# Patient Record
Sex: MALE | Race: WHITE | NOT HISPANIC OR LATINO | Employment: OTHER | URBAN - METROPOLITAN AREA
[De-identification: names, ages, dates, MRNs, and addresses within clinical notes are randomized per-mention and may not be internally consistent; named-entity substitution may affect disease eponyms.]

---

## 2018-08-06 ENCOUNTER — COMMUNICATION - HEALTHEAST (OUTPATIENT)
Dept: SCHEDULING | Facility: CLINIC | Age: 83
End: 2018-08-06

## 2018-08-14 ENCOUNTER — COMMUNICATION - HEALTHEAST (OUTPATIENT)
Dept: FAMILY MEDICINE | Facility: CLINIC | Age: 83
End: 2018-08-14

## 2018-08-17 ENCOUNTER — OFFICE VISIT - HEALTHEAST (OUTPATIENT)
Dept: FAMILY MEDICINE | Facility: CLINIC | Age: 83
End: 2018-08-17

## 2018-08-17 DIAGNOSIS — J18.9 PNEUMONIA OF LEFT LUNG DUE TO INFECTIOUS ORGANISM, UNSPECIFIED PART OF LUNG: ICD-10-CM

## 2018-08-17 ASSESSMENT — MIFFLIN-ST. JEOR: SCORE: 1424.69

## 2018-08-26 ENCOUNTER — COMMUNICATION - HEALTHEAST (OUTPATIENT)
Dept: RESPIRATORY THERAPY | Facility: HOSPITAL | Age: 83
End: 2018-08-26

## 2018-08-27 ENCOUNTER — COMMUNICATION - HEALTHEAST (OUTPATIENT)
Dept: CARE COORDINATION | Facility: CLINIC | Age: 83
End: 2018-08-27

## 2018-08-28 ENCOUNTER — COMMUNICATION - HEALTHEAST (OUTPATIENT)
Dept: RESPIRATORY THERAPY | Facility: HOSPITAL | Age: 83
End: 2018-08-28

## 2018-08-31 ENCOUNTER — OFFICE VISIT - HEALTHEAST (OUTPATIENT)
Dept: FAMILY MEDICINE | Facility: CLINIC | Age: 83
End: 2018-08-31

## 2018-08-31 ENCOUNTER — COMMUNICATION - HEALTHEAST (OUTPATIENT)
Dept: RESPIRATORY THERAPY | Facility: HOSPITAL | Age: 83
End: 2018-08-31

## 2018-08-31 ENCOUNTER — AMBULATORY - HEALTHEAST (OUTPATIENT)
Dept: PULMONOLOGY | Facility: OTHER | Age: 83
End: 2018-08-31

## 2018-08-31 DIAGNOSIS — R05.9 COUGH: ICD-10-CM

## 2018-08-31 ASSESSMENT — MIFFLIN-ST. JEOR: SCORE: 1419.02

## 2018-09-06 ENCOUNTER — COMMUNICATION - HEALTHEAST (OUTPATIENT)
Dept: RESPIRATORY THERAPY | Facility: CLINIC | Age: 83
End: 2018-09-06

## 2018-09-24 ENCOUNTER — COMMUNICATION - HEALTHEAST (OUTPATIENT)
Dept: SCHEDULING | Facility: CLINIC | Age: 83
End: 2018-09-24

## 2018-09-24 ENCOUNTER — COMMUNICATION - HEALTHEAST (OUTPATIENT)
Dept: FAMILY MEDICINE | Facility: CLINIC | Age: 83
End: 2018-09-24

## 2018-09-25 ENCOUNTER — COMMUNICATION - HEALTHEAST (OUTPATIENT)
Dept: SCHEDULING | Facility: CLINIC | Age: 83
End: 2018-09-25

## 2018-09-25 ENCOUNTER — COMMUNICATION - HEALTHEAST (OUTPATIENT)
Dept: FAMILY MEDICINE | Facility: CLINIC | Age: 83
End: 2018-09-25

## 2018-09-25 ENCOUNTER — COMMUNICATION - HEALTHEAST (OUTPATIENT)
Dept: RESPIRATORY THERAPY | Facility: HOSPITAL | Age: 83
End: 2018-09-25

## 2018-09-25 DIAGNOSIS — J44.9 COPD (CHRONIC OBSTRUCTIVE PULMONARY DISEASE) (H): ICD-10-CM

## 2018-10-17 ASSESSMENT — MIFFLIN-ST. JEOR: SCORE: 1399.29

## 2018-10-19 ASSESSMENT — MIFFLIN-ST. JEOR: SCORE: 1397.48

## 2018-10-20 ASSESSMENT — MIFFLIN-ST. JEOR: SCORE: 1389.31

## 2018-10-21 ENCOUNTER — SURGERY - HEALTHEAST (OUTPATIENT)
Dept: GASTROENTEROLOGY | Facility: HOSPITAL | Age: 83
End: 2018-10-21

## 2018-10-21 ASSESSMENT — MIFFLIN-ST. JEOR: SCORE: 1359.37

## 2018-10-22 ASSESSMENT — MIFFLIN-ST. JEOR: SCORE: 1367.99

## 2018-10-23 ENCOUNTER — HOME CARE/HOSPICE - HEALTHEAST (OUTPATIENT)
Dept: HOME HEALTH SERVICES | Facility: HOME HEALTH | Age: 83
End: 2018-10-23

## 2018-10-24 ENCOUNTER — COMMUNICATION - HEALTHEAST (OUTPATIENT)
Dept: CARE COORDINATION | Facility: CLINIC | Age: 83
End: 2018-10-24

## 2018-10-25 ENCOUNTER — COMMUNICATION - HEALTHEAST (OUTPATIENT)
Dept: FAMILY MEDICINE | Facility: CLINIC | Age: 83
End: 2018-10-25

## 2018-10-25 ENCOUNTER — HOME CARE/HOSPICE - HEALTHEAST (OUTPATIENT)
Dept: HOME HEALTH SERVICES | Facility: HOME HEALTH | Age: 83
End: 2018-10-25

## 2018-10-26 ENCOUNTER — COMMUNICATION - HEALTHEAST (OUTPATIENT)
Dept: FAMILY MEDICINE | Facility: CLINIC | Age: 83
End: 2018-10-26

## 2018-10-26 ENCOUNTER — OFFICE VISIT - HEALTHEAST (OUTPATIENT)
Dept: FAMILY MEDICINE | Facility: CLINIC | Age: 83
End: 2018-10-26

## 2018-10-26 ENCOUNTER — OFFICE VISIT - HEALTHEAST (OUTPATIENT)
Dept: PHARMACY | Facility: CLINIC | Age: 83
End: 2018-10-26

## 2018-10-26 DIAGNOSIS — J44.9 CHRONIC OBSTRUCTIVE PULMONARY DISEASE, UNSPECIFIED COPD TYPE (H): ICD-10-CM

## 2018-10-26 DIAGNOSIS — I42.0 DILATED CARDIOMYOPATHY (H): ICD-10-CM

## 2018-10-26 DIAGNOSIS — J47.1 BRONCHIECTASIS WITH ACUTE EXACERBATION (H): ICD-10-CM

## 2018-10-26 DIAGNOSIS — K59.00 CONSTIPATION, UNSPECIFIED CONSTIPATION TYPE: ICD-10-CM

## 2018-10-26 DIAGNOSIS — J96.01 ACUTE HYPOXEMIC RESPIRATORY FAILURE (H): ICD-10-CM

## 2018-10-26 DIAGNOSIS — Z46.6 ENCOUNTER FOR URINARY CATHETER: ICD-10-CM

## 2018-10-26 DIAGNOSIS — E56.9 VITAMIN DEFICIENCY: ICD-10-CM

## 2018-10-26 LAB
ALBUMIN SERPL-MCNC: 3.7 G/DL (ref 3.5–5)
ANION GAP SERPL CALCULATED.3IONS-SCNC: 12 MMOL/L (ref 5–18)
BASOPHILS # BLD AUTO: 0.1 THOU/UL (ref 0–0.2)
BASOPHILS NFR BLD AUTO: 0 % (ref 0–2)
BUN SERPL-MCNC: 40 MG/DL (ref 8–28)
CALCIUM SERPL-MCNC: 9.3 MG/DL (ref 8.5–10.5)
CHLORIDE BLD-SCNC: 99 MMOL/L (ref 98–107)
CO2 SERPL-SCNC: 23 MMOL/L (ref 22–31)
CREAT SERPL-MCNC: 1.23 MG/DL (ref 0.7–1.3)
EOSINOPHIL # BLD AUTO: 0.6 THOU/UL (ref 0–0.4)
EOSINOPHIL NFR BLD AUTO: 3 % (ref 0–6)
ERYTHROCYTE [DISTWIDTH] IN BLOOD BY AUTOMATED COUNT: 13.4 % (ref 11–14.5)
GFR SERPL CREATININE-BSD FRML MDRD: 56 ML/MIN/1.73M2
GLUCOSE BLD-MCNC: 84 MG/DL (ref 70–125)
HCT VFR BLD AUTO: 48.9 % (ref 40–54)
HGB BLD-MCNC: 16.3 G/DL (ref 14–18)
LYMPHOCYTES # BLD AUTO: 2.8 THOU/UL (ref 0.8–4.4)
LYMPHOCYTES NFR BLD AUTO: 15 % (ref 20–40)
MAGNESIUM SERPL-MCNC: 2.7 MG/DL (ref 1.8–2.6)
MCH RBC QN AUTO: 32.2 PG (ref 27–34)
MCHC RBC AUTO-ENTMCNC: 33.3 G/DL (ref 32–36)
MCV RBC AUTO: 97 FL (ref 80–100)
MONOCYTES # BLD AUTO: 1.9 THOU/UL (ref 0–0.9)
MONOCYTES NFR BLD AUTO: 11 % (ref 2–10)
NEUTROPHILS # BLD AUTO: 12.1 THOU/UL (ref 2–7.7)
NEUTROPHILS NFR BLD AUTO: 70 % (ref 50–70)
PHOSPHATE SERPL-MCNC: 3.5 MG/DL (ref 2.5–4.5)
PLATELET # BLD AUTO: 272 THOU/UL (ref 140–440)
PMV BLD AUTO: 10.9 FL (ref 8.5–12.5)
POTASSIUM BLD-SCNC: 5.4 MMOL/L (ref 3.5–5)
RBC # BLD AUTO: 5.06 MILL/UL (ref 4.4–6.2)
SODIUM SERPL-SCNC: 134 MMOL/L (ref 136–145)
WBC: 17.8 THOU/UL (ref 4–11)

## 2018-10-29 ENCOUNTER — HOME CARE/HOSPICE - HEALTHEAST (OUTPATIENT)
Dept: HOME HEALTH SERVICES | Facility: HOME HEALTH | Age: 83
End: 2018-10-29

## 2018-10-31 ENCOUNTER — AMBULATORY - HEALTHEAST (OUTPATIENT)
Dept: FAMILY MEDICINE | Facility: CLINIC | Age: 83
End: 2018-10-31

## 2018-10-31 DIAGNOSIS — R33.9 URINARY RETENTION: ICD-10-CM

## 2018-10-31 DIAGNOSIS — I42.0 DILATED CARDIOMYOPATHY (H): ICD-10-CM

## 2018-10-31 RX ORDER — TAMSULOSIN HYDROCHLORIDE 0.4 MG/1
0.4 CAPSULE ORAL DAILY
Qty: 30 CAPSULE | Refills: 2 | Status: SHIPPED | OUTPATIENT
Start: 2018-10-31 | End: 2022-01-01

## 2018-11-02 ENCOUNTER — HOME CARE/HOSPICE - HEALTHEAST (OUTPATIENT)
Dept: HOME HEALTH SERVICES | Facility: HOME HEALTH | Age: 83
End: 2018-11-02

## 2018-11-03 ENCOUNTER — HOME CARE/HOSPICE - HEALTHEAST (OUTPATIENT)
Dept: HOME HEALTH SERVICES | Facility: HOME HEALTH | Age: 83
End: 2018-11-03

## 2018-11-05 ENCOUNTER — HOME CARE/HOSPICE - HEALTHEAST (OUTPATIENT)
Dept: HOME HEALTH SERVICES | Facility: HOME HEALTH | Age: 83
End: 2018-11-05

## 2018-11-05 ENCOUNTER — COMMUNICATION - HEALTHEAST (OUTPATIENT)
Dept: PULMONOLOGY | Facility: OTHER | Age: 83
End: 2018-11-05

## 2018-11-05 ENCOUNTER — RECORDS - HEALTHEAST (OUTPATIENT)
Dept: PULMONOLOGY | Facility: OTHER | Age: 83
End: 2018-11-05

## 2018-11-05 ENCOUNTER — RECORDS - HEALTHEAST (OUTPATIENT)
Dept: ADMINISTRATIVE | Facility: OTHER | Age: 83
End: 2018-11-05

## 2018-11-05 ENCOUNTER — OFFICE VISIT - HEALTHEAST (OUTPATIENT)
Dept: PULMONOLOGY | Facility: OTHER | Age: 83
End: 2018-11-05

## 2018-11-05 DIAGNOSIS — J47.9 BRONCHIECTASIS WITHOUT COMPLICATION (H): ICD-10-CM

## 2018-11-05 DIAGNOSIS — R05.9 COUGH: ICD-10-CM

## 2018-11-05 DIAGNOSIS — R05.3 CHRONIC COUGH: ICD-10-CM

## 2018-11-05 ASSESSMENT — MIFFLIN-ST. JEOR: SCORE: 1349.85

## 2018-11-06 ENCOUNTER — AMBULATORY - HEALTHEAST (OUTPATIENT)
Dept: PULMONOLOGY | Facility: OTHER | Age: 83
End: 2018-11-06

## 2018-11-06 DIAGNOSIS — J47.9 BRONCHIECTASIS WITHOUT ACUTE EXACERBATION (H): ICD-10-CM

## 2018-11-07 ENCOUNTER — AMBULATORY - HEALTHEAST (OUTPATIENT)
Dept: CARDIOLOGY | Facility: CLINIC | Age: 83
End: 2018-11-07

## 2018-11-07 ENCOUNTER — OFFICE VISIT - HEALTHEAST (OUTPATIENT)
Dept: CARDIOLOGY | Facility: CLINIC | Age: 83
End: 2018-11-07

## 2018-11-07 DIAGNOSIS — I50.20 HEART FAILURE WITH REDUCED EJECTION FRACTION, NYHA CLASS III (H): ICD-10-CM

## 2018-11-07 DIAGNOSIS — G47.33 OSA (OBSTRUCTIVE SLEEP APNEA): ICD-10-CM

## 2018-11-07 DIAGNOSIS — I95.2 HYPOTENSION DUE TO DRUGS: ICD-10-CM

## 2018-11-07 DIAGNOSIS — I44.7 LEFT BUNDLE BRANCH BLOCK (LBBB): ICD-10-CM

## 2018-11-07 DIAGNOSIS — I42.0 DILATED CARDIOMYOPATHY (H): ICD-10-CM

## 2018-11-07 LAB
ANION GAP SERPL CALCULATED.3IONS-SCNC: 12 MMOL/L (ref 5–18)
BNP SERPL-MCNC: 145 PG/ML (ref 0–93)
BUN SERPL-MCNC: 28 MG/DL (ref 8–28)
CALCIUM SERPL-MCNC: 9.4 MG/DL (ref 8.5–10.5)
CHLORIDE BLD-SCNC: 100 MMOL/L (ref 98–107)
CO2 SERPL-SCNC: 25 MMOL/L (ref 22–31)
CREAT SERPL-MCNC: 1.14 MG/DL (ref 0.7–1.3)
DIGOXIN LEVEL LHE- HISTORICAL: <0.3 NG/ML (ref 0.5–2)
GFR SERPL CREATININE-BSD FRML MDRD: >60 ML/MIN/1.73M2
GLUCOSE BLD-MCNC: 100 MG/DL (ref 70–125)
MAGNESIUM SERPL-MCNC: 2.1 MG/DL (ref 1.8–2.6)
POTASSIUM BLD-SCNC: 5.2 MMOL/L (ref 3.5–5)
SODIUM SERPL-SCNC: 137 MMOL/L (ref 136–145)

## 2018-11-07 RX ORDER — METOPROLOL SUCCINATE 25 MG/1
25 TABLET, EXTENDED RELEASE ORAL DAILY
Qty: 45 TABLET | Refills: 2 | Status: SHIPPED
Start: 2018-11-07 | End: 2022-02-19

## 2018-11-07 ASSESSMENT — MIFFLIN-ST. JEOR: SCORE: 1372.53

## 2018-11-09 ENCOUNTER — RECORDS - HEALTHEAST (OUTPATIENT)
Dept: ADMINISTRATIVE | Facility: OTHER | Age: 83
End: 2018-11-09

## 2018-11-09 ENCOUNTER — HOME CARE/HOSPICE - HEALTHEAST (OUTPATIENT)
Dept: HOME HEALTH SERVICES | Facility: HOME HEALTH | Age: 83
End: 2018-11-09

## 2018-11-10 ENCOUNTER — HOME CARE/HOSPICE - HEALTHEAST (OUTPATIENT)
Dept: HOME HEALTH SERVICES | Facility: HOME HEALTH | Age: 83
End: 2018-11-10

## 2018-11-12 ENCOUNTER — HOME CARE/HOSPICE - HEALTHEAST (OUTPATIENT)
Dept: HOME HEALTH SERVICES | Facility: HOME HEALTH | Age: 83
End: 2018-11-12

## 2018-11-14 ENCOUNTER — RECORDS - HEALTHEAST (OUTPATIENT)
Dept: ADMINISTRATIVE | Facility: OTHER | Age: 83
End: 2018-11-14

## 2018-11-14 ENCOUNTER — HOME CARE/HOSPICE - HEALTHEAST (OUTPATIENT)
Dept: HOME HEALTH SERVICES | Facility: HOME HEALTH | Age: 83
End: 2018-11-14

## 2018-11-15 ENCOUNTER — HOME CARE/HOSPICE - HEALTHEAST (OUTPATIENT)
Dept: HOME HEALTH SERVICES | Facility: HOME HEALTH | Age: 83
End: 2018-11-15

## 2018-11-16 ENCOUNTER — HOME CARE/HOSPICE - HEALTHEAST (OUTPATIENT)
Dept: HOME HEALTH SERVICES | Facility: HOME HEALTH | Age: 83
End: 2018-11-16

## 2018-11-19 ENCOUNTER — COMMUNICATION - HEALTHEAST (OUTPATIENT)
Dept: HOME HEALTH SERVICES | Facility: HOME HEALTH | Age: 83
End: 2018-11-19

## 2018-11-19 ENCOUNTER — HOME CARE/HOSPICE - HEALTHEAST (OUTPATIENT)
Dept: HOME HEALTH SERVICES | Facility: HOME HEALTH | Age: 83
End: 2018-11-19

## 2018-11-20 ENCOUNTER — COMMUNICATION - HEALTHEAST (OUTPATIENT)
Dept: FAMILY MEDICINE | Facility: CLINIC | Age: 83
End: 2018-11-20

## 2018-11-21 ENCOUNTER — OFFICE VISIT - HEALTHEAST (OUTPATIENT)
Dept: CARDIOLOGY | Facility: CLINIC | Age: 83
End: 2018-11-21

## 2018-11-21 ENCOUNTER — AMBULATORY - HEALTHEAST (OUTPATIENT)
Dept: CARDIOLOGY | Facility: CLINIC | Age: 83
End: 2018-11-21

## 2018-11-21 DIAGNOSIS — I42.0 DILATED CARDIOMYOPATHY (H): ICD-10-CM

## 2018-11-21 DIAGNOSIS — I95.9 HYPOTENSION, UNSPECIFIED HYPOTENSION TYPE: ICD-10-CM

## 2018-11-21 DIAGNOSIS — I50.20 HEART FAILURE WITH REDUCED EJECTION FRACTION, NYHA CLASS III (H): ICD-10-CM

## 2018-11-21 DIAGNOSIS — E87.5 HYPERKALEMIA: ICD-10-CM

## 2018-11-21 ASSESSMENT — MIFFLIN-ST. JEOR: SCORE: 1330.57

## 2018-11-23 ENCOUNTER — COMMUNICATION - HEALTHEAST (OUTPATIENT)
Dept: RESPIRATORY THERAPY | Facility: HOSPITAL | Age: 83
End: 2018-11-23

## 2018-11-23 ENCOUNTER — HOME CARE/HOSPICE - HEALTHEAST (OUTPATIENT)
Dept: HOME HEALTH SERVICES | Facility: HOME HEALTH | Age: 83
End: 2018-11-23

## 2018-11-26 ENCOUNTER — HOME CARE/HOSPICE - HEALTHEAST (OUTPATIENT)
Dept: HOME HEALTH SERVICES | Facility: HOME HEALTH | Age: 83
End: 2018-11-26

## 2018-11-27 ENCOUNTER — HOME CARE/HOSPICE - HEALTHEAST (OUTPATIENT)
Dept: HOME HEALTH SERVICES | Facility: HOME HEALTH | Age: 83
End: 2018-11-27

## 2018-11-28 ENCOUNTER — HOME CARE/HOSPICE - HEALTHEAST (OUTPATIENT)
Dept: HOME HEALTH SERVICES | Facility: HOME HEALTH | Age: 83
End: 2018-11-28

## 2018-11-29 ENCOUNTER — HOME CARE/HOSPICE - HEALTHEAST (OUTPATIENT)
Dept: HOME HEALTH SERVICES | Facility: HOME HEALTH | Age: 83
End: 2018-11-29

## 2018-11-30 ENCOUNTER — OFFICE VISIT - HEALTHEAST (OUTPATIENT)
Dept: FAMILY MEDICINE | Facility: CLINIC | Age: 83
End: 2018-11-30

## 2018-11-30 DIAGNOSIS — R21 RASH: ICD-10-CM

## 2018-11-30 ASSESSMENT — MIFFLIN-ST. JEOR: SCORE: 1327.16

## 2018-12-03 ENCOUNTER — HOME CARE/HOSPICE - HEALTHEAST (OUTPATIENT)
Dept: HOME HEALTH SERVICES | Facility: HOME HEALTH | Age: 83
End: 2018-12-03

## 2018-12-05 ENCOUNTER — HOME CARE/HOSPICE - HEALTHEAST (OUTPATIENT)
Dept: HOME HEALTH SERVICES | Facility: HOME HEALTH | Age: 83
End: 2018-12-05

## 2018-12-06 ENCOUNTER — COMMUNICATION - HEALTHEAST (OUTPATIENT)
Dept: HOME HEALTH SERVICES | Facility: HOME HEALTH | Age: 83
End: 2018-12-06

## 2018-12-13 ENCOUNTER — HOME CARE/HOSPICE - HEALTHEAST (OUTPATIENT)
Dept: HOME HEALTH SERVICES | Facility: HOME HEALTH | Age: 83
End: 2018-12-13

## 2018-12-24 ENCOUNTER — COMMUNICATION - HEALTHEAST (OUTPATIENT)
Dept: RESPIRATORY THERAPY | Facility: HOSPITAL | Age: 83
End: 2018-12-24

## 2019-01-10 ENCOUNTER — OFFICE VISIT - HEALTHEAST (OUTPATIENT)
Dept: PULMONOLOGY | Facility: OTHER | Age: 84
End: 2019-01-10

## 2019-01-10 DIAGNOSIS — K22.70 BARRETT'S ESOPHAGUS WITHOUT DYSPLASIA: ICD-10-CM

## 2019-01-10 DIAGNOSIS — Z91.89 AT RISK FOR ASPIRATION: ICD-10-CM

## 2019-01-10 DIAGNOSIS — K22.4 ESOPHAGEAL DYSMOTILITY: ICD-10-CM

## 2019-01-10 DIAGNOSIS — K44.9 HIATAL HERNIA: ICD-10-CM

## 2019-01-15 ENCOUNTER — OFFICE VISIT - HEALTHEAST (OUTPATIENT)
Dept: CARDIOLOGY | Facility: CLINIC | Age: 84
End: 2019-01-15

## 2019-01-15 DIAGNOSIS — I42.0 DILATED CARDIOMYOPATHY (H): ICD-10-CM

## 2019-01-15 DIAGNOSIS — I44.7 LEFT BUNDLE BRANCH BLOCK (LBBB): ICD-10-CM

## 2019-01-15 DIAGNOSIS — Z86.79 HISTORY OF HYPOTENSION: ICD-10-CM

## 2019-01-15 DIAGNOSIS — I50.20 HEART FAILURE WITH REDUCED EJECTION FRACTION, NYHA CLASS III (H): ICD-10-CM

## 2019-01-15 ASSESSMENT — MIFFLIN-ST. JEOR: SCORE: 1326.03

## 2019-01-16 ENCOUNTER — COMMUNICATION - HEALTHEAST (OUTPATIENT)
Dept: CARDIOLOGY | Facility: CLINIC | Age: 84
End: 2019-01-16

## 2019-01-16 DIAGNOSIS — I42.0 DILATED CARDIOMYOPATHY (H): ICD-10-CM

## 2019-01-16 RX ORDER — LISINOPRIL 5 MG/1
5 TABLET ORAL AT BEDTIME
Qty: 90 TABLET | Refills: 3 | Status: SHIPPED | OUTPATIENT
Start: 2019-01-16

## 2019-01-23 ENCOUNTER — COMMUNICATION - HEALTHEAST (OUTPATIENT)
Dept: RESPIRATORY THERAPY | Facility: HOSPITAL | Age: 84
End: 2019-01-23

## 2019-01-28 ENCOUNTER — COMMUNICATION - HEALTHEAST (OUTPATIENT)
Dept: CARDIOLOGY | Facility: CLINIC | Age: 84
End: 2019-01-28

## 2019-01-29 ENCOUNTER — COMMUNICATION - HEALTHEAST (OUTPATIENT)
Dept: PULMONOLOGY | Facility: OTHER | Age: 84
End: 2019-01-29

## 2019-01-29 ENCOUNTER — AMBULATORY - HEALTHEAST (OUTPATIENT)
Dept: PULMONOLOGY | Facility: OTHER | Age: 84
End: 2019-01-29

## 2019-01-29 DIAGNOSIS — J47.9 BRONCHIECTASIS WITHOUT ACUTE EXACERBATION (H): ICD-10-CM

## 2019-02-04 ENCOUNTER — AMBULATORY - HEALTHEAST (OUTPATIENT)
Dept: CARE COORDINATION | Facility: CLINIC | Age: 84
End: 2019-02-04

## 2019-02-04 DIAGNOSIS — I50.20 HEART FAILURE WITH REDUCED EJECTION FRACTION, NYHA CLASS III (H): ICD-10-CM

## 2019-02-04 DIAGNOSIS — I42.0 DILATED CARDIOMYOPATHY (H): ICD-10-CM

## 2019-02-04 DIAGNOSIS — Z91.89 AT RISK FOR ASPIRATION: ICD-10-CM

## 2019-02-05 ENCOUNTER — COMMUNICATION - HEALTHEAST (OUTPATIENT)
Dept: CARE COORDINATION | Facility: CLINIC | Age: 84
End: 2019-02-05

## 2019-02-06 ENCOUNTER — COMMUNICATION - HEALTHEAST (OUTPATIENT)
Dept: NURSING | Facility: CLINIC | Age: 84
End: 2019-02-06

## 2019-02-07 ENCOUNTER — COMMUNICATION - HEALTHEAST (OUTPATIENT)
Dept: FAMILY MEDICINE | Facility: CLINIC | Age: 84
End: 2019-02-07

## 2019-02-19 ENCOUNTER — OFFICE VISIT - HEALTHEAST (OUTPATIENT)
Dept: CARDIOLOGY | Facility: CLINIC | Age: 84
End: 2019-02-19

## 2019-02-19 DIAGNOSIS — R06.02 SOB (SHORTNESS OF BREATH): ICD-10-CM

## 2019-02-19 DIAGNOSIS — I50.20 HEART FAILURE WITH REDUCED EJECTION FRACTION, NYHA CLASS III (H): ICD-10-CM

## 2019-02-19 DIAGNOSIS — I44.7 LEFT BUNDLE BRANCH BLOCK (LBBB): ICD-10-CM

## 2019-02-19 DIAGNOSIS — I42.0 DILATED CARDIOMYOPATHY (H): ICD-10-CM

## 2019-02-19 RX ORDER — FUROSEMIDE 40 MG
20 TABLET ORAL EVERY OTHER DAY
Qty: 30 TABLET | Refills: 2 | Status: SHIPPED
Start: 2019-02-19 | End: 2022-02-19

## 2019-02-19 ASSESSMENT — MIFFLIN-ST. JEOR: SCORE: 1270.47

## 2019-02-22 ENCOUNTER — AMBULATORY - HEALTHEAST (OUTPATIENT)
Dept: FAMILY MEDICINE | Facility: CLINIC | Age: 84
End: 2019-02-22

## 2019-02-22 ENCOUNTER — COMMUNICATION - HEALTHEAST (OUTPATIENT)
Dept: RESPIRATORY THERAPY | Facility: HOSPITAL | Age: 84
End: 2019-02-22

## 2019-02-22 ENCOUNTER — OFFICE VISIT - HEALTHEAST (OUTPATIENT)
Dept: FAMILY MEDICINE | Facility: CLINIC | Age: 84
End: 2019-02-22

## 2019-02-22 DIAGNOSIS — J47.9 BRONCHIECTASIS WITHOUT COMPLICATION (H): ICD-10-CM

## 2019-02-22 DIAGNOSIS — I50.20 HEART FAILURE WITH REDUCED EJECTION FRACTION, NYHA CLASS III (H): ICD-10-CM

## 2019-02-22 DIAGNOSIS — I42.0 DILATED CARDIOMYOPATHY (H): ICD-10-CM

## 2019-02-22 DIAGNOSIS — D64.9 ANEMIA, UNSPECIFIED TYPE: ICD-10-CM

## 2019-02-22 LAB
ANION GAP SERPL CALCULATED.3IONS-SCNC: 7 MMOL/L (ref 5–18)
BUN SERPL-MCNC: 15 MG/DL (ref 8–28)
CALCIUM SERPL-MCNC: 8.9 MG/DL (ref 8.5–10.5)
CHLORIDE BLD-SCNC: 104 MMOL/L (ref 98–107)
CO2 SERPL-SCNC: 26 MMOL/L (ref 22–31)
CREAT SERPL-MCNC: 0.86 MG/DL (ref 0.7–1.3)
ERYTHROCYTE [DISTWIDTH] IN BLOOD BY AUTOMATED COUNT: 11.1 % (ref 11–14.5)
GFR SERPL CREATININE-BSD FRML MDRD: >60 ML/MIN/1.73M2
GLUCOSE BLD-MCNC: 63 MG/DL (ref 70–125)
HCT VFR BLD AUTO: 36.4 % (ref 40–54)
HGB BLD-MCNC: 11.9 G/DL (ref 14–18)
MCH RBC QN AUTO: 31.6 PG (ref 27–34)
MCHC RBC AUTO-ENTMCNC: 32.6 G/DL (ref 32–36)
MCV RBC AUTO: 97 FL (ref 80–100)
PLATELET # BLD AUTO: 206 THOU/UL (ref 140–440)
PMV BLD AUTO: 7.7 FL (ref 7–10)
POTASSIUM BLD-SCNC: 5.3 MMOL/L (ref 3.5–5)
RBC # BLD AUTO: 3.76 MILL/UL (ref 4.4–6.2)
SODIUM SERPL-SCNC: 137 MMOL/L (ref 136–145)
WBC: 6.3 THOU/UL (ref 4–11)

## 2019-02-22 ASSESSMENT — MIFFLIN-ST. JEOR: SCORE: 1286.57

## 2019-02-25 ENCOUNTER — COMMUNICATION - HEALTHEAST (OUTPATIENT)
Dept: FAMILY MEDICINE | Facility: CLINIC | Age: 84
End: 2019-02-25

## 2019-02-25 DIAGNOSIS — R21 RASH: ICD-10-CM

## 2019-02-25 DIAGNOSIS — I42.0 DILATED CARDIOMYOPATHY (H): ICD-10-CM

## 2019-02-25 RX ORDER — TRIAMCINOLONE ACETONIDE 1 MG/G
CREAM TOPICAL
Qty: 80 G | Refills: 0 | Status: SHIPPED | OUTPATIENT
Start: 2019-02-25 | End: 2022-02-19

## 2019-02-25 RX ORDER — DIGOXIN 125 MCG
125 TABLET ORAL
Qty: 90 TABLET | Refills: 1 | Status: ON HOLD | OUTPATIENT
Start: 2019-02-25 | End: 2022-01-01

## 2019-03-19 ENCOUNTER — OFFICE VISIT - HEALTHEAST (OUTPATIENT)
Dept: CARDIOLOGY | Facility: CLINIC | Age: 84
End: 2019-03-19

## 2019-03-19 DIAGNOSIS — I44.7 LEFT BUNDLE BRANCH BLOCK (LBBB): ICD-10-CM

## 2019-03-19 DIAGNOSIS — E87.5 HYPERKALEMIA: ICD-10-CM

## 2019-03-19 DIAGNOSIS — R41.3 MEMORY IMPAIRMENT: ICD-10-CM

## 2019-03-19 DIAGNOSIS — I50.20 HEART FAILURE WITH REDUCED EJECTION FRACTION, NYHA CLASS III (H): ICD-10-CM

## 2019-03-19 DIAGNOSIS — I42.0 DILATED CARDIOMYOPATHY (H): ICD-10-CM

## 2019-03-19 DIAGNOSIS — R06.02 SOB (SHORTNESS OF BREATH): ICD-10-CM

## 2019-03-19 LAB
DIGOXIN LEVEL LHE- HISTORICAL: 0.4 NG/ML (ref 0.5–2)
POTASSIUM BLD-SCNC: 4.9 MMOL/L (ref 3.5–5)

## 2019-03-19 ASSESSMENT — MIFFLIN-ST. JEOR: SCORE: 1282.94

## 2019-03-20 LAB — BNP SERPL-MCNC: 146 PG/ML (ref 0–93)

## 2019-03-22 ENCOUNTER — COMMUNICATION - HEALTHEAST (OUTPATIENT)
Dept: RESPIRATORY THERAPY | Facility: HOSPITAL | Age: 84
End: 2019-03-22

## 2019-03-25 ENCOUNTER — AMBULATORY - HEALTHEAST (OUTPATIENT)
Dept: PULMONOLOGY | Facility: OTHER | Age: 84
End: 2019-03-25

## 2019-03-25 DIAGNOSIS — J96.01 ACUTE RESPIRATORY FAILURE WITH HYPOXIA (H): ICD-10-CM

## 2019-03-25 DIAGNOSIS — R06.2 WHEEZING: ICD-10-CM

## 2019-03-25 RX ORDER — ALBUTEROL SULFATE 0.83 MG/ML
2.5 SOLUTION RESPIRATORY (INHALATION) EVERY 4 HOURS PRN
Qty: 360 ML | Refills: 6 | Status: SHIPPED | OUTPATIENT
Start: 2019-03-25 | End: 2022-02-19

## 2019-03-25 RX ORDER — IPRATROPIUM BROMIDE AND ALBUTEROL SULFATE 2.5; .5 MG/3ML; MG/3ML
3 SOLUTION RESPIRATORY (INHALATION) 4 TIMES DAILY
Qty: 360 ML | Refills: 6 | Status: SHIPPED | OUTPATIENT
Start: 2019-03-25 | End: 2022-02-19

## 2019-03-27 ENCOUNTER — OFFICE VISIT - HEALTHEAST (OUTPATIENT)
Dept: CARDIOLOGY | Facility: CLINIC | Age: 84
End: 2019-03-27

## 2019-03-27 DIAGNOSIS — I42.0 DILATED CARDIOMYOPATHY (H): ICD-10-CM

## 2019-03-27 DIAGNOSIS — I50.20 HEART FAILURE WITH REDUCED EJECTION FRACTION, NYHA CLASS III (H): ICD-10-CM

## 2019-03-28 ENCOUNTER — RECORDS - HEALTHEAST (OUTPATIENT)
Dept: ADMINISTRATIVE | Facility: OTHER | Age: 84
End: 2019-03-28

## 2019-04-04 ENCOUNTER — COMMUNICATION - HEALTHEAST (OUTPATIENT)
Dept: CARDIOLOGY | Facility: CLINIC | Age: 84
End: 2019-04-04

## 2019-04-23 ENCOUNTER — COMMUNICATION - HEALTHEAST (OUTPATIENT)
Dept: RESPIRATORY THERAPY | Facility: HOSPITAL | Age: 84
End: 2019-04-23

## 2019-05-23 ENCOUNTER — COMMUNICATION - HEALTHEAST (OUTPATIENT)
Dept: RESPIRATORY THERAPY | Facility: HOSPITAL | Age: 84
End: 2019-05-23

## 2019-06-20 ENCOUNTER — COMMUNICATION - HEALTHEAST (OUTPATIENT)
Dept: RESPIRATORY THERAPY | Facility: HOSPITAL | Age: 84
End: 2019-06-20

## 2019-07-22 ENCOUNTER — COMMUNICATION - HEALTHEAST (OUTPATIENT)
Dept: RESPIRATORY THERAPY | Facility: HOSPITAL | Age: 84
End: 2019-07-22

## 2019-08-23 ENCOUNTER — COMMUNICATION - HEALTHEAST (OUTPATIENT)
Dept: RESPIRATORY THERAPY | Facility: HOSPITAL | Age: 84
End: 2019-08-23

## 2019-09-23 ENCOUNTER — COMMUNICATION - HEALTHEAST (OUTPATIENT)
Dept: RESPIRATORY THERAPY | Facility: HOSPITAL | Age: 84
End: 2019-09-23

## 2019-10-15 ENCOUNTER — COMMUNICATION - HEALTHEAST (OUTPATIENT)
Dept: CARDIOLOGY | Facility: CLINIC | Age: 84
End: 2019-10-15

## 2019-10-15 RX ORDER — ACETAMINOPHEN 325 MG/1
650 TABLET ORAL EVERY 4 HOURS PRN
Status: SHIPPED | COMMUNITY
Start: 2019-10-15 | End: 2022-02-19

## 2019-10-17 ENCOUNTER — AMBULATORY - HEALTHEAST (OUTPATIENT)
Dept: CARDIOLOGY | Facility: CLINIC | Age: 84
End: 2019-10-17

## 2019-10-17 DIAGNOSIS — I44.7 LEFT BUNDLE BRANCH BLOCK (LBBB): ICD-10-CM

## 2019-10-17 DIAGNOSIS — Z00.6 RESEARCH SUBJECT: ICD-10-CM

## 2019-10-17 LAB
ATRIAL RATE - MUSE: 72 BPM
DIASTOLIC BLOOD PRESSURE - MUSE: NORMAL
INTERPRETATION ECG - MUSE: NORMAL
P AXIS - MUSE: 77 DEGREES
PR INTERVAL - MUSE: 194 MS
QRS DURATION - MUSE: 162 MS
QT - MUSE: 448 MS
QTC - MUSE: 490 MS
R AXIS - MUSE: 53 DEGREES
SYSTOLIC BLOOD PRESSURE - MUSE: NORMAL
T AXIS - MUSE: 232 DEGREES
VENTRICULAR RATE- MUSE: 72 BPM

## 2019-10-17 ASSESSMENT — MIFFLIN-ST. JEOR: SCORE: 1245.52

## 2019-10-18 ENCOUNTER — COMMUNICATION - HEALTHEAST (OUTPATIENT)
Dept: CARDIOLOGY | Facility: CLINIC | Age: 84
End: 2019-10-18

## 2019-10-21 ENCOUNTER — AMBULATORY - HEALTHEAST (OUTPATIENT)
Dept: CARDIOLOGY | Facility: CLINIC | Age: 84
End: 2019-10-21

## 2019-10-23 ENCOUNTER — COMMUNICATION - HEALTHEAST (OUTPATIENT)
Dept: RESPIRATORY THERAPY | Facility: HOSPITAL | Age: 84
End: 2019-10-23

## 2019-11-22 ENCOUNTER — COMMUNICATION - HEALTHEAST (OUTPATIENT)
Dept: RESPIRATORY THERAPY | Facility: HOSPITAL | Age: 84
End: 2019-11-22

## 2019-12-24 ENCOUNTER — COMMUNICATION - HEALTHEAST (OUTPATIENT)
Dept: RESPIRATORY THERAPY | Facility: HOSPITAL | Age: 84
End: 2019-12-24

## 2021-05-26 NOTE — TELEPHONE ENCOUNTER
COPD educator note    Talked with Lilibeth(his wife) today. His wife is concerned about at night when he goes to bed he is coughing and spitting up phlegm. He states he is doing fine. He wife states he is depressed and in denial that he is not doing well. She states he has some new onset of Alzheimer's. He also has difficulty swallowing at times. We discussed going to the doctor about this issue of coughing. She will make an appointment with her primary doctor. She had no questions at this time. We will call again next month..    DARRIUS Gustafson

## 2021-05-27 NOTE — PATIENT INSTRUCTIONS - HE
Billie,    It was a pleasure to see you today at the [facility].     You will see Edgar on April 23 and Dr. Moran in 3 months.     Please call us if you have any questions or concerns about your heart.      Malik Moran M.D.

## 2021-05-27 NOTE — TELEPHONE ENCOUNTER
Spouse states pt got in to his pill box this morning and took 3 pills because he thought he was supposed to.  Spouse was able to figure out he took 1 digoxin, 1 furosemide, and 1 lisinopril.  Went over meds and routine - pt did not take any extra meds, just had them at incorrect times.      Spouse verbalized understanding and appreciated the help.  -la

## 2021-05-27 NOTE — TELEPHONE ENCOUNTER
----- Message from Scott Vizcarra sent at 4/4/2019  9:18 AM CDT -----  Contact: Kristie  General phone call:    Caller: Kristie Wife  Primary cardiologist: Edgar  Detailed reason for call: Patients wife would like to go over medication list, because she feels as though the patient is confused.   New or active symptoms? Active  Best phone number: [phone]  Best time to contact: anytime  Ok to leave a detailed message? yes  Device? no    Additional Info:

## 2021-05-28 NOTE — TELEPHONE ENCOUNTER
COPD educator note    Talked with Taras today. He states he is doing well and his breathing is fine. Pt had no questions at this time. We will call again next month.    DARRIUS Gustafson

## 2021-05-29 NOTE — TELEPHONE ENCOUNTER
Monthly follow up phone call for the COPD Program. Spoke with patient's wife today for ongoing COPD Education. Patient is diagnosed with Dementia so we will be talking to wife. Patient doing ok with his breathing. Just started Spiriva today. No other questions or concerns.

## 2021-05-31 NOTE — TELEPHONE ENCOUNTER
Monthly follow up phone call for the COPD Program. Spoke with Taras today for ongoing COPD Education. He said he feels really good. No questions or concerns today.

## 2021-06-01 VITALS — BODY MASS INDEX: 27.82 KG/M2 | HEIGHT: 67 IN | WEIGHT: 177.25 LBS

## 2021-06-01 VITALS — BODY MASS INDEX: 27.35 KG/M2 | WEIGHT: 174.25 LBS | HEIGHT: 67 IN

## 2021-06-01 NOTE — TELEPHONE ENCOUNTER
Monthly follow up phone call for the COPD Program. Spoke with Taras today for ongoing COPD Education. He said he feels good. No questions or concerns today.

## 2021-06-02 VITALS — WEIGHT: 163 LBS | BODY MASS INDEX: 25.58 KG/M2 | HEIGHT: 67 IN

## 2021-06-02 VITALS — WEIGHT: 146 LBS | HEIGHT: 67 IN | BODY MASS INDEX: 22.91 KG/M2

## 2021-06-02 VITALS — BODY MASS INDEX: 25.74 KG/M2 | HEIGHT: 67 IN | WEIGHT: 164 LBS

## 2021-06-02 VITALS — BODY MASS INDEX: 25.55 KG/M2 | HEIGHT: 66 IN | WEIGHT: 159 LBS

## 2021-06-02 VITALS — WEIGHT: 158.5 LBS | BODY MASS INDEX: 25.97 KG/M2

## 2021-06-02 VITALS — WEIGHT: 145 LBS | BODY MASS INDEX: 23.05 KG/M2

## 2021-06-02 VITALS
HEIGHT: 67 IN | HEIGHT: 66 IN | BODY MASS INDEX: 23.04 KG/M2 | WEIGHT: 146.8 LBS | WEIGHT: 145 LBS | BODY MASS INDEX: 23.3 KG/M2

## 2021-06-02 VITALS — WEIGHT: 159 LBS | HEIGHT: 67 IN | BODY MASS INDEX: 24.96 KG/M2

## 2021-06-02 VITALS — BODY MASS INDEX: 25.83 KG/M2 | WEIGHT: 157.6 LBS

## 2021-06-02 VITALS — BODY MASS INDEX: 25.71 KG/M2 | HEIGHT: 66 IN | WEIGHT: 160 LBS

## 2021-06-02 VITALS — HEIGHT: 66 IN | BODY MASS INDEX: 25.59 KG/M2 | WEIGHT: 159.25 LBS

## 2021-06-02 NOTE — TELEPHONE ENCOUNTER
Zestar Study Consent Visit    Study description: ECG and PPG Study: Zestar Study      Billie Garcias a 87 y.o. male , was contacted by today to discuss participation in the Zestar study.     The patient called the Clinical Trials Office to inquire about study participation.  The consent form was reviewed with the patient and wife.     The review of the study included:    Study purpose     Conflict of interest    Device description      Study visits    Risks of participation    Benefits (if any)    Alternatives    Voluntary participation    Confidentiality     Compensation/costs of participation    Study stipends    Injury and legal rights    The subject was queried in regards to his willingness to continue and come in for scheduled appointment. his questions were answered to his satisfaction.   The patient has given his preliminary agreement to volunteer to participate in the above noted study.     Plan: Billie Garcias will come to [facility] on 10/17/19  to continue consent process. If he continues to agrees to participate, the study visit will be done on the same day.    Gena West RN

## 2021-06-02 NOTE — TELEPHONE ENCOUNTER
Patient dropped out of study because he was not understanding how to use the devices anymore. Thought that it would just be best to be done as of 0830am 10/18/19. Will be returning devices via [employer] with same scheduled time 10/21/19, pick-up at 1200.     Annamarie Flaherty

## 2021-06-02 NOTE — TELEPHONE ENCOUNTER
Monthly follow up phone call for the COPD Program. Spoke with Taras today for ongoing COPD Education. He said he feels good. He has an appointment on Friday to see his physician. He was just started on Incruse. No questions or concerns today.

## 2021-06-03 VITALS
HEIGHT: 65 IN | RESPIRATION RATE: 16 BRPM | DIASTOLIC BLOOD PRESSURE: 77 MMHG | TEMPERATURE: 97.5 F | HEART RATE: 67 BPM | SYSTOLIC BLOOD PRESSURE: 123 MMHG | WEIGHT: 143 LBS | BODY MASS INDEX: 23.82 KG/M2

## 2021-06-03 NOTE — TELEPHONE ENCOUNTER
Monthly follow up phone call for the COPD Program. Spoke with Taras's wife Kristie today for ongoing COPD Education. She said he is not doing that well and coughing up yellow secretions. Advised Pat call physician or if he is in too much distress to call 911 and get him in. She said he is refusing to come in at this time.

## 2021-06-04 NOTE — TELEPHONE ENCOUNTER
COPD educator note    Talked with Taras's wife today. She states he is doing ok. Pt had no questions at this time. It has been a year from his last respiratory issue. No more calls.    CIRO GustafsonT

## 2021-06-16 PROBLEM — I42.0 DILATED CARDIOMYOPATHY (H): Chronic | Status: ACTIVE | Noted: 2018-10-18

## 2021-06-16 PROBLEM — I44.7 LEFT BUNDLE BRANCH BLOCK (LBBB): Chronic | Status: ACTIVE | Noted: 2018-08-23

## 2021-06-16 PROBLEM — D64.9 ANEMIA: Status: ACTIVE | Noted: 2018-10-17

## 2021-06-16 PROBLEM — J44.9 COPD (CHRONIC OBSTRUCTIVE PULMONARY DISEASE) (H): Chronic | Status: ACTIVE | Noted: 2018-11-05

## 2021-06-16 PROBLEM — K44.9 HIATAL HERNIA: Chronic | Status: ACTIVE | Noted: 2019-01-10

## 2021-06-17 NOTE — PATIENT INSTRUCTIONS - HE
Patient Instructions by Garrett Mclaughlin MD at 1/10/2019 11:30 AM     Author: Garrett Mclaughlin MD Service: -- Author Type: Physician    Filed: 1/10/2019 11:49 AM Encounter Date: 1/10/2019 Status: Signed    : Garrett Mclaughlin MD (Physician)       Patient Education     Understanding Bronchiectasis  Bronchiectasis is a condition in which the airways of the lungs (bronchi and bronchioles) become wider than normal. Over time, the walls of the airways become thick and scarred. The damaged airways cant clear mucus as well. Because of this, mucus builds up in the airways. This increases the risk for lung infections. Bronchiectasis is a long-term (chronic) condition.  What causes bronchiectasis?  Doctors do not know exactly what causes this condition. It occurs in people with lung infections who have long-term damage to the airways from other health problems.  Smokers and those with long-term lung disease are more likely to develop bronchiectasis. Some of the conditions that increase the chance for bronchiectasis include:    Cystic fibrosis    Lung infections such as pneumonia, tuberculosis, or whooping cough    Chronic obstructive pulmonary disease (COPD)    Problems with the bodys defense (immune) system    Allergic bronchopulmonary aspergillosis (ABPA)    Long-term problem with inhaling food or liquids into the lungs (aspiration)    Certain autoimmune diseases such as rheumatoid arthritis    Blocked airway, such as from a tumor or inhaled object    Lung problems that are present at birth (congenital)  What are the symptoms of bronchiectasis?  Damage to the airways often starts in childhood. You may not have symptoms until months or years after repeated lung infections. Some people have few or no symptoms. Others have daily symptoms that get worse over time. Common symptoms include:    Long-term cough    Coughing up a lot of thick mucus that may have blood in it    Trouble breathing    Inflammation of the nose and  sinuses (rhinosinusitis)    Inflammation of the covering of the lungs (pleurisy or pleuritis)    Feeling tired  How is bronchiectasis diagnosed?  Your healthcare provider will ask about your past health and symptoms. Youll also have a physical exam. This includes listening to your chest with a stethoscope. You may need tests to help with the diagnosis, including:    Blood tests. These check for infection, immune system problems, and overall health.    Sputum culture. This is a test of the mucus in your lungs. Its checked for a bacterial or fungal infection.    Chest X-ray. This is done to get information about your heart and lungs.    Chest CT scan. This gives detailed pictures of your airway. Its most often used to make the diagnosis.    Lung function and exercise tests. They include spirometry and a 6-minute walk test. These tests show how well your lungs work and if you are able to get enough oxygen into your body, even when exerting yourself.  Date Last Reviewed: 3/1/2017    7798-9545 [employer]. [address] [place] [address], [place] [address]. All rights reserved. This information is not intended as a substitute for professional medical care. Always follow your healthcare professional's instructions.

## 2021-06-18 NOTE — LETTER
Letter by Garrett Mclaughlin MD at      Author: Garrett Mclaughlin MD Service: -- Author Type: --    Filed:  Encounter Date: 1/10/2019 Status: (Other)       David Floyd MD  [address] [place] [address]                                  January 10, 2019    Patient: Billie Garcias   MR Number: [id number]   YOB: 1932   Date of Visit: 1/10/2019     Dear Dr. Everett MD:    Thank you for referring Billie Garcias to me for evaluation. Below are the relevant portions of my assessment and plan of care.    If you have questions, please do not hesitate to call me. I look forward to following Billie along with you.    Sincerely,        Garrett Mclaughlin MD          CC  No Recipients  Garrett Mclaughlin MD  1/10/2019 11:47 AM  Sign at close encounter  Pulmonary Clinic Follow-up Visit    86M, formed smoker,  Torres's esophagus with chronic aspiration, with a obstructive lung disease/COPD likey due to prior smoking history and chronic bronchiectasis, presents for follow up of the above. He appears to be doing well on current regimen without any recent exacerbations. He does need to remember to do the chin tuck maneuver to minimize aspiration.     Recommendations:  - Continue Advair diskus, rinse/gargle after each use. Consider dose reduction next visit.   - continue the nebs and use flutter valve to promote secretion clearance  - encouraged him to keep active and exercise as able  - reinforced chin tuck/aspiration precautions to minimize aspiration.  - UTD with flu and PCV13; will give PSV23 today.  - since he appears to be doing well with few symptoms, will hold off on pulmonary rehab referral for now.     Follow up in 6 months.        CCx: follow up of bronchiectasis    HPI: Interim history: I last saw Billie on 11/5/2018. Since that time, he's been doing well. No pulmonary infections or hospitalizations. He does cough daily, sometimes 6x/day clear phlegm; mostly if he forgets to do the chin tuck  maneuver when he's eating, he'll aspirate liquids. If he does the chin tuck he does not cough.  Using advair as directed. Using nebs with flutter valve as needed.  Able to climb stairs without breathing limitations.  No weight loss.      ROS:  A 12-system review was obtained and was negative with the exception of the symptoms endorsed in the history of present illness.    PMH:  Past Medical History:   Diagnosis Date   ? Bronchiectasis (H)    ? COPD (chronic obstructive pulmonary disease) (H)    ? DNAR (do not attempt resuscitation)    ? Hyperlipidemia    ? Left bundle branch block (LBBB) 2018   ? Lung nodule        PSH:  Past Surgical History:   Procedure Laterality Date   ? HEMORRHOID SURGERY     ? [place] ESOPHAGOGASTRODUODENOSCOPY TRANSORAL DIAGNOSTIC N/A 10/21/2018    Procedure: ESOPHAGOGASTRODUODENOSCOPY (EGD) with biopsy;  Surgeon: Masoud Machuca MD;  Location: [facility];  Service: Gastroenterology   ? TONSILLECTOMY         Allergies:  Allergies   Allergen Reactions   ? Spironolactone Rash     patient broke out in very bad rash on both arms.        Family HX:  Family History   Problem Relation Age of Onset   ? Cancer Mother    ? Other Father 35         in a car accident   ? No Medical Problems Brother    ? No Medical Problems Brother    ? No Medical Problems Sister    ? No Medical Problems Sister    ? No Medical Problems Sister    ? No Medical Problems Daughter    ? No Medical Problems Daughter        Social Hx:  Social History     Socioeconomic History   ? Marital status:      Spouse name: Louann   ? Number of children: 2   ? Years of education: Not on file   ? Highest education level: Not on file   Social Needs   ? Financial resource strain: Not on file   ? Food insecurity - worry: Not on file   ? Food insecurity - inability: Not on file   ? Transportation needs - medical: Not on file   ? Transportation needs - non-medical: Not on file   Occupational History   ? Occupation:  manufacturing work     Employer: RETIRED     Comment: [employer] in [place]   Tobacco Use   ? Smoking status: Former Smoker     Packs/day: 1.00     Years: 16.00     Pack years: 16.00     Types: Cigarettes     Last attempt to quit: 1957     Years since quittin.0   ? Smokeless tobacco: Never Used   Substance and Sexual Activity   ? Alcohol use: No   ? Drug use: No   ? Sexual activity: Not Currently     Partners: Female   Other Topics Concern   ? Not on file   Social History Narrative    He lives with his wife.       Current Meds:  Current Outpatient Medications   Medication Sig Dispense Refill   ? albuterol (PROVENTIL) 2.5 mg /3 mL (0.083 %) nebulizer solution Take 3 mL (2.5 mg total) by nebulization every 4 (four) hours as needed for wheezing. 40 vial 0   ? digoxin (LANOXIN) 125 mcg tablet Take 1 tablet (125 mcg total) by mouth daily with supper. 30 tablet 2   ? FA/MV,CA,IRON,MIN/LYCOPENE/LUT (MULTIVITAL ORAL) Take 1 tablet by mouth daily.     ? fluticasone-salmeterol (ADVAIR DISKUS) 250-50 mcg/dose DISKUS Inhale 1 puff 2 (two) times a day. 1 each 6   ? furosemide (LASIX) 40 MG tablet Take 0.5 tablets (20 mg total) by mouth daily. 30 tablet 2   ? ipratropium-albuterol (DUO-NEB) 0.5-2.5 mg/3 mL nebulizer Take 3 mL by nebulization 4 (four) times a day. 3 mL 0   ? lisinopril (PRINIVIL,ZESTRIL) 5 MG tablet Take 1 tablet (5 mg total) by mouth daily. (Patient taking differently: Take 5 mg by mouth at bedtime .      ) 30 tablet 2   ? metoprolol succinate (TOPROL-XL) 25 MG Take 1 tablet (25 mg total) by mouth daily. (Patient taking differently: Take 25 mg by mouth at bedtime .      ) 45 tablet 2   ? psyllium (METAMUCIL) 0.52 gram capsule Take 0.52 g by mouth 2 (two) times a day.     ? tamsulosin (FLOMAX) 0.4 mg cap Take 1 capsule (0.4 mg total) by mouth daily. 30 capsule 2   ? triamcinolone (KENALOG) 0.1 % cream Apply to affected skin 2 times daily. 80 g 2     No current facility-administered medications for this  visit.        Physical Exam:  /62   Pulse 60   Resp 20   Wt 158 lb 8 oz (71.9 kg)   SpO2 98% Comment: RA  BMI 25.97 kg/m     Gen: awake, alert, oriented, no distress  HEENT: nasal turbinates are unremarkable, no oropharyngeal lesions, no cervical or supraclavicular lymphadenopathy  CV: RRR, no M/G/R  Resp: mild rhonchi on the left base. Right is clear.   Abd: soft, nontender, no palpable organomegaly  Skin: no apparent rashes  Ext: no cyanosis, clubbing or edema  Neuro: alert, nonfocal    Labs:  Reviewed  Sputum 10/18     Imaging studies:  CTA chest Oct 2018  IMPRESSION:   CONCLUSION:  1.  Study is limited by motion. Within limitations, no central or lobar PE. Segmental and subsegmental vessels are not adequately assessed.  2.  Enlargement of the pulmonary arteries suggests pulmonary hypertension.  3.  Fluid throughout the esophagus raises the risk of aspiration and represents dysmotility. There is debris and bronchiectasis predominantly in the airways to the lower lungs. Chronic aspiration is suspected.  4.  Enlargement of the left heart. There is aortic tortuosity.  5.  Atherosclerotic disease including coronary artery calcification.    PFT's  FEV1/FVC is 0.59 and is reduced.  FEV1 is 1.39 L (56% predicted) and is reduced.  FVC is 2.36 L (70% predicted) and reduced.  There was improvement in spirometry after a single inhaled dose of bronchodilator.  TLC is 8.64 L (132% predicted) and is increased.  RV is 6.08 L (209% predicted) and is increased.  DLCO is 56% predicted and is reduced when it is corrected for hemoglobin.     Impression:  Full Pulmonary Function Test is abnormal  Spirometry is consistent with moderate-severe obstructive ventilatory defect.  Spirometry is consistent with reversibility.  There is hyperinflation.  There is air-trapping.  Diffusion capacity when corrected for hemoglobin is moderately reduced.    Garrett (Kike) MD Arnoldo  [facility]  Pager [phone])  [phone]  [facility] [phone]

## 2021-06-18 NOTE — LETTER
Letter by Jose Antonio Bryant MD at      Author: Jose Antonio Bryant MD Service: -- Author Type: --    Filed:  Encounter Date: 2/25/2019 Status: (Other)       Billie Garcias  [address] [place] [address]     February 25, 2019   Dear Mr. Garcias,  Below are the results from your recent visit:  Resulted Orders   Basic Metabolic Panel   Result Value Ref Range    Sodium 137 136 - 145 mmol/L    Potassium 5.3 (H) 3.5 - 5.0 mmol/L    Chloride 104 98 - 107 mmol/L    CO2 26 22 - 31 mmol/L    Anion Gap, Calculation 7 5 - 18 mmol/L    Glucose 63 (L) 70 - 125 mg/dL    Calcium 8.9 8.5 - 10.5 mg/dL    BUN 15 8 - 28 mg/dL    Creatinine 0.86 0.70 - 1.30 mg/dL    GFR MDRD Af Amer >60 >60 mL/min/1.73m2    GFR MDRD Non Af Amer >60 >60 mL/min/1.73m2    Narrative    Fasting Glucose reference range is 70-99 mg/dL per  American Diabetes Association (ADA) guidelines.   HM2(CBC w/o Differential)   Result Value Ref Range    WBC 6.3 4.0 - 11.0 thou/uL    RBC 3.76 (L) 4.40 - 6.20 mill/uL    Hemoglobin 11.9 (L) 14.0 - 18.0 g/dL    Hematocrit 36.4 (L) 40.0 - 54.0 %    MCV 97 80 - 100 fL    MCH 31.6 27.0 - 34.0 pg    MCHC 32.6 32.0 - 36.0 g/dL    RDW 11.1 11.0 - 14.5 %    Platelets 206 140 - 440 thou/uL    MPV 7.7 7.0 - 10.0 fL   His potassium level is slightly elevated.  There is no need to adjust medication, he should try to drink an extra glass of water per day.  The potassium needs to be rechecked in about 2 weeks.  He can schedule a lab visit to get this done. A lab appointment can be made by calling [phone] and asking for a lab-only at the [facility]. No need to be fasting for this, just remember to drink a lot of water before the lab appointment.  The red blood cell count is slightly low but has remained stable there is no concern with the blood count measurements.    Please call with questions or contact us using [employer].Sincerely, Jose Antonio Bryant MD

## 2021-06-20 NOTE — PROGRESS NOTES
Assessment: /    Plan:    1. Pneumonia of left lung due to infectious organism, unspecified part of lung  azithromycin (ZITHROMAX Z-PRAMOD) 250 MG tablet       Z-Pramod.  Call or recheck if any problem.      Subjective:    HPI:  Billie Garcias is an 86-year-old male presenting for follow-up on cough.  He was seen at [facility] ER August 6 and given an albuterol inhaler.  It has been helping.  He has been waking at 2 AM and coughing out 1/2 cup of yellow phlegm.  He uses Robitussin-DM during the daytime and at bedtime.  He has occasional sweating.      Review of Systems: No rash or chest pain.      Current Outpatient Prescriptions   Medication Sig Dispense Refill     albuterol (PROAIR HFA;PROVENTIL HFA;VENTOLIN HFA) 90 mcg/actuation inhaler Inhale 2 puffs every 4 (four) hours as needed for wheezing. 1 Inhaler 0     FA/MV,CA,IRON,MIN/LYCOPENE/LUT (MULTIVITAL ORAL) Take 1 tablet by mouth daily.       PSYLLIUM HUSK/CA CARBONATE (METAMUCIL PLUS CALCIUM ORAL) Take 1 capsule by mouth 2 (two) times a day.       azithromycin (ZITHROMAX Z-PRAMOD) 250 MG tablet Take 2 tablets (500 mg) on  Day 1,  followed by 1 tablet (250 mg) once daily on Days 2 through 5. 6 tablet 0     No current facility-administered medications for this visit.          Objective:    Vitals:    08/17/18 1115   BP: 138/66   Pulse: 84   Resp: 19   Temp: 98  F (36.7  C)   SpO2: 97%       Gen:  NAD, VSS  Lungs:   rhonchi on the left, good air entry  Heart:  normal          ADDITIONAL HISTORY SUMMARIZED (2):  reviewed ER note.  DECISION TO OBTAIN EXTRA INFORMATION (1): None.   RADIOLOGY TESTS (1): Reviewed ER chest x-ray.  LABS (1): Reviewed ER labs.  MEDICINE TESTS (1): None.  INDEPENDENT REVIEW (2 each): None.     Total Data Points: 4

## 2021-06-20 NOTE — PROGRESS NOTES
TCM DISCHARGE FOLLOW UP CALL    Discharge Date:  8/25/2018  Reason for hospital stay (discharge diagnosis)::  COPD exacerbation  Are you feeling better, the same or worse since your discharge?:  Patient is feeling better (States he can breathe through his nose now, and now coughing up some phlegm but not a lot. )  Do you feel like you have a plan in the event of a health emergency?: No    As part of your discharge plan, were  home care services ordered for you?: No    Did you receive any new medications, or was there a change to your medications?: Yes    Are you taking those medications, or do you have any established regiment?:  Yes - Prednisone taper, & Mucinex.  Taking as prescribed.  Do you have any follow up visits scheduled with your PCP or Specialist?:  Yes, with PCP (Dr. Floyd 8/31/18)  (RN) Is PCP appt scheduled soon enough (within 14 days of discharge date)?: Yes        Valentina Roger RN Care Manager, Population Health

## 2021-06-20 NOTE — PROGRESS NOTES
Assessment: /    Plan:    1. Cough  Ambulatory referral to Pulmonology       He was given high-dose influenza vaccine today.  He will have pulmonology appointment.  Recheck in the meantime if any problem.    I reconciled all of his medications.      Subjective:    HPI:  Billie Garcias is an 86-year-old male presenting for follow-up of hospitalization.  He was at [facility] August 23-25 due to COPD exacerbation.  He was given IV steroids, and then started on a 12-day oral prednisone taper.  He had previously been treated with azithromycin as an outpatient, with no improvement.  I reviewed the discharge summary by Dr. Manzo.  Chest x-ray demonstrated no infiltrate.  CT of the chest in 2016 demonstrated mild emphysema.  EKG during his hospitalization demonstrated left bundle branch block.  Hemogram and CMP normal.  His cough is productive of white sputum.  His breathing has improved significantly since the hospital stay.      Review of Systems: No fever, wheezing, rash, nausea, chest pain.      Current Outpatient Prescriptions   Medication Sig Dispense Refill     albuterol (PROAIR HFA;PROVENTIL HFA;VENTOLIN HFA) 90 mcg/actuation inhaler Inhale 2 puffs every 4 (four) hours as needed for wheezing. 1 Inhaler 0     FA/MV,CA,IRON,MIN/LYCOPENE/LUT (MULTIVITAL ORAL) Take 1 tablet by mouth daily.       predniSONE (DELTASONE) 10 mg tablet Taper once daily: 40 mg X 3 days, followed by 30 mg ×3 days, followed by 20 mg ×3 days, followed by 10 mg ×3 days and then stop 30 tablet 0     PSYLLIUM HUSK/CA CARBONATE (METAMUCIL PLUS CALCIUM ORAL) Take 1 capsule by mouth 2 (two) times a day.       No current facility-administered medications for this visit.          Objective:    Vitals:    08/31/18 1048   BP: 138/72   Pulse: 82   Temp: 97.4  F (36.3  C)   SpO2: 98%       Gen:  NAD, VSS  Lungs:  normal  Heart:  normal          ADDITIONAL HISTORY SUMMARIZED (2): reviewed discharge summary.  DECISION TO OBTAIN EXTRA INFORMATION (1):  None.   RADIOLOGY TESTS (1): Reviewed chest x-ray and CT of the chest.  LABS (1): Reviewed labs.  MEDICINE TESTS (1): Reviewed EKG.  INDEPENDENT REVIEW (2 each): None.     Total Data Points: 5

## 2021-06-21 NOTE — PROGRESS NOTES
TCM DISCHARGE FOLLOW UP CALL    Discharge Date:  10/23/2018  Reason for hospital stay (discharge diagnosis)::  Dialated CM, Acute Hypoxemic Respiratory Failure, urinary retention  Are you feeling better, the same or worse since your discharge?:  Patient is feeling better (wife states he is feeling better. she notes he is having dyspnea at night.)  Do you feel like you have a plan in the event of a health emergency?: Yes (call home care or clinic. Informed wife of 24 hr nuse triage.)    As part of your discharge plan, were  home care services ordered for you?: Yes    Have you seen them yet, or are they scheduled to visit?: Yes    Do you have any follow up visits scheduled with your PCP or Specialist?:  Yes, with PCP  (RN) Is PCP appt scheduled soon enough (within 14 days of discharge date)?: Yes    RN NOTES::  Wife is having a lot of anxiety about leg bag and catheter mgmt. Instructed wife to talk to the home care nurse again about cath care. Reviewed low sodium food options. Suggested she have home care nurse look at her pantry and teach low sodium choices.

## 2021-06-21 NOTE — PROGRESS NOTES
Pulmonary Clinic Follow-up Visit    86M, formed smoker, with a chart history of COPD (no PFTs until today, approx. 15 pack year tobacco history, presents for hospital discharge follow up for bronchiectasis exacerbation and dyspnea. He is doing much better since recent hospitalization but continues to cough. He does not have significant dyspnea. Oxygen saturation is borderline low today. 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 The bronchiectasis was mild on his last CT scan but he does have symptoms, airflow limitation and impaired diffusing capacity, so we will need to optimize his inhaler regimen to improve his symptom burden and keep him out of the hospital.    Recommendations:  - Start high-dose Symbicort with spacer, 2 puffs two times a day. Rinse/gargle after each use. Inhaler technique demonstrated in clinic today  - continue the nebs and use flutter valve to promote secretion clearance  - encouraged him to keep active and exercise as able  - follow up with cardiology on 11/7, may be good candidate for either cardiac or pulmonary rehab; would like him to see cardiology first to see if this would be safe  - I gave him the number for [facility] to have the vaz removed.  - UTD with flu and pneumonia vaccinations  - continue aspiration precautions  - consideration for further GI evaluation and/or trial of PPI if still coughing    All questions answered. Follow up in 2 months for reassessment.    CCx: hospital discharge follow up, cough    HPI: Interm history: I last saw Billie when he was in the hospital on 10/20/2018. He was admitted to [facility] from 10/17 - 10/23 with dyspnea and bronchiectasis exacerbation. He improved with augmentin, mucomyst, flutter valve and duonebs. He was discharged on duonebs only. He had an echo showing new dilated CM with EF 26% and he was discharged on CHF medications. He had aspiration  evaluation which was negative. He had an EGD which showed long-segment Torres's but no malignancy. He will see cardiology on .     Since his hospitalization, he reports his breathing is much better. His main issue is cough, worse at night, productive of clear phlegm. No hemoptysis. He is able to get around without breathing limitations. He denies any orthopnea, PND or edema.    He used to work at the [employer] factory; he was exposed to significant fumes/exhaust and did not use protective masks. He retired in . He has a minimal smoking history (approx 15 pack years).     ROS:  A 12-system review was obtained and was negative with the exception of the symptoms endorsed in the history of present illness.    PMH:  Past Medical History:   Diagnosis Date     Bronchiectasis (H)      COPD (chronic obstructive pulmonary disease) (H)      DNAR (do not attempt resuscitation)      Hyperlipidemia      Left bundle branch block (LBBB) 2018     Lung nodule        PSH:  Past Surgical History:   Procedure Laterality Date     HEMORRHOID SURGERY       [place] ESOPHAGOGASTRODUODENOSCOPY TRANSORAL DIAGNOSTIC N/A 10/21/2018    Procedure: ESOPHAGOGASTRODUODENOSCOPY (EGD) with biopsy;  Surgeon: Masoud Machuca MD;  Location: [facility];  Service: Gastroenterology     TONSILLECTOMY         Allergies:  No Known Allergies    Family HX:  Family History   Problem Relation Age of Onset     Cancer Mother      Other Father 35      in a car accident     No Medical Problems Brother      No Medical Problems Brother      No Medical Problems Sister      No Medical Problems Sister      No Medical Problems Sister      No Medical Problems Daughter      No Medical Problems Daughter        Social Hx:  Social History     Social History     Marital status:      Spouse name: Louann     Number of children: 2     Years of education: N/A     Occupational History     manufacturing work Retired     [employer] in [place]      Social History Main Topics     Smoking status: Former Smoker     Packs/day: 1.00     Years: 16.00     Types: Cigarettes     Quit date: 1957     Smokeless tobacco: Never Used     Alcohol use No     Drug use: No     Sexual activity: Not Currently     Partners: Female     Other Topics Concern     Not on file     Social History Narrative    He lives with his wife.       Current Meds:  Current Outpatient Prescriptions   Medication Sig Dispense Refill     albuterol (PROVENTIL) 2.5 mg /3 mL (0.083 %) nebulizer solution Take 3 mL (2.5 mg total) by nebulization every 4 (four) hours as needed for wheezing. 40 vial 0     digoxin (LANOXIN) 125 mcg tablet Take 1 tablet (125 mcg total) by mouth daily with supper. 30 tablet 2     FA/MV,CA,IRON,MIN/LYCOPENE/LUT (MULTIVITAL ORAL) Take 1 tablet by mouth daily.       furosemide (LASIX) 40 MG tablet Take 1 tablet (40 mg total) by mouth daily. 30 tablet 2     ipratropium-albuterol (DUO-NEB) 0.5-2.5 mg/3 mL nebulizer Take 3 mL by nebulization 4 (four) times a day. 3 mL 0     lisinopril (PRINIVIL,ZESTRIL) 5 MG tablet Take 1 tablet (5 mg total) by mouth daily. 30 tablet 2     metoprolol succinate (TOPROL-XL) 25 MG Take 1.5 tablets (37.5 mg total) by mouth daily. 45 tablet 2     multivitamin with minerals (THERA-M) 9 mg iron-400 mcg Tab tablet Take 1 tablet by mouth daily.       psyllium (METAMUCIL) 0.52 gram capsule Take 0.52 g by mouth 2 (two) times a day.       senna-docusate (PERICOLACE) 8.6-50 mg tablet Take 1 tablet by mouth 2 (two) times a day. Hold for diarrhea.  0     spironolactone (ALDACTONE) 25 MG tablet Take 0.5 tablets (12.5 mg total) by mouth daily. 15 tablet 2     tamsulosin (FLOMAX) 0.4 mg cap Take 1 capsule (0.4 mg total) by mouth daily. 30 capsule 2     budesonide-formoterol (SYMBICORT) 160-4.5 mcg/actuation inhaler Inhale 2 puffs 2 (two) times a day. 1 Inhaler 12     No current facility-administered medications for this visit.        Physical Exam:  /80  Pulse  "74  Ht 5' 7\" (1.702 m)  Wt 159 lb (72.1 kg)  SpO2 90%  BMI 24.9 kg/m2  Gen: awake, alert, oriented, no distress  HEENT: nasal turbinates are unremarkable, no oropharyngeal lesions, no cervical or supraclavicular lymphadenopathy  CV: RRR, no M/G/R  Resp: CTAB, no focal crackles or wheezes  Abd: soft, nontender, no palpable organomegaly  Skin: no apparent rashes  Ext: no cyanosis  or edema +clubbing in digits bilaterally  Neuro: alert, nonfocal    Labs:  Reviewed   on 10/17  PCT negative 10/17  Sputum UF  CBC OK, no eos  Wbc 17.8 on 10/23 (steroids)    Imaging studies:  CTA chest 10/17  IMPRESSION:   CONCLUSION:  1.  Study is limited by motion. Within limitations, no central or lobar PE. Segmental and subsegmental vessels are not adequately assessed.  2.  Enlargement of the pulmonary arteries suggests pulmonary hypertension.  3.  Fluid throughout the esophagus raises the risk of aspiration and represents dysmotility. There is debris and bronchiectasis predominantly in the airways to the lower lungs. Chronic aspiration is suspected.  4.  Enlargement of the left heart. There is aortic tortuosity.  5.  Atherosclerotic disease including coronary artery calcification.    Echo Oct 2018    Left ventricle is severely dilated with severely reduced systolic function. Severe eccentric hypertrophy is present.    The calculated left ventricular ejection fraction is 26%.    Normal right ventricular size and systolic function.    Mitral valve annulus is dilated resulting in moderate functional mitral regurgitation.    No previous study for comparison.    PFT's  11/5/2018  FEV1 1.39L 56%  FVC 70%  Ratio 0.59, post-BD ratio 0.66 (> LLN 0.62 for pre-BD)  Flow volume loop suggests obstruction  %  %  RV/%  DLco 56% ellie for hgb  Impression: moderate obstructive physiology with significant improvement after bronchodilator. Technically does not meet criteria for COPD as post-BD ratio is 0.66, although FV loop " appearance does suggest obstruction.  Hyperinflation and air trapping is present.  Moderate impairment in diffusing capacity for CO.    Garrett (Rome Mclaughlin MD  [facility] Pulmonary & Critical Care  Pager [phone]  Clinic [phone]

## 2021-06-21 NOTE — PROGRESS NOTES
Heart failure Education for Tandem Visits    Patient seen in clinic today, accompanied by his wife, for continued HF education.  Patient and his wife viewed 'What is heart Failure' video which covers risk factors, symptoms, medications, Na and fluid guidelines, balancing activity and rest, and daily monitoring of symptoms.      Addressed patient's and his wife's questions/concerns - understanding verbalized of discussion - patient will plan to f/u with Dr. Moran 12-19-18 as scheduled and f/u in HF clinic in 7-8wks - will continue to reinforce HF management education with any future patient encounters.  mg

## 2021-06-21 NOTE — PROGRESS NOTES
Patient seen in clinic for HF education s/p recent hospital discharge 10/23/18.  Reviewed HF Binder that includes the  HF Sx Awareness & Action plan  handout and  A Stronger Pump  booklet and Weight log booklet highlighting :  __X_patient s type of heart failure _X__Na management in diet  __X_importance of daily wts  _X__Fluid Guidelines, if applicable  __X_medication review and importance of compliance     Instructed patient in signs and sx of heart failure, reiterated when to call clinic - reviewed HF hotline # [phone] and after hours call # [phone].  Majority of time was spent reviewing: type of heart failure, how to weigh self, medications.  Patient verbalized understanding of HF discussion.  Plan for f/u with continued HF education reviewed.  No formal f/u scheduled with nurse clinician for continued education - will continue to reinforce HF management education.  Patient will f/u with Dr Moran in 4 weeks, and Edgar in 2 weeks.

## 2021-06-21 NOTE — PROGRESS NOTES
PA was denied for Symbicort inhaler instructed to try Advair or Breo. Will order Advair 250/50 [per Dr. Gottlieb.  Call placed to Billie and message left with the change. Instructed to call with any questions or concerns.

## 2021-06-21 NOTE — PROGRESS NOTES
Hospital discharge follow up call to pt, no answer.  LVM that RN will try back another time, & reminded pt of their upcoming hospital f/u appt w/Dr. Floyd, 10/26/18, 11:20am.    Valentina Roger RN Care Manager, Population Health

## 2021-06-21 NOTE — PROGRESS NOTES
MTM Transition of Care Encounter  Assessment & Plan                                                       1. Dilated cardiomyopathy (H)  Needs improvement.  Patient would benefit from switching metoprolol to nightly at bedtime to see if this assists with decreasing fatigue throughout the day.  -Patient will switch metoprolol to bedtime.    Future considerations: digoxin level in future once reached steady state.    2. Chronic obstructive pulmonary disease, unspecified COPD type (H)?  Needs improvement.  Patient would benefit from using ipratropium albuterol nebulizing solution 4 times daily to assess with shortness of breath/dyspnea.  Patient to follow-up with pulmonology.  If in future patient does have definitive diagnosis of COPD, patient may benefit from combination LAMA/LABA inhaler rather than ipratropium albuterol to decrease frequency of use throughout the day.   -Patient can use ipratropium albuterol up to 4 times daily (every 6 hours).    -Discussed with patient that he should continue to elevate head at night, can buy pillow to assist with elevating head that ensures he does not roll over in the middle of the night.    3. Encounter for urinary catheter  Stable.  Patient to follow-up with urology.      4. Vitamin deficiency  Stable.    5. Constipation  Stable.    Follow Up  Patient to call in future if she has questions or concerns regarding her medications.  We will leave follow-up to patient discretion.    Subjective & Objective                                                       Billie Garcias is a 86 y.o. male coming in for a transitions of care visit. he was discharged from [facility] on 10/23/18 for dilated cardiomyopathy. Billie is joined by wife. Seeing patient prior to hospital f/u with Dr. Floyd.     Chief Complaint: Billie and wife would like to know what each medication is used for.     Medication Adherence/Access: Takes medications directly out of the bottle.    Dilated  Cardiomyopathy: Current medications include: digoxin 125 mcg daily with supper, metoprolol XL 37.5 mg daily in AM, lisinopril 5 mg daily, spironolactone 25 mg daily, furosemide 40 mg daily in AM. Patient finding that he has been feeling fatigued since dicharged. Slight dizziness at times (usually upon standing), but not bothersome. No falls. Not monitoring blood pressure at home. Watching alcohol and salt intake. Finding that his lungs are congested at night. Does sleep with head elevated, but when he rolls over off of the pillow he awakens from congestion with lying flat.    Ref. Range 10/23/2018 06:36   Creatinine Latest Ref Range: 0.70 - 1.30 mg/dL 0.86   GFR MDRD Af Amer Latest Ref Range: >60 mL/min/1.73m2 >60      Ref. Range 10/23/2018 06:36   Potassium Latest Ref Range: 3.5 - 5.0 mmol/L 4.3     COPD?: Currently using ipratropium/albuterol 1 vial two times a day. Finding short of breath at night when lying flat. Has albuterol HFA inhaler but doesn't use. Patient is wondering if he can take nebulizing solution more frequently. He is following up with Pulmonology on 11/05.  Per hospital records patient does not have definitive diagnosis for COPD.  Patient does find that he does have increased mucus production.    Urinary catheter: Currently using tamsulosin 0.4 mg daily. Also using catheter. Finds the catheter burdensome. Per patient, following up with with Urologist. No medication side effects.     General Health: Taking multivitamin 1 tablet daily. No concerns.     Bowel movement regularity: Taking psyllium 1 capsule two times a day PRN and senna-docusate 1 tabelt two times a day PRN. Has not had to use since out of hospital. Has been having a bowel movement every day. No concerns.     PMH: reviewed in EPIC   Allergies/ADRs: reviewed in EPIC   Alcohol: reviewed in EPIC   Tobacco:   History   Smoking Status     Former Smoker     Packs/day: 1.00     Years: 16.00     Types: Cigarettes     Quit date: 4/15/1966    Smokeless Tobacco     Never Used     Recent Vitals:   BP Readings from Last 3 Encounters:   10/25/18 132/66   10/23/18 136/72   09/24/18 151/78      Wt Readings from Last 3 Encounters:   10/22/18 163 lb (73.9 kg)   09/24/18 170 lb (77.1 kg)   08/31/18 174 lb 4 oz (79 kg)     ----------------    Much or all of the text in this note was generated through the use of Dragon Dictate voice-to-text software. Errors in spelling or words which seem out of context are unintentional. Sound alike errors, in particular, may have escaped editing.    The patient was given a summary of these recommendations as an after visit summary    I spent 60 minutes with this patient today;  . I offer these suggestions for consideration by the PCP. A copy of the visit note was provided to the patient's provider.     Pippa Chan, PharmD   Medication Therapy Management Pharmacist   [facility]        Current Outpatient Prescriptions   Medication Sig Dispense Refill     albuterol (PROVENTIL) 2.5 mg /3 mL (0.083 %) nebulizer solution Take 3 mL (2.5 mg total) by nebulization every 4 (four) hours as needed for wheezing. 40 vial 0     digoxin (LANOXIN) 125 mcg tablet Take 1 tablet (125 mcg total) by mouth daily with supper. 30 tablet 0     FA/MV,CA,IRON,MIN/LYCOPENE/LUT (MULTIVITAL ORAL) Take 1 tablet by mouth daily.       furosemide (LASIX) 40 MG tablet Take 1 tablet (40 mg total) by mouth daily. 30 tablet 0     ipratropium-albuterol (DUO-NEB) 0.5-2.5 mg/3 mL nebulizer Take 3 mL by nebulization 4 (four) times a day. 3 mL 0     lisinopril (PRINIVIL,ZESTRIL) 5 MG tablet Take 1 tablet (5 mg total) by mouth daily. 30 tablet 0     metoprolol succinate (TOPROL-XL) 25 MG Take 1.5 tablets (37.5 mg total) by mouth daily. 45 tablet 0     multivitamin with minerals (THERA-M) 9 mg iron-400 mcg Tab tablet Take 1 tablet by mouth daily.       psyllium (METAMUCIL) 0.52 gram capsule Take 0.52 g by mouth 2 (two) times a day.       senna-docusate  (PERICOLACE) 8.6-50 mg tablet Take 1 tablet by mouth 2 (two) times a day. Hold for diarrhea.  0     spironolactone (ALDACTONE) 25 MG tablet Take 1 tablet (25 mg total) by mouth daily. 30 tablet 0     tamsulosin (FLOMAX) 0.4 mg cap Take 1 capsule (0.4 mg total) by mouth Daily after breakfast. 30 capsule 0     No current facility-administered medications for this visit.

## 2021-06-21 NOTE — PROGRESS NOTES
Assessment: /    Plan:    1. Dilated cardiomyopathy (H)  HM1(CBC and Differential)    Magnesium    Renal Function Profile    HM1 (CBC with Diff)   2. Acute hypoxemic respiratory failure (H)     3. Bronchiectasis with acute exacerbation (H)         He has pulmonology appointment November 5.  He has cardiology appointment November 7.    He has home health nurse due to limited mobility.    He will contact the urology office to find out if he can be seen sooner due to a cancellation.    WBC is elevated, likely due to steroids.    Recheck here in 1 month, or sooner if any problem.    I reconciled all of his medications.        Subjective:    HPI:  Billie Garcias is an 86-year-old male presenting for follow-up of hospitalization.  He was at [facility] October 17 - October 23.  He had acute hypoxemic respiratory failure.  He was found to have dilated cardiomyopathy, with ejection fraction of 26%.  He initially requested DO NOT RESUSCITATE status, then requested full code.  CT demonstrated bronchiectasis with debris in the bronchi.  He had urinary retention, requiring Dejesus catheter.  He failed voiding trial.  Renal function panel on October 23 was normal, except for albumin low at 3.0.    Social Hx: He is accompanied today by his wife.    Review of Systems: No fever, chest pain, sputum production.      Current Outpatient Prescriptions   Medication Sig Dispense Refill     albuterol (PROVENTIL) 2.5 mg /3 mL (0.083 %) nebulizer solution Take 3 mL (2.5 mg total) by nebulization every 4 (four) hours as needed for wheezing. 40 vial 0     digoxin (LANOXIN) 125 mcg tablet Take 1 tablet (125 mcg total) by mouth daily with supper. 30 tablet 0     FA/MV,CA,IRON,MIN/LYCOPENE/LUT (MULTIVITAL ORAL) Take 1 tablet by mouth daily.       furosemide (LASIX) 40 MG tablet Take 1 tablet (40 mg total) by mouth daily. 30 tablet 0     ipratropium-albuterol (DUO-NEB) 0.5-2.5 mg/3 mL nebulizer Take 3 mL by nebulization 4 (four) times a  day. 3 mL 0     lisinopril (PRINIVIL,ZESTRIL) 5 MG tablet Take 1 tablet (5 mg total) by mouth daily. 30 tablet 0     metoprolol succinate (TOPROL-XL) 25 MG Take 1.5 tablets (37.5 mg total) by mouth daily. 45 tablet 0     multivitamin with minerals (THERA-M) 9 mg iron-400 mcg Tab tablet Take 1 tablet by mouth daily.       psyllium (METAMUCIL) 0.52 gram capsule Take 0.52 g by mouth 2 (two) times a day.       senna-docusate (PERICOLACE) 8.6-50 mg tablet Take 1 tablet by mouth 2 (two) times a day. Hold for diarrhea.  0     spironolactone (ALDACTONE) 25 MG tablet Take 1 tablet (25 mg total) by mouth daily. 30 tablet 0     tamsulosin (FLOMAX) 0.4 mg cap Take 1 capsule (0.4 mg total) by mouth Daily after breakfast. 30 capsule 0     No current facility-administered medications for this visit.          Objective:    Vitals:    10/26/18 1303   BP: 118/60   Pulse: 62       Gen:  NAD, VSS  Lungs:  normal  Heart:  normal  Abdomen:  No HSM, mass or tenderness    WBC 17.6    ADDITIONAL HISTORY SUMMARIZED (2): Reviewed the discharge summary by Dr. Rinaldi.  DECISION TO OBTAIN EXTRA INFORMATION (1): None.   RADIOLOGY TESTS (1): Reviewed chest CT.  LABS (1): I reviewed renal function panel and hemogram.  MEDICINE TESTS (1): Reviewed EKG and echocardiogram.  INDEPENDENT REVIEW (2 each): None.     Total Data Points: 5

## 2021-06-22 NOTE — PROGRESS NOTES
Assessment: /    Plan:    1. Rash  triamcinolone (KENALOG) 0.1 % cream       Recheck in 2 months, or sooner if any problem.      Subjective:    HPI:  Billie Garcias is an 86-year-old male presenting for evaluation of a rash.  This has involved the forearms.  He was instructed to stop Spironolactone 1 week ago, and did so.  The rash has been improving.    He uses his nebulizer.    The Dejesus catheter is out.      Social Hx: He is accompanied by his wife.    Review of Systems: No fever or sputum production.      Current Outpatient Medications   Medication Sig Dispense Refill     albuterol (PROVENTIL) 2.5 mg /3 mL (0.083 %) nebulizer solution Take 3 mL (2.5 mg total) by nebulization every 4 (four) hours as needed for wheezing. 40 vial 0     digoxin (LANOXIN) 125 mcg tablet Take 1 tablet (125 mcg total) by mouth daily with supper. 30 tablet 2     FA/MV,CA,IRON,MIN/LYCOPENE/LUT (MULTIVITAL ORAL) Take 1 tablet by mouth daily.       fluticasone-salmeterol (ADVAIR DISKUS) 250-50 mcg/dose DISKUS Inhale 1 puff 2 (two) times a day. 1 each 6     furosemide (LASIX) 40 MG tablet Take 0.5 tablets (20 mg total) by mouth daily. 30 tablet 2     ipratropium-albuterol (DUO-NEB) 0.5-2.5 mg/3 mL nebulizer Take 3 mL by nebulization 4 (four) times a day. 3 mL 0     lisinopril (PRINIVIL,ZESTRIL) 5 MG tablet Take 1 tablet (5 mg total) by mouth daily. (Patient taking differently: Take 5 mg by mouth at bedtime .      ) 30 tablet 2     metoprolol succinate (TOPROL-XL) 25 MG Take 1 tablet (25 mg total) by mouth daily. (Patient taking differently: Take 25 mg by mouth at bedtime .      ) 45 tablet 2     psyllium (METAMUCIL) 0.52 gram capsule Take 0.52 g by mouth 2 (two) times a day.       tamsulosin (FLOMAX) 0.4 mg cap Take 1 capsule (0.4 mg total) by mouth daily. 30 capsule 2     triamcinolone (KENALOG) 0.1 % cream Apply to affected skin 2 times daily. 80 g 2     No current facility-administered medications for this visit.   Routine PP care           Objective:    Vitals:    11/30/18 1344   BP: 94/60   Pulse: 73   Resp: 19   Temp: 97.5  F (36.4  C)   SpO2: 98%       Gen:  NAD, VSS  Lungs:  normal  Heart:  normal  Skin with red macules and patches on the forearms.        ADDITIONAL HISTORY SUMMARIZED (2): None.  DECISION TO OBTAIN EXTRA INFORMATION (1): None.   RADIOLOGY TESTS (1): None.  LABS (1): None.  MEDICINE TESTS (1): None.  INDEPENDENT REVIEW (2 each): None.     Total Data Points: 0

## 2021-06-23 NOTE — TELEPHONE ENCOUNTER
Monthly follow up phone call for the COPD Program. No answer and no way to leave message. Will continue with calls.

## 2021-06-23 NOTE — PROGRESS NOTES
Pulmonary Clinic Follow-up Visit    86M, formed smoker, Torres's esophagus with chronic aspiration, with a obstructive lung disease/COPD likey due to prior smoking history and chronic bronchiectasis, presents for follow up of the above. He appears to be doing well on current regimen without any recent exacerbations. He does need to remember to do the chin tuck maneuver to minimize aspiration.     Recommendations:  - Continue Advair diskus, rinse/gargle after each use. Consider dose reduction next visit.   - continue the nebs and use flutter valve to promote secretion clearance  - encouraged him to keep active and exercise as able  - reinforced chin tuck/aspiration precautions to minimize aspiration.  - UTD with flu and PCV13; will give PSV23 today.  - since he appears to be doing well with few symptoms, will hold off on pulmonary rehab referral for now.     Follow up in 6 months.        CCx: follow up of bronchiectasis    HPI: Interim history: I last saw Billie on 11/5/2018. Since that time, he's been doing well. No pulmonary infections or hospitalizations. He does cough daily, sometimes 6x/day clear phlegm; mostly if he forgets to do the chin tuck maneuver when he's eating, he'll aspirate liquids. If he does the chin tuck he does not cough.  Using advair as directed. Using nebs with flutter valve as needed.  Able to climb stairs without breathing limitations.  No weight loss.      ROS:  A 12-system review was obtained and was negative with the exception of the symptoms endorsed in the history of present illness.    PMH:  Past Medical History:   Diagnosis Date     Bronchiectasis (H)      COPD (chronic obstructive pulmonary disease) (H)      DNAR (do not attempt resuscitation)      Hyperlipidemia      Left bundle branch block (LBBB) 8/23/2018     Lung nodule        PSH:  Past Surgical History:   Procedure Laterality Date     HEMORRHOID SURGERY  1951     MD ESOPHAGOGASTRODUODENOSCOPY TRANSORAL DIAGNOSTIC N/A  10/21/2018    Procedure: ESOPHAGOGASTRODUODENOSCOPY (EGD) with biopsy;  Surgeon: Masoud Machuca MD;  Location: [facility];  Service: Gastroenterology     TONSILLECTOMY         Allergies:  Allergies   Allergen Reactions     Spironolactone Rash     patient broke out in very bad rash on both arms.        Family HX:  Family History   Problem Relation Age of Onset     Cancer Mother      Other Father 35         in a car accident     No Medical Problems Brother      No Medical Problems Brother      No Medical Problems Sister      No Medical Problems Sister      No Medical Problems Sister      No Medical Problems Daughter      No Medical Problems Daughter        Social Hx:  Social History     Socioeconomic History     Marital status:      Spouse name: Louann     Number of children: 2     Years of education: Not on file     Highest education level: Not on file   Social Needs     Financial resource strain: Not on file     Food insecurity - worry: Not on file     Food insecurity - inability: Not on file     Transportation needs - medical: Not on file     Transportation needs - non-medical: Not on file   Occupational History     Occupation: manufacturing work     Employer: RETIRED     Comment: [employer] in [place]   Tobacco Use     Smoking status: Former Smoker     Packs/day: 1.00     Years: 16.00     Pack years: 16.00     Types: Cigarettes     Last attempt to quit:      Years since quittin.0     Smokeless tobacco: Never Used   Substance and Sexual Activity     Alcohol use: No     Drug use: No     Sexual activity: Not Currently     Partners: Female   Other Topics Concern     Not on file   Social History Narrative    He lives with his wife.       Current Meds:  Current Outpatient Medications   Medication Sig Dispense Refill     albuterol (PROVENTIL) 2.5 mg /3 mL (0.083 %) nebulizer solution Take 3 mL (2.5 mg total) by nebulization every 4 (four) hours as needed for wheezing. 40 vial 0      digoxin (LANOXIN) 125 mcg tablet Take 1 tablet (125 mcg total) by mouth daily with supper. 30 tablet 2     FA/MV,CA,IRON,MIN/LYCOPENE/LUT (MULTIVITAL ORAL) Take 1 tablet by mouth daily.       fluticasone-salmeterol (ADVAIR DISKUS) 250-50 mcg/dose DISKUS Inhale 1 puff 2 (two) times a day. 1 each 6     furosemide (LASIX) 40 MG tablet Take 0.5 tablets (20 mg total) by mouth daily. 30 tablet 2     ipratropium-albuterol (DUO-NEB) 0.5-2.5 mg/3 mL nebulizer Take 3 mL by nebulization 4 (four) times a day. 3 mL 0     lisinopril (PRINIVIL,ZESTRIL) 5 MG tablet Take 1 tablet (5 mg total) by mouth daily. (Patient taking differently: Take 5 mg by mouth at bedtime .      ) 30 tablet 2     metoprolol succinate (TOPROL-XL) 25 MG Take 1 tablet (25 mg total) by mouth daily. (Patient taking differently: Take 25 mg by mouth at bedtime .      ) 45 tablet 2     psyllium (METAMUCIL) 0.52 gram capsule Take 0.52 g by mouth 2 (two) times a day.       tamsulosin (FLOMAX) 0.4 mg cap Take 1 capsule (0.4 mg total) by mouth daily. 30 capsule 2     triamcinolone (KENALOG) 0.1 % cream Apply to affected skin 2 times daily. 80 g 2     No current facility-administered medications for this visit.        Physical Exam:  /62   Pulse 60   Resp 20   Wt 158 lb 8 oz (71.9 kg)   SpO2 98% Comment: RA  BMI 25.97 kg/m    Gen: awake, alert, oriented, no distress  HEENT: nasal turbinates are unremarkable, no oropharyngeal lesions, no cervical or supraclavicular lymphadenopathy  CV: RRR, no M/G/R  Resp: mild rhonchi on the left base. Right is clear.   Abd: soft, nontender, no palpable organomegaly  Skin: no apparent rashes  Ext: no cyanosis, clubbing or edema  Neuro: alert, nonfocal    Labs:  Reviewed  Sputum 10/18 UF    Imaging studies:  CTA chest Oct 2018  IMPRESSION:   CONCLUSION:  1.  Study is limited by motion. Within limitations, no central or lobar PE. Segmental and subsegmental vessels are not adequately assessed.  2.  Enlargement of the pulmonary  arteries suggests pulmonary hypertension.  3.  Fluid throughout the esophagus raises the risk of aspiration and represents dysmotility. There is debris and bronchiectasis predominantly in the airways to the lower lungs. Chronic aspiration is suspected.  4.  Enlargement of the left heart. There is aortic tortuosity.  5.  Atherosclerotic disease including coronary artery calcification.    PFT's  FEV1/FVC is 0.59 and is reduced.  FEV1 is 1.39 L (56% predicted) and is reduced.  FVC is 2.36 L (70% predicted) and reduced.  There was improvement in spirometry after a single inhaled dose of bronchodilator.  TLC is 8.64 L (132% predicted) and is increased.  RV is 6.08 L (209% predicted) and is increased.  DLCO is 56% predicted and is reduced when it is corrected for hemoglobin.     Impression:  Full Pulmonary Function Test is abnormal  Spirometry is consistent with moderate-severe obstructive ventilatory defect.  Spirometry is consistent with reversibility.  There is hyperinflation.  There is air-trapping.  Diffusion capacity when corrected for hemoglobin is moderately reduced.    Garrett (Kike) MD Arnoldo  [facility]  Pager [phone]  Clinic [phone]

## 2021-06-23 NOTE — PROGRESS NOTES
TCM DISCHARGE FOLLOW UP CALL    Discharge Date:  2/2/2019  Reason for hospital stay (discharge diagnosis)::  COPD exacerbation  Are you feeling better, the same or worse since your discharge?:  Patient is feeling worse  Why are you feeling worse?:  Very weak. He is able to transfer independently but it is slow. Pt is expectorating sputum. Wife is having him do arm exercises with a baton. She is encouraging him to walk short distances. Pt is spending a lot of time on the couch.  Denies fever. Wife is reminding pt to drink water.   Do you feel like you have a plan in the event of a health emergency?: No (Wife didn't know who to call when she had prednisone question)    (RN) Patient provided with information to call us in the event of a health emergency: Yes (Instructed wife she can call clinic anytime if she has health questions and speak with triage. )    As part of your discharge plan, were  home care services ordered for you?: No (RN offered home care PT but wife declined stating pt doesn't want it.)    Did you receive any new medications, or was there a change to your medications?: Yes    Are you taking those medications, or do you have any established regiment?:  Wife couldn't report how to do prednisone taper. Had wife write number of tabs needed daily on a calendar then she correctly reported how many tabs to give pt daily. Pt is getting nebs four times a day.  Do you have any follow up visits scheduled with your PCP or Specialist?:  Yes, with Specialist and Yes, with PCP (2/6 with Dr Floyd.)  Who are you seeing and when is it scheduled?:  Urologist   2/7/2019  RN NOTES::  Reviewed sx to watch for if pt needs to go to ED. Reminded wife she can call the clinic anytime if she has questions. Wife will try to keep tomorrow's PCP appt but it depends on the weather. Asked wife to review discharge instructions to keep info fresh in her mind.

## 2021-06-23 NOTE — TELEPHONE ENCOUNTER
----- Message from Vasquez Burgos sent at 1/28/2019  9:20 AM CST -----  Contact: Pts wife  General phone call:    Caller: Pts wife - Pat    Primary cardiologist: Dr Moran    Detailed reason for call: Pt has upcoming apt --- She is calling because Pt has trouble breathing and especially trouble breathing at night - she is asking about getting an O2 tank - she was not sure if we could help with that - but wanted to check     Would like a call back to explain -     New or active symptoms? Breathing troubles - ongoing  Best phone number: [phone]    Best time to contact: any    Ok to leave a detailed message? Yes    Device? Na    Additional Info: thank you

## 2021-06-23 NOTE — PROGRESS NOTES
CCC referral placed for this patient due to recent hospital admission. Wife of patient spoke with RN yesterday by phone who was following up regarding Billie's discharge from the hospital. Billie is not interested in enrolling in [facility] at this time as he has his wife who supports him and reports if he has any care needs he will address those with his medical providers directly by calling the clinic.

## 2021-06-23 NOTE — TELEPHONE ENCOUNTER
Return call to patient's wife Pat - informed her that O2 orders are usually obtained from pulmonologist or PCP after appropriate tests completed that indicate need - Pat denied patient having wt gain, fluid retention, dizziness and confirmed patient had recently had seen Dr. Mclaughlin 1-10-19 for COPD - instructed Pat to contact [facility] to address need for O2 - understanding verbalized.  mg

## 2021-06-23 NOTE — TELEPHONE ENCOUNTER
Wife Kristie called with concern as to how difficult Richard breathing is right now. Was unable to sleep last night, sat up in a chair all night and did his nebulizer. Said his breathing is loud and sounds crackly. Has been unable to do anything. Becomes to short of breath with any movement.  She is very concerned. Instructed to go to the Ed to be evaluated. Will call 911 and go to Ed, instructed to call me back if they need anything before the 911 team gets there.

## 2021-06-23 NOTE — TELEPHONE ENCOUNTER
FYI - Status Update  Who is Calling: [facility] Case Mgmt  Update: Patient was identified for telephonic case management following recent hospitalization due to COPD exacerbation.  Patient and wife have declined case management at this time, HP will close this out.  Okay to leave a detailed message?:  No return call needed

## 2021-06-23 NOTE — PATIENT INSTRUCTIONS - HE
Billie Garcias,    It was a pleasure to see you today at the [facility].     My recommendations after this visit include:    - No medications changes made today    - Follow up with Dr. Moran in 4 Weeks    -Schedule ECHOCARDIOGRAM    - Follow up in Heart Failure Clinic with Edgar 6-8 weeks    - Please call Alissa Dorsey RN on [phone], if you have any questions or concerns    Edgar Baptiste CNP

## 2021-06-24 NOTE — TELEPHONE ENCOUNTER
RN cannot approve Refill Request    RN can NOT refill this medication med is not covered by policy/route to provider. Last office visit: 11/30/2018 David Floyd MD Last Physical: 4/15/2016 Last MTM visit: Visit date not found Last visit same specialty: 2/22/2019 Jose Antonio Bryant MD.  Next visit within 3 mo: Visit date not found  Next physical within 3 mo: Visit date not found      Dayana Sharif, [facility] Connection Triage/Med Refill 2/25/2019    Requested Prescriptions   Pending Prescriptions Disp Refills     triamcinolone (KENALOG) 0.1 % cream [Pharmacy Med Name: TRIAMCINOLONE 0.1% CREAM 80GM] 80 g 0     Sig: APPLY EXTERNALLY TO THE AFFECTED AREA TWICE DAILY    There is no refill protocol information for this order

## 2021-06-24 NOTE — PROGRESS NOTES
" Patient ID: Billie Garcias is a 86 y.o. male.  /60   Pulse 61   Ht 5' 6.5\" (1.689 m)   Wt 146 lb 12.8 oz (66.6 kg)   SpO2 95%   BMI 23.34 kg/m      Assessment/Plan:                   Diagnoses and all orders for this visit:    Dilated cardiomyopathy (H)  -     Basic Metabolic Panel    Anemia, unspecified type  -     HM2(CBC w/o Differential)    Heart failure with reduced ejection fraction, NYHA class III (H)    Bronchiectasis without complication (H)      DISCUSSION  Health history reviewed in depth.  Obtain laboratory tests.  Reconciled medications.  Subjective:     HPI    Billie Garcias is a 86 y.o. male who is a new patient to me here at the [facility].  Medical history significant for COPD, systolic congestive heart failure, anemia, chronic cough, dilated cardia myopathy, bronchiectasis, hyperlipidemia, left bundle branch block who was recently admitted to the hospital and discharged home on January 29, 2019 for acute hypoxic respiratory failure secondary to COPD exacerbation.    Echocardiogram done January 30, 2019 shows ejection fraction 45% with global hypokinesis.  A CT PE run did not show pulmonary embolism but did show signs of pulmonary hypertension, bronchiectasis as well as incidental cholelithiasis.    The time of his last laboratory testing had normal renal function and no significant electrolyte disturbances.  He does have a chronic anemia with a hemoglobin of approximately 11.5.    Is here today with his significant other.    Patient's health history reviewed.  Medications reviewed and reconciled.  Reviewed consultation notes and other hospital notes.  Reviewed laboratory testing and imaging studies.  Discussed follow-up laboratory testing.    Review of Systems  Complete review of systems is obtained.  Other than the specific considerations noted above complete review of systems is negative.        Objective:   Medications:  Current Outpatient Medications " "  Medication Sig     albuterol (PROVENTIL) 2.5 mg /3 mL (0.083 %) nebulizer solution Take 3 mL (2.5 mg total) by nebulization every 4 (four) hours as needed for wheezing.     fluticasone-salmeterol (ADVAIR) 250-50 mcg/dose DISKUS Inhale 1 puff 2 (two) times a day.     furosemide (LASIX) 40 MG tablet Take 0.5 tablets (20 mg total) by mouth every other day. (Patient taking differently: Take 20 mg by mouth as needed.       )     ipratropium-albuterol (DUO-NEB) 0.5-2.5 mg/3 mL nebulizer Take 3 mL by nebulization 4 (four) times a day.     lisinopril (PRINIVIL,ZESTRIL) 5 MG tablet Take 1 tablet (5 mg total) by mouth at bedtime.     metoprolol succinate (TOPROL-XL) 25 MG Take 1 tablet (25 mg total) by mouth daily. (Patient taking differently: Take 25 mg by mouth at bedtime .      )     psyllium (METAMUCIL) 0.52 gram capsule Take 0.52 g by mouth 2 (two) times a day.     senna-docusate (PERICOLACE) 8.6-50 mg tablet Take 1 tablet by mouth 2 (two) times a day.     tamsulosin (FLOMAX) 0.4 mg cap Take 1 capsule (0.4 mg total) by mouth daily.     digoxin (LANOXIN) 125 mcg tablet Take 1 tablet (125 mcg total) by mouth daily with supper.     triamcinolone (KENALOG) 0.1 % cream APPLY EXTERNALLY TO THE AFFECTED AREA TWICE DAILY     Allergies:  Allergies   Allergen Reactions     Spironolactone Rash     patient broke out in very bad rash on both arms.      Tobacco:   reports that he quit smoking about 62 years ago. His smoking use included cigarettes. He has a 16.00 pack-year smoking history. he has never used smokeless tobacco.     Physical Exam      /60   Pulse 61   Ht 5' 6.5\" (1.689 m)   Wt 146 lb 12.8 oz (66.6 kg)   SpO2 95%   BMI 23.34 kg/m            General Appearance:    Alert, cooperative, no distress   Eyes:   No scleral icterus or conjunctival irritation       Ears:    Normal TM's and external ear canals, both ears   Throat:   Lips, mucosa, and tongue normal; teeth and gums normal   Neck:   Supple, symmetrical, " trachea midline, no adenopathy;        thyroid:  No enlargement/tenderness/nodules   Lungs:     Clear to auscultation bilaterally, respirations unlabored, no wheezes or crackles   Heart:    Regular rate and rhythm, soft systolic murmur   Abdomen:    Soft, no distention, no tenderness on palpation, no masses, no organomegaly     Extremities:  No edema, no joint swelling or redness, no evidence of any injuries   Skin:  No concerning skin findings, no suspicious moles, no rashes   Neurologic:  On gross examination there is no motor or sensory deficit.  Patient walks with a normal gait

## 2021-06-24 NOTE — TELEPHONE ENCOUNTER
FYI - Status Update  Who is Calling: Spouse  Update: Questioning if refill can be processed today due to only having one tablet remaining.  Okay to leave a detailed message?:  No return call needed

## 2021-06-24 NOTE — PATIENT INSTRUCTIONS - HE
Billie Garcias,    It was a pleasure to see you today at the [facility].     My recommendations after this visit include:    - I changed your Furosemide from 20 mg daily to as needed. Please call if you experience persistent weight gain with 2-3 lbs two days in a row or 5 lbs in a wee with more shortness of breath, leg swelling, abdominal bloating, lightheaded or dizziness.     - Follow up with Dr. Moran in March 2019    - Follow up in Heart Failure Clinic with Edgar in 2-3 weeks    - Please call Alissa Dorsey RN on [phone], if you have any questions or concerns    Edgar Baptiste, CNP

## 2021-06-24 NOTE — TELEPHONE ENCOUNTER
Refill Approved    Rx renewed per Medication Renewal Policy. Medication was last renewed on 10/31/18.    Dayana Sharif, Care Connection Triage/Med Refill 2/25/2019     Requested Prescriptions   Pending Prescriptions Disp Refills     digoxin (LANOXIN) 125 mcg tablet 30 tablet 2     Sig: Take 1 tablet (125 mcg total) by mouth daily with supper.    Refill Protocol for Digoxin Passed - 2/25/2019  8:31 AM       Passed - LFT or AST or ALT on file in last 12 months    Albumin   Date Value Ref Range Status   02/03/2019 2.7 (L) 3.5 - 5.0 g/dL Final     Bilirubin, Total   Date Value Ref Range Status   01/31/2019 0.4 0.0 - 1.0 mg/dL Final     Alkaline Phosphatase   Date Value Ref Range Status   01/31/2019 54 45 - 120 U/L Final     AST   Date Value Ref Range Status   01/31/2019 28 0 - 40 U/L Final     ALT   Date Value Ref Range Status   01/31/2019 18 0 - 45 U/L Final     Protein, Total   Date Value Ref Range Status   01/31/2019 6.0 6.0 - 8.0 g/dL Final               Passed - PCP or prescribing provider visit in past 6 months or next 3 months    Last office visit with prescriber/PCP: 2/22/2019 OR same dept: 2/22/2019 Jose Antonio Bryant MD OR same specialty: 2/22/2019 Jose Antonio Bryant MD Last physical: Visit date not found  Last MTM visit: Visit date not found     Next appt within 3 mo: Visit date not found  Next physical within 3 mo: Visit date not found  Prescriber OR PCP: Jose Antonio Bryant MD  Last diagnosis associated with med order: 1. Dilated cardiomyopathy (H)  - digoxin (LANOXIN) 125 mcg tablet; Take 1 tablet (125 mcg total) by mouth daily with supper.  Dispense: 30 tablet; Refill: 2     If protocol passes may refill for 6 months if within 3 months of last provider visit (or a total of 9 months).         Passed - BMP on file in last 12 months    Sodium   Date Value Ref Range Status   02/22/2019 137 136 - 145 mmol/L Final     Potassium   Date Value Ref Range Status   02/22/2019 5.3 (H) 3.5 - 5.0 mmol/L Final      Chloride   Date Value Ref Range Status   02/22/2019 104 98 - 107 mmol/L Final     CO2   Date Value Ref Range Status   02/22/2019 26 22 - 31 mmol/L Final     BUN   Date Value Ref Range Status   02/22/2019 15 8 - 28 mg/dL Final     Creatinine   Date Value Ref Range Status   02/22/2019 0.86 0.70 - 1.30 mg/dL Final            Passed - CBC w/plts (hm2) on file in last 12 months    WBC   Date Value Ref Range Status   02/22/2019 6.3 4.0 - 11.0 thou/uL Final     Hemoglobin   Date Value Ref Range Status   02/22/2019 11.9 (L) 14.0 - 18.0 g/dL Final     Hematocrit   Date Value Ref Range Status   02/22/2019 36.4 (L) 40.0 - 54.0 % Final     Platelets   Date Value Ref Range Status   02/22/2019 206 140 - 440 thou/uL Final            Passed - ECG in last 12 months    ECG rhythm strip: No results found for this or any previous visit. ECG 12 lead MUSE: No results found for this or any previous visit. ECG 12 lead nursing unit:   Results for orders placed or performed during the hospital encounter of 01/29/19   ECG 12 lead nursing unit performed   Result Value Ref Range    SYSTOLIC BLOOD PRESSURE  mmHg    DIASTOLIC BLOOD PRESSURE  mmHg    VENTRICULAR RATE 140 BPM    ATRIAL RATE 140 BPM    P-R INTERVAL 120 ms    QRS DURATION 146 ms    Q-T INTERVAL 344 ms    QTC CALCULATION (BEZET) 525 ms    P Axis  degrees    R AXIS 71 degrees    T AXIS 261 degrees    MUSE DIAGNOSIS       ** Poor data quality, interpretation may be adversely affected  Sinus tachycardia with Premature supraventricular complexes  Left bundle branch block  Abnormal ECG  When compared with ECG of 17-OCT-2018 12:41,  No significant change was found  Confirmed by ALANA RIOS MD LOC:[facility] ([id number]) on 1/29/2019 3:25:05 PM              Passed - Magnesium in last 12 months    Magnesium   Date Value Ref Range Status   02/03/2019 2.1 1.8 - 2.6 mg/dL Final             Passed - Normal digoxin level in last 12 months    No results found for: PHENOBARB         Passed - Serum  creatinine in last 12 months    Creatinine   Date Value Ref Range Status   02/22/2019 0.86 0.70 - 1.30 mg/dL Final

## 2021-06-24 NOTE — TELEPHONE ENCOUNTER
Monthly follow up phone call for the COPD Program. No answer and no way to leave a message. Will continue with calls.

## 2021-06-25 NOTE — PATIENT INSTRUCTIONS - HE
Billie Garcias,    It was a pleasure to see you today at the [facility].     My recommendations after this visit include:    - No medications changes made today     - I did some lab work today  and will call you with results when available     -Please call if you experience rapid weight gain 2-3 lbs two days in a row or 5 lbs in a week with worsening shortness of breath, lightheaded, dizziness, abdominal bloating, and leg swelling     - Follow up with Dr. Moran as scheduled in March    - Follow up in Heart Failure Clinic with Edgar in 4 weeks    - Please call Alissa Dorsey RN on [phone], if you have any questions or concerns    Edgar Baptiste, CNP

## 2021-06-26 NOTE — PROGRESS NOTES
Progress Notes by Edgar Baptiste NP at 11/21/2018  1:30 PM     Author: Edgar Baptiste NP Service: -- Author Type: Nurse Practitioner    Filed: 11/21/2018  2:42 PM Encounter Date: 11/21/2018 Status: Signed    : Edgar Baptiste NP (Nurse Practitioner)           Click to link to [facility] Heart Care     [facility] HEART CARE NOTE      Assessment/Recommendations   Assessment:    1.  Dilated cardiomyopathy with systolic dysfunction, NYHA class III with EF of 26 % in October 2018: Well compensated except mild fatigue, mild shortness of breath on exertion, and crackles in bilateral bases.  His home weight is down about 5-6 pounds.  He denies PND orthopnea.  Reports following low-sodium diet.  Patient is experiencing some rash on the bilateral upper extremities.  He denies any itching.  He denies angioedema or difficulty breathing.    2. H/o  Hypotension: Blood pressure was 84/52 and today it is 108/60.  He feels some improvement in his fatigue and daytime sleepiness.    3.  His potassium was 5.2 on 11/7/2018.  [facility]    Plan:    1. Patient and his wife watched heart failure video today.  We discussed about holding his Spironolactone given new onset of rash.  Patient will call if the rash is not improving.  He was recommended to go to the emergency if angioedema or difficulty breathing.  Patient wants to hold off on cardiac rehab for now and wants to exercise at home.  He was recommended to avoid lifting heavy object or sudden heavy exertion due to low EF.     Continue  same dose of furosemide given he still has crackles in bilateral bases.  Patient was also encouraged to call if rapid weight loss with worsening fatigue, dry mouth or any signs of dehydration-may have to back off on furosemide.     Heart failure medications:  - Beta blocker therapy with metroprolol succinate 25 mg daily  - ACEI therapy with lisinopril 5 mg daily  - Aldosterone blocker therapy with  spironolactone 12.5 mg -stopped today due to possible cause for rash.d was stable.    - Diuretic therapy with furosemide 20 mg daily    2.  Patient has follow-up appointment with PCP on 11/30/18.  If his rash does not improve holding spironolactone, we may have to restart him back on his spironolactone and try holding other heart failure medications.    Follow up with Dr. Moran in December as scheduled and f/u with Edgar in 7-8 weeks in HF clinic.     History of Present Illness    Mr. Billie Garcias is a 86 y.o. male with a significant past medical history of COPD, bronchiectasis, chronic cough, anemia, dilated cardiomyopathy with systolic dysfunction with left bundle branch block, and urinary retention who is seen at [facility] heart failure clinic today for continued follow-up. Patient was recently admitted in  October, 2018 with acute hypoxemia with respiratory failure, acute bronchiectasis, and dilated cardiomyopathy with systolic dysfunction.  Patient also had issue with the urinary retention and was followed by urology and placed Dejesus catheter and started on Flomax.  Patient was seen by a cardiologist and was started on beta-blocker, ACE inhibitor, diuretic, aldosterone blocker, and digoxin.  Patient recently saw his pulmonary for the follow-up and has recommended cardiac rehab.    Today, patient presented to the heart failure clinic accompanied by his wife.  Patient reports improvement in his fatigue and daytime sleepiness since his heart failure medications were adjusted.  He saw his urology and had his Dejesus catheter removed and has been able to void without difficulties.  He reports his appetite is improving slowly.  He denies lightheadedness, shortness of breath, orthopnea, PND, palpitations, chest pain, abdominal fullness/bloating and lower extremity edema.      He is monitoring home weights which are stable between 247-250 pounds.  He is following a low sodium diet.  He is following  low-sodium diet at home.     Physical Examination Review of Systems   Vitals:    11/21/18 1324   BP: 108/60   Pulse: 72   Resp: 20     Body mass index is 26.22 kg/m .  Wt Readings from Last 3 Encounters:   11/21/18 160 lb (72.6 kg)   11/07/18 164 lb (74.4 kg)   11/05/18 159 lb (72.1 kg)       General Appearance:     Alert, cooperative and in no acute distress.   ENT/Mouth: membranes moist, no oral lesions or bleeding gums.      EYES:  no scleral icterus, normal conjunctivae   Neck: no carotid bruits or thyromegaly   Chest/Lungs:   lungs are clear to auscultation, no rales or wheezing, respirations unlabored   Cardiovascular:    Heart rate regular. Normal first and second heart sounds with 2/6 systolic murmurs, but no rubs, or gallops; the carotid, radial and posterior tibial pulses are intact, Jugular venous pressure flat with no HJR,  edema bilateral lower extremities    Abdomen:  Soft, nontender, nondistended, bowel sounds present   Extremities: no cyanosis or clubbing   Skin: warm, dry.    Neurologic: mood and affect are appropriate, alert and oriented x3      General: Weight Loss  Eyes: WNL  Ears/Nose/Throat: WNL  Lungs: Cough  Heart: WNL  Stomach: WNL  Bladder: WNL  Muscle/Joints: WNL  Skin: Rash  Nervous System: Daytime Sleepiness  Mental Health: WNL     Blood: Easy Bruising     Medical History  Surgical History Family History Social History   Past Medical History:   Diagnosis Date   ? Bronchiectasis (H)    ? COPD (chronic obstructive pulmonary disease) (H)    ? DNAR (do not attempt resuscitation)    ? Hyperlipidemia    ? Left bundle branch block (LBBB) 8/23/2018   ? Lung nodule     Past Surgical History:   Procedure Laterality Date   ? HEMORRHOID SURGERY  1951   ? [place] ESOPHAGOGASTRODUODENOSCOPY TRANSORAL DIAGNOSTIC N/A 10/21/2018    Procedure: ESOPHAGOGASTRODUODENOSCOPY (EGD) with biopsy;  Surgeon: Masoud Machuca MD;  Location: [facility];  Service: Gastroenterology   ? TONSILLECTOMY  1943    Family History    Problem Relation Age of Onset   ? Cancer Mother    ? Other Father 35         in a car accident   ? No Medical Problems Brother    ? No Medical Problems Brother    ? No Medical Problems Sister    ? No Medical Problems Sister    ? No Medical Problems Sister    ? No Medical Problems Daughter    ? No Medical Problems Daughter     Social History     Socioeconomic History   ? Marital status:      Spouse name: Louann   ? Number of children: 2   ? Years of education: Not on file   ? Highest education level: Not on file   Social Needs   ? Financial resource strain: Not on file   ? Food insecurity - worry: Not on file   ? Food insecurity - inability: Not on file   ? Transportation needs - medical: Not on file   ? Transportation needs - non-medical: Not on file   Occupational History   ? Occupation: manufacturing work     Employer: RETIRED     Comment: [employer] in [place]   Tobacco Use   ? Smoking status: Former Smoker     Packs/day: 1.00     Years: 16.00     Pack years: 16.00     Types: Cigarettes     Last attempt to quit:      Years since quittin.9   ? Smokeless tobacco: Never Used   Substance and Sexual Activity   ? Alcohol use: No   ? Drug use: No   ? Sexual activity: Not Currently     Partners: Female   Other Topics Concern   ? Not on file   Social History Narrative    He lives with his wife.          Medications  Allergies   Current Outpatient Medications   Medication Sig Dispense Refill   ? albuterol (PROVENTIL) 2.5 mg /3 mL (0.083 %) nebulizer solution Take 3 mL (2.5 mg total) by nebulization every 4 (four) hours as needed for wheezing. 40 vial 0   ? digoxin (LANOXIN) 125 mcg tablet Take 1 tablet (125 mcg total) by mouth daily with supper. 30 tablet 2   ? fluticasone-salmeterol (ADVAIR DISKUS) 250-50 mcg/dose DISKUS Inhale 1 puff 2 (two) times a day. 1 each 6   ? furosemide (LASIX) 40 MG tablet Take 0.5 tablets (20 mg total) by mouth daily. 30 tablet 2   ? ipratropium-albuterol  (DUO-NEB) 0.5-2.5 mg/3 mL nebulizer Take 3 mL by nebulization 4 (four) times a day. 3 mL 0   ? lisinopril (PRINIVIL,ZESTRIL) 5 MG tablet Take 1 tablet (5 mg total) by mouth daily. (Patient taking differently: Take 5 mg by mouth at bedtime .      ) 30 tablet 2   ? metoprolol succinate (TOPROL-XL) 25 MG Take 1 tablet (25 mg total) by mouth daily. (Patient taking differently: Take 25 mg by mouth at bedtime .      ) 45 tablet 2   ? FA/MV,CA,IRON,MIN/LYCOPENE/LUT (MULTIVITAL ORAL) Take 1 tablet by mouth daily.     ? psyllium (METAMUCIL) 0.52 gram capsule Take 0.52 g by mouth 2 (two) times a day.     ? tamsulosin (FLOMAX) 0.4 mg cap Take 1 capsule (0.4 mg total) by mouth daily. 30 capsule 2     No current facility-administered medications for this visit.       No Known Allergies      Lab Results    Chemistry CBC BNP   Lab Results   Component Value Date    CREATININE 1.14 11/07/2018    BUN 28 11/07/2018     11/07/2018    K 5.2 (H) 11/07/2018     11/07/2018    CO2 25 11/07/2018     Creatinine (mg/dL)   Date Value   11/07/2018 1.14   10/26/2018 1.23   10/23/2018 0.86   10/22/2018 0.84    Lab Results   Component Value Date    WBC 17.8 (H) 10/26/2018    HGB 16.3 10/26/2018    HCT 48.9 10/26/2018    MCV 97 10/26/2018     10/26/2018    Lab Results   Component Value Date     (H) 11/07/2018     BNP (pg/mL)   Date Value   11/07/2018 145 (H)   10/17/2018 416 (H)   08/23/2018 625 (H)        Edgar Baptiste CNP  [facility] Heart [facility]   Heart Failure Clinic         This note has been dictated using voice recognition software. Any grammatical, typographical, or context distortions are unintentional and inherent to the software

## 2021-06-26 NOTE — PROGRESS NOTES
Progress Notes by Edgar Baptiste NP at 11/7/2018  9:50 AM     Author: Edgar Baptiste NP Service: -- Author Type: Nurse Practitioner    Filed: 11/7/2018  1:35 PM Encounter Date: 11/7/2018 Status: Addendum    : Edgar Baptiste NP (Nurse Practitioner)    Related Notes: Original Note by Edgar Baptiste NP (Nurse Practitioner) filed at 11/7/2018  1:32 PM           Click to link to [facility] Heart Care     [facility] HEART CARE NOTE      Assessment/Recommendations   Assessment:    1. Dilated cardiomyopathy with systolic dysfunction and left bundle branch block, NYHA class III with EF of 26%: His weight is down 5 pounds since recent hospitalization.  However he continues to have shortness of breath and exertion and have excessive cough, and fatigue and daytime sleepiness.  His shortness of breath could be due to multifactorial including heart failure and bronchiectasis. He denies PND but has mild orthopnea.  He reports poor appetite.    2.  Hypotension: Blood pressure today is 84/52.  Likely drug-induced.  Denies lightheadedness or dizziness but excessive fatigue and daytime sleepiness.  He was recently started on beta-blocker, ACE inhibitor, aldosterone blocker and diuretic in the hospital    3. Bronchiectasis: seen by pulmonary recently.  On pulmonary regimen.  Per patient still waiting for 1 more medication pulmonary is aware    4.  Obstructive sleep apnea: Scored 7 for Randall Sleepiness Scale and 3.  Low to moderate risk.  His fatigue could be due to medication induced and with low blood pressure.  Will consider reassess if needed in the near future.      Plan:  1.  We discussed about the heart failure, signs and symptoms, medication management, and lifestyle management including low-sodium diet and exercise.  Patient and his wife met with the nurse clinician for heart failure education.      We discussed about making some adjustment to his heart failure medication given low blood pressure and excessive  fatigue and daytime sleepiness. We also discussed about possible initiating cardiac rehab given low EF and as recommended by pulmonary however patient is hesitant and not willing at this time.      -  Recommended to avoid heavy lift > 20 pounds due to low EF with increased risk of SCD.     Heart failure medications:  - Beta blocker therapy with metroprolol succinate decreased from 37.5 mg to 25 mg daily  - ACEI therapy with lisinopril 5 mg daily  - Aldosterone blocker therapy with spironolactone 12.5 mg daily.    - Diuretic therapy with furosemide decreased from 40 mg to 20 mg daily    2. Recommended adequate hydration at least 50 ounces of fluid or 5-6 glasses of fluid per day.  Patient was encouraged to call if persistent rapid weight loss.     3.  Obtaining BMP, BNP, magnesium, and digoxin level-pending.  Will consider making further dose adjustment to his heart failure medications once results available.    4.  Follow-up with pulmonary per their recommendation.  Patient was recommended to go to the emergency if worsening shortness of breath, new onset of fever or chills or worsening fatigue.    Follow up with Dr. Shah in December as scheduled and Edgar in 2 weeks     History of Present Illness    Mr. Billie Garcias is a 86 y.o. male with a significant past medical history of COPD, bronchiectasis, chronic cough, anemia, dilated cardiomyopathy with systolic dysfunction with left bundle branch block, and urinary retention who is seen at [facility] heart failure clinic today for posthospitalization follow-up. Patient was recently admitted from October 17 - October 23, 2018 with acute hypoxemia with respiratory failure, acute bronchitis [place], and dilated cardiomyopathy with systolic dysfunction.  Patient also had issue with the urinary retention and was followed by urology and placed Dejesus catheter and started on Flomax.  Patient was seen by a cardiologist and was started on beta-blocker, ACE  inhibitor, diuretic, aldosterone blocker, and digoxin.  He was recommended to follow-up in heart failure clinic. Patient recently saw his pulmonary for the follow-up and has recommended cardiac rehab    Today, patient presented to the heart failure clinic accompanied by his wife.  Patient reports overall improvement in his shortness of breath.  However he continues to have mild shortness of breath on exertion and cough.  He saw his pulmonary recently and recommended pulmonary regimen that he still waiting to get.  He is reports poor appetite.    He denies lightheadedness, shortness of breath, orthopnea, PND, palpitations, chest pain and abdominal fullness/bloating.  Patient has an appointment with urology this Friday for possible voiding trial and removal of Dejesus catheter.    His discharge weight was 169 pounds.  Today in clinic his weight is 164 pounds.  He reports his home weight around 156 pounds.  He reports following low-sodium diet.  He is mostly sedentary    ECHO on 10/18/2018-reviewed:   Summary     Left ventricle is severely dilated with severely reduced systolic function. Severe eccentric hypertrophy is present.    The calculated left ventricular ejection fraction is 26%.    Normal right ventricular size and systolic function.    Mitral valve annulus is dilated resulting in moderate functional mitral regurgitation.    No previous study for comparison.            Physical Examination Review of Systems   Vitals:    11/07/18 0946   BP: (!) 84/52   Pulse: 72   Resp: 24     Body mass index is 25.69 kg/(m^2).  Wt Readings from Last 3 Encounters:   11/07/18 164 lb (74.4 kg)   11/05/18 159 lb (72.1 kg)   10/22/18 163 lb (73.9 kg)       General Appearance:     Alert, cooperative and in no acute distress.   ENT/Mouth: membranes moist, no oral lesions or bleeding gums.      EYES:  no scleral icterus, normal conjunctivae   Neck: no carotid bruits or thyromegaly   Chest/Lungs:   lungs are clear to auscultation, no  rales or wheezing, respirations unlabored but diminished globally with expiratory wheezing.   Cardiovascular:   Heart rate regular. Normal first and second heart sounds with no murmurs, rubs, or gallops; the carotid, radial and posterior tibial pulses are intact, Jugular venous pressure flat with no HJR, mild edema bilateral lower extremities    Abdomen:  Soft, nontender, nondistended, bowel sounds present  Genitourinary: Dejesus bag in place- Clear   Extremities: no cyanosis or clubbing   Skin: warm, dry.    Neurologic: mood and affect are appropriate, alert and oriented x3      General: Weight Loss  Eyes: Visual Distubance  Ears/Nose/Throat: Hearing Loss  Lungs: Cough, Wheezing  Heart: WNL  Stomach: WNL  Bladder: WNL  Muscle/Joints: WNL  Skin: WNL  Nervous System: WNL  Mental Health: WNL     Blood: Easy Bruising     Medical History  Surgical History Family History Social History   Past Medical History:   Diagnosis Date   ? Bronchiectasis (H)    ? COPD (chronic obstructive pulmonary disease) (H)    ? DNAR (do not attempt resuscitation)    ? Hyperlipidemia    ? Left bundle branch block (LBBB) 2018   ? Lung nodule     Past Surgical History:   Procedure Laterality Date   ? HEMORRHOID SURGERY     ? [place] ESOPHAGOGASTRODUODENOSCOPY TRANSORAL DIAGNOSTIC N/A 10/21/2018    Procedure: ESOPHAGOGASTRODUODENOSCOPY (EGD) with biopsy;  Surgeon: Masoud Machuca MD;  Location: [facility];  Service: Gastroenterology   ? TONSILLECTOMY      Family History   Problem Relation Age of Onset   ? Cancer Mother    ? Other Father 35      in a car accident   ? No Medical Problems Brother    ? No Medical Problems Brother    ? No Medical Problems Sister    ? No Medical Problems Sister    ? No Medical Problems Sister    ? No Medical Problems Daughter    ? No Medical Problems Daughter     Social History     Social History   ? Marital status:      Spouse name: Louann   ? Number of children: 2   ? Years of education: N/A      Occupational History   ? 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 Smoking status: Former Smoker     Packs/day: 1.00     Years: 16.00     Types: Cigarettes     Quit date: 1957   ? Smokeless tobacco: Never Used   ? Alcohol use No   ? Drug use: No   ? Sexual activity: Not Currently     Partners: Female     Other Topics Concern   ? Not on file     Social History Narrative    He lives with his wife.          Medications  Allergies   Current Outpatient Prescriptions   Medication Sig Dispense Refill   ? albuterol (PROVENTIL) 2.5 mg /3 mL (0.083 %) nebulizer solution Take 3 mL (2.5 mg total) by nebulization every 4 (four) hours as needed for wheezing. 40 vial 0   ? digoxin (LANOXIN) 125 mcg tablet Take 1 tablet (125 mcg total) by mouth daily with supper. 30 tablet 2   ? FA/MV,CA,IRON,MIN/LYCOPENE/LUT (MULTIVITAL ORAL) Take 1 tablet by mouth daily.     ? fluticasone-salmeterol (ADVAIR DISKUS) 250-50 mcg/dose DISKUS Inhale 1 puff 2 (two) times a day. 1 each 6   ? furosemide (LASIX) 40 MG tablet Take 0.5 tablets (20 mg total) by mouth daily. 30 tablet 2   ? ipratropium-albuterol (DUO-NEB) 0.5-2.5 mg/3 mL nebulizer Take 3 mL by nebulization 4 (four) times a day. 3 mL 0   ? lisinopril (PRINIVIL,ZESTRIL) 5 MG tablet Take 1 tablet (5 mg total) by mouth daily. 30 tablet 2   ? metoprolol succinate (TOPROL-XL) 25 MG Take 1 tablet (25 mg total) by mouth daily. 45 tablet 2   ? psyllium (METAMUCIL) 0.52 gram capsule Take 0.52 g by mouth 2 (two) times a day.     ? spironolactone (ALDACTONE) 25 MG tablet Take 0.5 tablets (12.5 mg total) by mouth daily. 15 tablet 2   ? tamsulosin (FLOMAX) 0.4 mg cap Take 1 capsule (0.4 mg total) by mouth daily. 30 capsule 2     No current facility-administered medications for this visit.       No Known Allergies      Lab Results    Chemistry CBC BNP   Lab Results   Component Value Date    CREATININE 1.23 10/26/2018    BUN 40 (H) 10/26/2018      (L) 10/26/2018    K 5.4 (H) 10/26/2018    CL 99 10/26/2018    CO2 23 10/26/2018     Creatinine (mg/dL)   Date Value   10/26/2018 1.23   10/23/2018 0.86   10/22/2018 0.84   10/21/2018 0.81    Lab Results   Component Value Date    WBC 17.8 (H) 10/26/2018    HGB 16.3 10/26/2018    HCT 48.9 10/26/2018    MCV 97 10/26/2018     10/26/2018    Lab Results   Component Value Date     (H) 10/17/2018     BNP (pg/mL)   Date Value   10/17/2018 416 (H)   08/23/2018 625 (H)        Edgar Baptiste CNP  [facility]   Heart Failure Clinic           This note has been dictated using voice recognition software. Any grammatical, typographical, or context distortions are unintentional and inherent to the software

## 2021-06-27 NOTE — PROGRESS NOTES
Progress Notes by Edgar Baptiste NP at 1/15/2019  1:30 PM     Author: Edgar Baptsite NP Service: -- Author Type: Nurse Practitioner    Filed: 1/15/2019  2:15 PM Encounter Date: 1/15/2019 Status: Signed    : Edgar Baptiste NP (Nurse Practitioner)           Click to link to [facility]     [facility] HEART CARE NOTE      Assessment/Recommendations   Assessment:    1.  Dilated cardiomyopathy with systolic dysfunction with LBBB, NYHA class III -EF of 26% in October 2018: He has no acute signs and symptoms of heart failure except fatigue and decrease in physical activity per wife.  He is on low-sodium diet.  His weight stable around 154 pounds at home.  His rash resolved after stopping the spironolactone.  He did not tolerate the higher dose of metoprolol in the past due to fatigue, excessive daytime sleepiness, and low blood pressure.  Most recent BMP [facility] with [facility] 5.1 [facility]     2.  History of hypotension: His blood pressure today is 118/52 and heart rate 62.  Improved since spironolactone was stopped and metoprolol dose was adjusted.    Plan:  1.  We discussed about the trial of increased dose of metoprolol but patient and his wife both very hesitant due to his previous experience with low blood pressure, fatigue, and excessive daytime sleepiness.  They want to hold off on further medication titration until echo results available.     Heart failure medications:  - Beta blocker therapy with metroprolol succinate 25 mg daily at bedtime  - ACEI therapy with lisinopril 5 mg daily at bedtime  -  Diuretic therapy with furosemide 20 mg daily    2.  Reinforced low-sodium diet, daily weight, and stay active as tolerated.  He has declined cardiac rehab in the past    3.  Will repeat echocardiogram to reevaluate ejection fraction.  If EF less than 35% will consider referring to EP for possible device placement.  Will discuss further with Dr. Moran, primary cardiologist once echo report  available.    Follow up with Dr. Moran in 4 weeks and with me in 6-8 weeks     History of Present Illness     Mr. Billie Garcias is a 86 y.o. male with a significant past medical history of COPD, bronchiectasis, chronic cough, urinary retention, anemia, and dilated cardiomyopathy with systolic dysfunction with left bundle branch block, who is seen at [facility] heart failure clinic today for continued follow-up.  His most recent echocardiogram in October 2018 showed severely reduced left ventricular systolic function with EF of 26% with moderate mitral regurgitation and severe eccentric hypertrophy.     During last heart failure clinic visit, his spironolactone was discontinued due to rashresolved after stopping the .  He had issues with low blood pressure, fatigue and excessive daytime sleepiness.  Therefore his metoprolol dose was reduced. Patient missed appointment with Dr. Moran last month. He recently saw pulmonary and has recommended him to continue the Advair disc inhaler and nebulizer treatment as needed.    Today, patient presented to the heart failure clinic accompanied by his wife.  He reports feeling overall improvement although wife reports that he continues to have fatigue and decreased physical activity since hospitalization.   He denies lightheadedness, shortness of breath, dyspnea on exertion, orthopnea, PND, palpitations, chest pain, abdominal fullness/bloating and lower extremity edema.  He has a chronic cough.    He is monitoring home weights which are stable around 154 pounds.  He is following a low sodium diet.  Patient does not exercise at home but he stays active.  He takes the stairs to the basement more than 3-4 times a day and denies feeling short of breath.     ECHO-Reviewed:   Results for orders placed during the hospital encounter of 10/17/18   Echo Complete [ECH10] 10/18/2018    Narrative   Left ventricle is severely dilated with severely reduced systolic   function.  Severe eccentric hypertrophy is present.    The calculated left ventricular ejection fraction is 26%.    Normal right ventricular size and systolic function.    Mitral valve annulus is dilated resulting in moderate functional mitral   regurgitation.    No previous study for comparison.        Physical Examination Review of Systems   Vitals:    01/15/19 1326   BP: 118/52   Pulse: 62   Resp: 12     Body mass index is 26.06 kg/m .  Wt Readings from Last 3 Encounters:   01/15/19 159 lb (72.1 kg)   01/10/19 158 lb 8 oz (71.9 kg)   11/30/18 159 lb 4 oz (72.2 kg)       General Appearance:     Alert, cooperative and in no acute distress.   ENT/Mouth: membranes moist, no oral lesions or bleeding gums.      EYES:  no scleral icterus, normal conjunctivae   Neck: no carotid bruits or thyromegaly   Chest/Lungs:   lungs are clear to auscultation, no rales or wheezing, respirations unlabored   Cardiovascular:    Heart rate regular. Normal first and second heart sounds with 2/6 systolic murmurs, rubs, or gallops; the carotid, radial and posterior tibial pulses are intact, Jugular venous pressure flat with no HJR, trace edema bilateral lower extremities    Abdomen:  Soft, nontender, nondistended, bowel sounds present   Extremities: no cyanosis or clubbing   Skin: warm, dry.    Neurologic: mood and affect are appropriate, alert and oriented x3      General: Weight Loss  Eyes: WNL  Ears/Nose/Throat: WNL  Lungs: Cough  Heart: WNL  Stomach: Constipation  Bladder: Frequent Urination at Night  Muscle/Joints: WNL  Skin: WNL  Nervous System: WNL  Mental Health: WNL     Blood: Easy Bleeding     Medical History  Surgical History Family History Social History   Past Medical History:   Diagnosis Date   ? At risk for aspiration    ? Torres's esophagus    ? Bronchiectasis (H)    ? COPD (chronic obstructive pulmonary disease) (H)    ? Dilated cardiomyopathy (H)    ? DNAR (do not attempt resuscitation)    ? Hiatal hernia    ? Hyperlipidemia    ?  Left bundle branch block (LBBB) 2018   ? Lung nodule     Past Surgical History:   Procedure Laterality Date   ? HEMORRHOID SURGERY     ? [place] ESOPHAGOGASTRODUODENOSCOPY TRANSORAL DIAGNOSTIC N/A 10/21/2018    Procedure: ESOPHAGOGASTRODUODENOSCOPY (EGD) with biopsy;  Surgeon: Masoud Machuca MD;  Location: [facility];  Service: Gastroenterology   ? TONSILLECTOMY      Family History   Problem Relation Age of Onset   ? Cancer Mother    ? Other Father 35         in a car accident   ? No Medical Problems Brother    ? No Medical Problems Brother    ? No Medical Problems Sister    ? No Medical Problems Sister    ? No Medical Problems Sister    ? No Medical Problems Daughter    ? No Medical Problems Daughter     Social History     Socioeconomic History   ? Marital status:      Spouse name: Louann   ? Number of children: 2   ? Years of education: Not on file   ? Highest education level: Not on file   Social Needs   ? Financial resource strain: Not on file   ? Food insecurity - worry: Not on file   ? Food insecurity - inability: Not on file   ? Transportation needs - medical: Not on file   ? Transportation needs - non-medical: Not on file   Occupational History   ? Occupation: manufacturing work     Employer: RETIRED     Comment: [employer] in [place]   Tobacco Use   ? Smoking status: Former Smoker     Packs/day: 1.00     Years: 16.00     Pack years: 16.00     Types: Cigarettes     Last attempt to quit:      Years since quittin.0   ? Smokeless tobacco: Never Used   Substance and Sexual Activity   ? Alcohol use: No   ? Drug use: No   ? Sexual activity: Not Currently     Partners: Female   Other Topics Concern   ? Not on file   Social History Narrative    He lives with his wife.          Medications  Allergies   Current Outpatient Medications   Medication Sig Dispense Refill   ? albuterol (PROVENTIL) 2.5 mg /3 mL (0.083 %) nebulizer solution Take 3 mL (2.5 mg total) by nebulization every  4 (four) hours as needed for wheezing. 40 vial 0   ? digoxin (LANOXIN) 125 mcg tablet Take 1 tablet (125 mcg total) by mouth daily with supper. 30 tablet 2   ? FA/MV,CA,IRON,MIN/LYCOPENE/LUT (MULTIVITAL ORAL) Take 1 tablet by mouth daily.     ? furosemide (LASIX) 40 MG tablet Take 0.5 tablets (20 mg total) by mouth daily. 30 tablet 2   ? ipratropium-albuterol (DUO-NEB) 0.5-2.5 mg/3 mL nebulizer Take 3 mL by nebulization 4 (four) times a day. 3 mL 0   ? lisinopril (PRINIVIL,ZESTRIL) 5 MG tablet Take 1 tablet (5 mg total) by mouth daily. (Patient taking differently: Take 5 mg by mouth at bedtime .      ) 30 tablet 2   ? metoprolol succinate (TOPROL-XL) 25 MG Take 1 tablet (25 mg total) by mouth daily. (Patient taking differently: Take 25 mg by mouth at bedtime .      ) 45 tablet 2   ? psyllium (METAMUCIL) 0.52 gram capsule Take 0.52 g by mouth 2 (two) times a day.     ? tamsulosin (FLOMAX) 0.4 mg cap Take 1 capsule (0.4 mg total) by mouth daily. 30 capsule 2   ? triamcinolone (KENALOG) 0.1 % cream Apply to affected skin 2 times daily. 80 g 2   ? fluticasone-salmeterol (ADVAIR DISKUS) 250-50 mcg/dose DISKUS Inhale 1 puff 2 (two) times a day. 1 each 6     No current facility-administered medications for this visit.       Allergies   Allergen Reactions   ? Spironolactone Rash     patient broke out in very bad rash on both arms.          Lab Results    Chemistry CBC BNP   Lab Results   Component Value Date    CREATININE 1.14 11/07/2018    BUN 28 11/07/2018     11/07/2018    K 5.2 (H) 11/07/2018     11/07/2018    CO2 25 11/07/2018     Creatinine (mg/dL)   Date Value   11/07/2018 1.14   10/26/2018 1.23   10/23/2018 0.86   10/22/2018 0.84    Lab Results   Component Value Date    WBC 17.8 (H) 10/26/2018    HGB 16.3 10/26/2018    HCT 48.9 10/26/2018    MCV 97 10/26/2018     10/26/2018    Lab Results   Component Value Date     (H) 11/07/2018     BNP (pg/mL)   Date Value   11/07/2018 145 (H)    10/17/2018 416 (H)   08/23/2018 625 (H)      Edgar Baptiste [facility]   Heart Failure Clinic           This note has been dictated using voice recognition software. Any grammatical, typographical, or context distortions are unintentional and inherent to the software

## 2021-06-27 NOTE — PROGRESS NOTES
Progress Notes by Juan Moran MD at 3/27/2019 11:30 AM     Author: Juan Moran MD Service: -- Author Type: Physician    Filed: 3/27/2019 12:15 PM Encounter Date: 3/27/2019 Status: Signed    : Juan Moran MD (Physician)           Click to link to [facility]     [facility] Clinic Note      Assessment:    1. Heart failure with reduced ejection fraction, NYHA class III (H)     2. Dilated cardiomyopathy (H)         Plan:    1. Continue current cardiovascular medical therapy.  2. He will return to see Edgar in the heart failure clinic on April 23 and Dr. Moran in 3 months.    An After Visit Summary was printed and given to the patient.    Subjective:    Billie Garcias returned for a planned post hospital follow up visit.    I met him in consultation last October at [facility] and he was actually due to see me 2 months later, so this visit is 3 months after that.  Billie has dementia and his wife, Louann, is really not sure why it took that long for them to see me.  Nonetheless, he has had close follow-up with our heart failure nurse practitioner and he has established care with his new personal physician, Dr. Jose Antonio Bryant.    He reports a cough which is chronic, wheezing which is chronic, easy bruising, easy bleeding, and daytime sleepiness.  Billie does not describe angina pectoris, orthopnea, palpitations, lightheadedness, edema, or syncope.    His wife had questions about urological medications and I asked her to take them up with the urologist they will be seeing tomorrow.  She was also not aware of the purpose of his four cardiovascular medications, despite extensive education in our heart failure clinic.    Past Medical History:    Patient Active Problem List   Diagnosis   ? Dilated cardiomyopathy (H)   ? Anemia   ? Left bundle branch block (LBBB)   ? Bronchiectasis (H)   ? Heart failure with reduced ejection fraction, NYHA class III (H)   ? At risk for  aspiration   ? Torres's esophagus   ? Hiatal hernia   ? DNAR (do not attempt resuscitation)   ? COPD (chronic obstructive pulmonary disease) (H)       Past Medical History:   Diagnosis Date   ? At risk for aspiration    ? Torres's esophagus    ? Bronchiectasis (H)    ? Chronic cough 2018   ? COPD (chronic obstructive pulmonary disease) (H) 2018    moderate to severe per PFT   ? Dilated cardiomyopathy (H)    ? DNAR (do not attempt resuscitation)    ? Ejection fraction < 50%     EF 26%   ? Hiatal hernia    ? Hyperlipidemia    ? Left bundle branch block (LBBB) 2018   ? Lung nodule        Past Surgical History:   Procedure Laterality Date   ? HEMORRHOID SURGERY     ? [place] ESOPHAGOGASTRODUODENOSCOPY TRANSORAL DIAGNOSTIC N/A 10/21/2018    Procedure: ESOPHAGOGASTRODUODENOSCOPY (EGD) with biopsy;  Surgeon: Masoud Machuca MD;  Location: [facility];  Service: Gastroenterology   ? TONSILLECTOMY          reports that he quit smoking about 62 years ago. His smoking use included cigarettes. He has a 16.00 pack-year smoking history. he has never used smokeless tobacco. He reports that he does not drink alcohol or use drugs.    Family History   Problem Relation Age of Onset   ? Cancer Mother    ? Other Father 35         in a car accident   ? Diabetes type II Brother    ? No Medical Problems Brother    ? Stroke Sister    ? No Medical Problems Sister    ? Stroke Sister    ? No Medical Problems Daughter    ? No Medical Problems Daughter    ? CABG Brother        Outpatient Encounter Medications as of 3/27/2019   Medication Sig Dispense Refill   ? albuterol (PROVENTIL) 2.5 mg /3 mL (0.083 %) nebulizer solution Take 3 mL (2.5 mg total) by nebulization every 4 (four) hours as needed for wheezing. 360 mL 6   ? digoxin (LANOXIN) 125 mcg tablet Take 1 tablet (125 mcg total) by mouth daily with supper. 90 tablet 1   ? fluticasone-salmeterol (ADVAIR) 250-50 mcg/dose DISKUS Inhale 1 puff 2 (two) times a day.     ?  ipratropium-albuterol (DUO-NEB) 0.5-2.5 mg/3 mL nebulizer Take 3 mL by nebulization 4 (four) times a day. 360 mL 6   ? lisinopril (PRINIVIL,ZESTRIL) 5 MG tablet Take 1 tablet (5 mg total) by mouth at bedtime. 90 tablet 3   ? metoprolol succinate (TOPROL-XL) 25 MG Take 1 tablet (25 mg total) by mouth daily. (Patient taking differently: Take 25 mg by mouth at bedtime .      ) 45 tablet 2   ? psyllium (METAMUCIL) 0.52 gram capsule Take 0.52 g by mouth 2 (two) times a day.     ? tamsulosin (FLOMAX) 0.4 mg cap Take 1 capsule (0.4 mg total) by mouth daily. 30 capsule 2   ? triamcinolone (KENALOG) 0.1 % cream APPLY EXTERNALLY TO THE AFFECTED AREA TWICE DAILY 80 g 0   ? furosemide (LASIX) 40 MG tablet Take 0.5 tablets (20 mg total) by mouth every other day. (Patient taking differently: Take 20 mg by mouth as needed.       ) 30 tablet 2   ? senna-docusate (PERICOLACE) 8.6-50 mg tablet Take 1 tablet by mouth 2 (two) times a day.  0     No facility-administered encounter medications on file as of 3/27/2019.        Review of Systems:     General: WNL  Eyes: WNL  Ears/Nose/Throat: WNL  Lungs: Cough, Wheezing  Heart: WNL  Stomach: WNL  Bladder: WNL  Muscle/Joints: WNL  Skin: WNL  Nervous System: Daytime Sleepiness  Mental Health: Anxiety     Blood: Easy Bleeding    Objective:     /54 (Patient Site: Left Arm, Patient Position: Sitting, Cuff Size: Adult Regular)   Pulse 60   Resp 16   Wt 145 lb (65.8 kg)   BMI 23.05 kg/m    Wt Readings from Last 5 Encounters:   03/27/19 145 lb (65.8 kg)   03/19/19 146 lb (66.2 kg)   02/22/19 146 lb 12.8 oz (66.6 kg)   02/19/19 145 lb (65.8 kg)   01/30/19 145 lb 6.4 oz (66 kg)        Physical Exam:    GENERAL APPEARANCE: alert, no apparent distress  EYES: no scleral icterus  NECK: no carotid bruits or adenopathy, jugular venous pressure is less than 5 cm  CHEST: symmetric, the lungs are clear to auscultation  CARDIOVASCULAR: regular rhythm without murmur or gallop  EXTREMITIES: no  cyanosis, clubbing or edema  SKIN: no xanthelasma  NEUROLOGIC: normal gait and coordination  PSYCHIATRIC: mood and affect are normal    Cardiac Testing:    Echocardiogram:   Results for orders placed during the hospital encounter of 01/29/19   Echo Complete [ECH10] 01/30/2019    Narrative   Mild concentric left ventricular hypertrophy.    Left ventricle ejection fraction is mildly decreased. The calculated   left ventricular ejection fraction is 45% with global hypokinesis.    Normal right ventricular size with mildly reduced systolic function.    Aortic valve sclerosis    When compared to the previous study dated 10/18/2018, overall left   ventricular systolic function has improved.          Imaging:    No results found.    Lab Results:    Lab Results   Component Value Date    CREATININE 0.86 02/22/2019    BUN 15 02/22/2019     02/22/2019    K 4.9 03/19/2019     02/22/2019    CO2 26 02/22/2019     Lab Results   Component Value Date    CHOL 215 (H) 04/15/2016    TRIG 87 04/15/2016    HDL 34 (L) 04/15/2016     BNP (pg/mL)   Date Value   03/20/2019 146 (H)   01/29/2019 240 (H)   11/07/2018 145 (H)     Creatinine (mg/dL)   Date Value   02/22/2019 0.86   02/03/2019 0.89   02/02/2019 0.87   01/31/2019 1.23     Magnesium (mg/dL)   Date Value   02/03/2019 2.1   02/02/2019 2.1   01/29/2019 2.0     LDL Calculated (mg/dL)   Date Value   04/15/2016 164 (H)   04/10/2015 150 (H)   04/08/2014 157 (H)     Lab Results   Component Value Date    WBC 6.3 02/22/2019    HGB 11.9 (L) 02/22/2019    HCT 36.4 (L) 02/22/2019    MCV 97 02/22/2019     02/22/2019           Much or all of the text in this note was generated through the use of the Dragon Dictate voice-to-text software. Errors in spelling or words which seem out of context are unintentional. Sound alike errors, in particular, may have escaped editing.

## 2021-06-27 NOTE — PROGRESS NOTES
Progress Notes by Edgar Baptiste NP at 3/19/2019 12:50 PM     Author: Edgar Baptiste NP Service: -- Author Type: Nurse Practitioner    Filed: 3/19/2019  4:13 PM Encounter Date: 3/19/2019 Status: Addendum    : Edgar Baptiste NP (Nurse Practitioner)    Related Notes: Original Note by Edgar Baptiste NP (Nurse Practitioner) filed at 3/19/2019  1:52 PM           Click to link to [facility] HEART CARE NOTE      Assessment/Recommendations   Assessment:    1. 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  However, wife reports patient has to sit up to breathe during the night.  Patient reports that he feels improvement in his breathing after doing the nebulizer treatment although sleeping has been difficult.  His clinic weight is stable around 145 pounds.  He is following low-sodium diet.  His appetite is slowly improving.  He continues to have fatigue and daytime sleepiness.    2.  Shortness of breath: Known COPD with recent hospitalization.  He is on nebulizer treatment and inhaler for COPD.    3.  Hyperkalemia: Recent lab work at PCP office noted potassium elevated at 5.3.  Per wife he is not drinking adequate fluid.    4.  Memory impairment: Wife reported patient is declining rapidly with his memory impairment.  Wife thinks that patient has dementia.  She also expressed feeling burned out and needs help and support and possible placement for him.      Plan:  1.  We discussed about obtaining his potassium level, BNP and digoxin level-pending   Heart failure medications:  - Beta blocker therapy with metroprolol succinate 25 mg daily at bedtime  - ACEI therapy with lisinopril 5 mg daily at bedtime  - Digoxin 125 MCG daily.  Given his EF has improved and with ongoing fatigue, may consider to discontinue digoxin-will discuss with Dr. Moran once digoxin level  available  - Diuretic therapy with furosemide 20 mg as needed    2.  Reinforced low-sodium diet, daily weight monitoring, and adequate fluid intake at least 1-1/2 L of fluid per day    3.  Follow-up with Dr. Bryant regarding memory impairment (Addendum: communicated to Dr. Bryant)    4.  Follow-up with Dr. Moran as scheduled     History of Present Illness     Mr. Billie Garcias is a 86 y.o. male with a significant past medical history of COPD, bronchiectasis, chronic cough, urinary retention, anemia, and dilated cardiomyopathy with systolic dysfunction with left bundle branch block, who is seen at [facility] heart failure clinic today for continued follow-up.  His repeat echocardiogram in January 2019 showed improvement in left ventricular systolic function with EF of 45% with global hypokinesis, mild concentric left ventricular hypertrophy and aortic valve sclerosis noted.    Patient was recently admitted from January 29 - February 3, 2019 with shortness of breath secondary to COPD exacerbation.  He was treated with antibiotics and was discharged home on steroid.  He also had issues with urinary retention.  He was seen by urologist and recommended outpatient follow-up with possible cystoscopy.  Patient had couple episodes of gross hematuria but resolved spontaneously.    During last heart failure clinic visit he has lost about 10 pounds since January 2019.  Therefore, his furosemide was changed to as needed.  Patient continues to have some orthopnea and feels improvement after using the nebulizer treatment.  He has not had further weight gain since the furosemide was discontinued.  Per wife, his appetite has slightly improved.  However, wife expressed concern about his excessive fatigue and daytime sleepiness and rapidly declining in his memory.  Wife thinks that patient is developing dementia.  She also expressed feeling burned out and needs help and support and possible help with placement for  patient.  She has not discussed this with the PCP yet as they just recently changed the PCP.    He denies lightheadedness, shortness of breath, PND, palpitations, chest pain, abdominal fullness/bloating and lower extremity edema.  Patient is sedentary and does not exercise.  He has a urology appointment regarding his recent issue with urinary retention.     ECHO-Reviewed:   Results for orders placed during the hospital encounter of 01/29/19   Echo Complete [ECH10] 01/30/2019    Narrative   Mild concentric left ventricular hypertrophy.    Left ventricle ejection fraction is mildly decreased. The calculated   left ventricular ejection fraction is 45% with global hypokinesis.    Normal right ventricular size with mildly reduced systolic function.    Aortic valve sclerosis    When compared to the previous study dated 10/18/2018, overall left   ventricular systolic function has improved.           Physical Examination Review of Systems   Vitals:    03/19/19 1250   BP: 138/60   Pulse: 60   Resp: 18     Body mass index is 23.21 kg/m .  Wt Readings from Last 3 Encounters:   03/19/19 146 lb (66.2 kg)   02/22/19 146 lb 12.8 oz (66.6 kg)   02/19/19 145 lb (65.8 kg)       General Appearance:     Alert, cooperative and in no acute distress.   ENT/Mouth: membranes moist, no oral lesions or bleeding gums.      EYES:  no scleral icterus, normal conjunctivae   Neck: no carotid bruits or thyromegaly   Chest/Lungs:   lungs are clear to auscultation, no rales or wheezing, respirations unlabored   Cardiovascular:    Heart rate regular. Normal first and second heart sounds with no murmurs, rubs, or gallops; the carotid, radial and posterior tibial pulses are intact, Jugular venous pressure flat with no HJR, no edema bilateral lower extremities    Abdomen:  Soft, nontender, nondistended, bowel sounds present   Extremities: no cyanosis or clubbing   Skin: warm, dry.    Neurologic: mood and affect are appropriate, alert and oriented x3       General: Weight Loss  Eyes: WNL  Ears/Nose/Throat: WNL  Lungs: Cough, Wheezing  Heart: WNL  Stomach: WNL  Bladder: WNL  Muscle/Joints: WNL  Skin: WNL  Nervous System: Daytime Sleepiness  Mental Health: WNL     Blood: WNL     Medical History  Surgical History Family History Social History   Past Medical History:   Diagnosis Date   ? At risk for aspiration    ? Torres's esophagus    ? Bronchiectasis (H)    ? COPD (chronic obstructive pulmonary disease) (H)    ? Decreased cardiac ejection fraction 10/2018    EF 26%   ? Dilated cardiomyopathy (H)    ? DNAR (do not attempt resuscitation)    ? Ejection fraction < 50%    ? Hiatal hernia    ? Hyperlipidemia    ? Left bundle branch block (LBBB) 2018   ? Lung nodule     Past Surgical History:   Procedure Laterality Date   ? HEMORRHOID SURGERY     ? DE ESOPHAGOGASTRODUODENOSCOPY TRANSORAL DIAGNOSTIC N/A 10/21/2018    Procedure: ESOPHAGOGASTRODUODENOSCOPY (EGD) with biopsy;  Surgeon: Masoud Machuca MD;  Location: [facility];  Service: Gastroenterology   ? TONSILLECTOMY      Family History   Problem Relation Age of Onset   ? Cancer Mother    ? Other Father 35         in a car accident   ? No Medical Problems Brother    ? No Medical Problems Brother    ? No Medical Problems Sister    ? No Medical Problems Sister    ? No Medical Problems Sister    ? No Medical Problems Daughter    ? No Medical Problems Daughter     Social History     Socioeconomic History   ? Marital status:      Spouse name: Louann   ? Number of children: 2   ? Years of education: Not on file   ? Highest education level: Not on file   Occupational History   ? Occupation: manufacturing work     Employer: RETIRED     Comment: [employer] plant in [place]   Social Needs   ? Financial resource strain: Not on file   ? Food insecurity:     Worry: Not on file     Inability: Not on file   ? Transportation needs:     Medical: Not on file     Non-medical: Not on file   Tobacco Use   ? Smoking  status: Former Smoker     Packs/day: 1.00     Years: 16.00     Pack years: 16.00     Types: Cigarettes     Last attempt to quit:      Years since quittin.2   ? Smokeless tobacco: Never Used   Substance and Sexual Activity   ? Alcohol use: No   ? Drug use: No   ? Sexual activity: Not Currently     Partners: Female   Lifestyle   ? Physical activity:     Days per week: Not on file     Minutes per session: Not on file   ? Stress: Not on file   Relationships   ? Social connections:     Talks on phone: Not on file     Gets together: Not on file     Attends Christian service: Not on file     Active member of club or organization: Not on file     Attends meetings of clubs or organizations: Not on file     Relationship status: Not on file   ? Intimate partner violence:     Fear of current or ex partner: Not on file     Emotionally abused: Not on file     Physically abused: Not on file     Forced sexual activity: Not on file   Other Topics Concern   ? Not on file   Social History Narrative    He lives with his wife.          Medications  Allergies   Current Outpatient Medications   Medication Sig Dispense Refill   ? albuterol (PROVENTIL) 2.5 mg /3 mL (0.083 %) nebulizer solution Take 3 mL (2.5 mg total) by nebulization every 4 (four) hours as needed for wheezing. 40 vial 0   ? digoxin (LANOXIN) 125 mcg tablet Take 1 tablet (125 mcg total) by mouth daily with supper. 90 tablet 1   ? fluticasone-salmeterol (ADVAIR) 250-50 mcg/dose DISKUS Inhale 1 puff 2 (two) times a day.     ? lisinopril (PRINIVIL,ZESTRIL) 5 MG tablet Take 1 tablet (5 mg total) by mouth at bedtime. 90 tablet 3   ? metoprolol succinate (TOPROL-XL) 25 MG Take 1 tablet (25 mg total) by mouth daily. (Patient taking differently: Take 25 mg by mouth at bedtime .      ) 45 tablet 2   ? psyllium (METAMUCIL) 0.52 gram capsule Take 0.52 g by mouth 2 (two) times a day.     ? tamsulosin (FLOMAX) 0.4 mg cap Take 1 capsule (0.4 mg total) by mouth daily. 30 capsule  2   ? triamcinolone (KENALOG) 0.1 % cream APPLY EXTERNALLY TO THE AFFECTED AREA TWICE DAILY 80 g 0   ? furosemide (LASIX) 40 MG tablet Take 0.5 tablets (20 mg total) by mouth every other day. (Patient taking differently: Take 20 mg by mouth as needed.       ) 30 tablet 2   ? ipratropium-albuterol (DUO-NEB) 0.5-2.5 mg/3 mL nebulizer Take 3 mL by nebulization 4 (four) times a day. 3 mL 0   ? senna-docusate (PERICOLACE) 8.6-50 mg tablet Take 1 tablet by mouth 2 (two) times a day.  0     No current facility-administered medications for this visit.       Allergies   Allergen Reactions   ? Spironolactone Rash     patient broke out in very bad rash on both arms.          Lab Results    Chemistry CBC BNP   Lab Results   Component Value Date    CREATININE 0.86 02/22/2019    BUN 15 02/22/2019     02/22/2019    K 5.3 (H) 02/22/2019     02/22/2019    CO2 26 02/22/2019     Creatinine (mg/dL)   Date Value   02/22/2019 0.86   02/03/2019 0.89   02/02/2019 0.87   01/31/2019 1.23    Lab Results   Component Value Date    WBC 6.3 02/22/2019    HGB 11.9 (L) 02/22/2019    HCT 36.4 (L) 02/22/2019    MCV 97 02/22/2019     02/22/2019    Lab Results   Component Value Date     (H) 01/29/2019     BNP (pg/mL)   Date Value   01/29/2019 240 (H)   11/07/2018 145 (H)   10/17/2018 416 (H)        Edgar Baptiste CNP  [facility]   Heart Failure Clinic           This note has been dictated using voice recognition software. Any grammatical, typographical, or context distortions are unintentional and inherent to the software

## 2021-06-28 NOTE — PROGRESS NOTES
Progress Notes by Dottie Ley CNP at 10/17/2019 12:30 PM     Author: Dottie Ley CNP Service: -- Author Type: Nurse Practitioner    Filed: 10/18/2019 10:25 AM Encounter Date: 10/17/2019 Status: Addendum    : Dottie Ley CNP (Nurse Practitioner)    Related Notes: Original Note by Dottie Ley CNP (Nurse Practitioner) filed at 10/17/2019  4:14 PM        Zestar Study    Physical Examination  For abnormal findings, please evaluate if the finding is Clinically Significant (by 'CS') or Not Clinically Significant (by 'NCS')  General Appearance Normal  Head and Neck  Normal  Lungs    Normal  Cardiovascular  Normal  Abdomen   Normal  Musculoskeletal/Extremities Normal   Lymph Nodes  Normal  Skin    Normal  Neurological   Normal   Tremor             Present tremor 3-4 bilateral hands NCS    If present, evaluate severity on 1-10 scale    Dottie Ley CNP

## 2021-06-28 NOTE — PROGRESS NOTES
Progress Notes by Behr-Holewinski, Sierra, RN at 10/17/2019 12:30 PM     Author: Behr-Holewinski, Sierra, RN Service: -- Author Type: Patient Access    Filed: 10/17/2019  4:14 PM Encounter Date: 10/17/2019 Status: Signed    : Behr-Holewinski, Sierra, RN (Registered Nurse)             Zestar Study Consent Visit    Study description: ECG and PPG Study: Zestar Study      Note time seated: 1248     Billie Garcias a 87 y.o. male , was seen in 2500 today to discuss participation in the Zestar study.   The consent discussion began on 10/15/19.  Please refer to phone call note from Gena  for more details.  The consent form was reviewed with the patient and Wife.     The review of the study included:    Study purpose     Conflict of interest    Device description      Study visits    Risks of participation    Benefits (if any)    Alternatives    Voluntary participation    Confidentiality     Compensation/costs of participation    Study stipends    Injury and legal rights    The subject was provided time to review the consent form and consider participation. his questions were answered to his satisfaction.   The patient has voluntarily agreed to participate in the above noted study.     The consent form version 25 Sept 2019 and HIPAA form version 11 June 2019 was signed 10/17/19 at 1309    The subject was provided with a copy of the consent form and HIPPA. A copy of the signed forms was forwarded to medical records.    No study procedures were done prior to Billie DELFINA Garcias providing informed consent.     Sierra Behr-Holewinski    Subject Restrictions During Study -Confirmed with Subject prior to any study procedures completed    Restrictions on jewelry, recreational drugs, caffeine, and exercise few days prior and during study.   1. Subjects should not consume excessive amount of caffeine (6 or more 8-oz cups of coffee, or more than 570 mg of caffeine from energy drinks, pills or similar substance) during their  "participation in the study.   2. Subjects should not consume excessive amount of alcohol for the duration of their participation in the study. A typical moderate amount is allowed during stage 3.   3. Subjects should not take any recreational drugs (including, but not limited to methamphetamines, cocaine, opioids, cannabis, LSD) for the duration of their participation in the study.   4. Subjects should not wear underwire bra or jewelry during the in-lab study (to not interfere with electrode placement and ECG data recordings).   5. Subject will not be permitted to have their cell phone or any electronic recording device on or with them during the in-lab test session(s).   6. Subjects under 22 years old will not be permitted to take ECG recordings through the ECG maykel on the wrist-worn devices.     For study stage 3 only   1. Subjects should only do high intensity exercise (e.g. sprinting, heavy lifting, etc.) in the morning upon awakening or else not at all   2. Subjects should abstain from swimming during the time of the study   3. Subjects should only shower in the morning upon awakening (or else not at all)   4. Female subjects are strongly suggested to wear non-underwire bras throughout this stage of the study     Sierra [facility]Wal[facility]      Study Data collections   Vitals  (TPBP)     Vitals:    10/17/19 1311 10/17/19 1317 10/17/19 1319   BP: 127/78 134/75 123/77   Patient Site: Left Arm Left Arm Left Arm   Patient Position: Sitting Sitting Sitting   Cuff Size: Adult Regular Adult Regular Adult Regular   Pulse: 66 69 67   Resp:  16    Temp: 97.5  F (36.4  C)     TempSrc: Oral     Weight:   143 lb (64.9 kg)   Height:   5' 5\" (1.651 m)      VS taken after 5 min rest     MAP 1    94  MAP 2      95   MAP 3             88        Body mass index is 23.8 kg/m .  male  6/27/1932  87 y.o.      Note time patient placed in supine position: 1324    Ethnicity   []   or    [x]  Not  or   Race   []  "  or    []    []  Black or   []   or Other   [x]  White  Physical Activity Level  per subject report:   []  0- Extremely Inactive []  1- Sedentary [x]  2- Moderately Active  []  3- Vigorously Active []  4- Extremely Active  Trained Athlete   [] Yes  [x] No     Enamorado's' Skin type   [] Type 1 [] Type 2 [x] Type 3    [] Type 4 [] Type 5 [] Type 6    Subject participated in previous ECG study at [facility]: [] Yes    [x] No    Past Medical History:   Diagnosis Date   ? At risk for aspiration 2018   ? Torres's esophagus 10/21/2018    Per medical records   ? Bronchiectasis (H) 2018    per medical records   ? Chronic cough 11/5/2018   ? COPD (chronic obstructive pulmonary disease) (H) 11/05/2018    moderate to severe per PFT   ? Dilated cardiomyopathy (H) 10/2018   ? DNAR (do not attempt resuscitation)    ? HFrEF (heart failure with reduced ejection fraction) (H) 10/2018    EF 26%   ? Hiatal hernia 2018   ? Hyperlipidemia 2014    Per Medical records   ? Left bundle branch block (LBBB) 8/23/2018   ? Lung nodule 2016    Noted on XRay   ? Myocardial infarction (H) 2018   ? Urinary retention 2018    Per Medical Records       HISTORY OF HEART RHYTHM ABNORMALITIES   []  None   []  Atrial Flutter  [] Frequent PACs (>3 in 30 secs)  [] AF (permanent)  []  Tachycardia  [] Bigeminy, trigeminy, and/or quadgeminy  []  AF (persistent)  []  Heart Block   []  AF (paroxysmal)  [] PVCs    [] AF (other)    [x] BBB    []  Other:   How many years? 1  Special interest allergies: active allergic skin reactions  Allergies   Allergen Reactions   ? Spironolactone Rash     patient broke out in very bad rash on both arms.        Current Outpatient Medications:   ?  acetaminophen (TYLENOL) 500 MG tablet, Take 500 mg by mouth at bedtime as needed for pain. Takes 2 pills at night if having difficulty sleeping, Disp: , Rfl:   ?  albuterol (PROVENTIL) 2.5 mg /3 mL (0.083  "%) nebulizer solution, Take 3 mL (2.5 mg total) by nebulization every 4 (four) hours as needed for wheezing., Disp: 360 mL, Rfl: 6  ?  digoxin (LANOXIN) 125 mcg tablet, Take 1 tablet (125 mcg total) by mouth daily with supper., Disp: 90 tablet, Rfl: 1  ?  fluticasone-salmeterol (ADVAIR) 250-50 mcg/dose DISKUS, Inhale 1 puff 2 (two) times a day., Disp: , Rfl:   ?  furosemide (LASIX) 40 MG tablet, Take 0.5 tablets (20 mg total) by mouth every other day. (Patient taking differently: Take 20 mg by mouth as needed.    ), Disp: 30 tablet, Rfl: 2  ?  ipratropium-albuterol (DUO-NEB) 0.5-2.5 mg/3 mL nebulizer, Take 3 mL by nebulization 4 (four) times a day., Disp: 360 mL, Rfl: 6  ?  lisinopril (PRINIVIL,ZESTRIL) 5 MG tablet, Take 1 tablet (5 mg total) by mouth at bedtime., Disp: 90 tablet, Rfl: 3  ?  metoprolol succinate (TOPROL-XL) 25 MG, Take 1 tablet (25 mg total) by mouth daily. (Patient taking differently: Take 25 mg by mouth at bedtime .   ), Disp: 45 tablet, Rfl: 2  ?  psyllium (METAMUCIL) 0.52 gram capsule, Take 0.52 g by mouth 2 (two) times a day., Disp: , Rfl:   ?  senna-docusate (PERICOLACE) 8.6-50 mg tablet, Take 1 tablet by mouth 2 (two) times a day., Disp: , Rfl: 0  ?  tamsulosin (FLOMAX) 0.4 mg cap, Take 1 capsule (0.4 mg total) by mouth daily., Disp: 30 capsule, Rfl: 2  ?  triamcinolone (KENALOG) 0.1 % cream, APPLY EXTERNALLY TO THE AFFECTED AREA TWICE DAILY, Disp: 80 g, Rfl: 0      10-sec 12-lead ECG & 30-sec 12-lead ECG rhythm strip done; reviewed by & PE done by Antonina Ley  Subject Questionnaire    OCCUPATION: Retired   Predominately works outdoors  [] Yes    [x] No      Hours/week spent outdoors (total, not only for work): 10    Frequently participates in \"hand intensive\" activities [] Yes [x] No  Caffeine  []  Never  [] Occasionally        []  Daily (1 cup/day)     [x]  Daily (>1 cup/day)    Alcohol   [x]  Never  [] Light (drink or 2 occasionally) []  Moderate (a drink or 2 almost daily)   []  " "Occasional-heavy (more than a few drinks <2x / month)  [] Heavy (more than a few drinks >2x / month)    Tobacco/nicotine  [x]  Never  [] Rarely  []  Frequently/ Daily     Mattress Information  [] Subject did not participate in Stage 3  Mattress type:  []  Memory foam [] Gel  [x] Innerspring (coil)  [] Airbed  [] Waterbed [] Shikibuton  [] Hybrid  [] No mattress  [] Other (comment):    Mattress foundation   [] Mattress on floor/ground    [] Mattress on foundation/box spring on floor/ground  [x] Mattress on foundation/box spring on bed frame  [] Mattress on tatami on floor/ground  [] Other (comment):    Mattress topper   [x] No mattress topper  [] Pillow top  [] Foam top - flat style  [] Foam top - \"egg crate\" style  [] Other (comment):    Co-sleeper    []  Yes  [x]  No    CPAP use   [] Yes  [x] No      Dominant hand [x] left  [] right [] ambidextrous  Preferred Wrist to wear band on   [x]  left   []  right   Were screening day & study day: [x]  same [] different   Same: wrist circumference:      163   mm   Device wearing wrist skin fold thickness:       1.6  mm  Wrist Band Size:     [x]  Flush Fit S/M  []  Non-Flush Fit S/M   []  Flush Fit M/L  []  Non-Flush Fit M/L  []  Flush Fit XL  []  Non-Flush Fit XL    Preferred/natural band notch: 4  Secure band notch: 4  Crown orientation:  []  left   [x]  right  Device wearing wrist hairiness:     [x]  Light []  Medium       []  Heavy  Spectophotometer    L  A  B   Reading #1  70.36  8.81  14.87   Reading #2  70.16  9.01  15.10   Reading #3  70.17  8.52  15.29     Pregnancy test  for WOCBP    [x] n/a male or female not child bearing potential  Room Temperature ( C): 22  CS Laptop ID: [id number]  CS Cam ID:[id number]  Device Set ID:[id number]  Wrist Device ID:[id number]  Subject has now completed their in-house participation in the Zestar study. Subject will complete Stage 3 at home for the next 3 days and return the equipment on 10/21/19 [device return date].  Sierra Behr-Holewinski "

## 2021-06-28 NOTE — PROGRESS NOTES
Progress Notes by Monet Robbins at 10/21/2019  2:54 PM     Author: Monet Robbins Service: -- Author Type: Patient Access    Filed: 10/21/2019  2:54 PM Encounter Date: 10/21/2019 Status: Signed    : Monet Robbins (Patient Access)         Zestar Study Device Return    Billie Garcias returned all the devices for the Zestar study.  Billie aGrcias denies medication changes or adverse events since last visit.    Billie Garcias has now completed their participation in the Zestar study.   Thank you for your gift of participation.    Monet Robbins

## 2021-06-28 NOTE — PROGRESS NOTES
Progress Notes by Behr-Holewinski, Sierra, RN at 10/17/2019 12:30 PM     Author: Behr-Holewinski, Sierra, RN Service: -- Author Type: Patient Access    Filed: 10/25/2019  5:07 PM Encounter Date: 10/17/2019 Status: Signed    : Darek Hughes MD (Physician)    Related Notes: Original Note by Behr-Holewinski, Sierra, RN (Registered Nurse) filed at 10/17/2019  4:14 PM           [facility] Study Inclusion / Exclusion Criteria  Protocol Version 2.0 ([id number])    Inclusion Criteria    Yes No Criteria # Subject must meet all inclusion criteria:      [x]    []  1 At least 18 years old for NSR and 22 years old for Non-NSR. Inclusive for both cohorts, at time of screening, with no upper limit on age.        [x]      []  2 To be enrolled as a non-NSR subject the volunteer must have one of the following conditions: Permanent/Persistent AF, Hx of paroxysmal AF, Hx of High-rate AF, AF + rate control medication, Hx Atrial Flutter, PVC burden >1%, Frequent PACs, Hx BBB, Hx 2nd degree block (any type), Hx Bigeminy/Trigeminy/Quadgeminy, Hx Tachycardia. For subjects with any of the following diagnoses, the condition must be present at the time of screening:   ? Permanent/persistent AF  ? PVC [place]  ? Frequent PACs   Note: If not present at screening, subjects may not be enrolled as a non-NSR subject or NSR subject.         3  [x] N/A HE site For Subjects to be enrolled as NSR they must be in NSR at the time of screening as determined by the investigator. For subjects with occasional ectopic beats (<1% burden during screening), they should still qualify as individuals in the NSR cohort as long as they don't have a medical history/diagnosis of significant ectopic burden.    [x]  []  4 Able to read and understand a written ICF    [x]  []  5 Willing and able to participate in the study procedures and comply with its restrictions    [x]  []  6 Able to communicate effectively with study staff as well as understand and follow directions     All must be Yes    Exclusion Criteria-all must be no  Yes No Criteria # Subject must not meet any exclusion criteria:    []  [x]  1 Physical disability that prevents safe and adequate testing.   []  [x]  2 Pregnant women or women planning to become pregnant   []  [x]  3 Any acute illness or condition that may interfere with study procedures (e.g. cough, fever, sore throat, headache, sunburn, etc.)   []  [x]  4 Clinically significant hand tremors, as judged by the Investigator   []  [x]  5 Resting hypertension with systolic blood pressure ?161 mmHg or diastolic blood pressure ?101 mmHg (if at least 2 of 3 measurements meet this criteria)   []  [x]  6 Subjects with implanted cardiac devices such as a pacemaker or an automated implantable cardioverter- defibrillator (AICD)   []  [x]  7 Acute myocardial infarction (MI) within 90 days from the screening visit   []  [x]  8 Other cardiovascular disease that increases the risk to the subject or would render the data uninterpretable in the opinion of the Investigator (e.g., recent or ongoing unstable angina, significant valvular heart disease or chronic heart failure, myocarditis or pericarditis)    []  [x]  9 Acute pulmonary embolism, pulmonary infarction, or deep vein thrombosis within 90 days from the screening visit    []  [x]  10 Stroke or transient ischemic attack within 90 days from the screening visit    []  [x]  11 Known untreated medical conditions as determined by the Investigator, such as but not limited to significant anemia, important electrolyte imbalance and untreated or uncontrolled thyroid disease.    []  [x]  12 Any history of wrist surgery with scarring in the area of the sensor location on the wrist where the subject will be wearing the watch;    []  [x]  13 Open wound(s) on the wrist and forearm where the subject will be wearing the watch    []  [x]  14 Severe symptomatic (or active) overly dry/injured skin, skin disorders, or allergic skin reactions  such as eczema, rosacea, impetigo, dermatomyositis or allergic contact dermatitis on wrist and locations where the electrodes will be placed (e.g. chest, forearms, stomach), as determined by the investigator.    []  [x]  15 Tattoos, scars or moles in the area of the sensor location on the wrist where the subject will be wearing the watch    []  [x]  16 Device wearing Wrist circumference ? 129 mm or ? 246 mm    []  [x]  17 Known significant sensitivity to medical adhesives or isopropyl alcohol (for ECG electrode placement)    []  [x]  18 Known allergy or sensitivity to fluorocarbon-based synthetic rubber, such as contact dermatitis with fluoroelastomer bands primarily used in wrist worn fitness devices    []  [x]  19 Subjects with any Medical History, Physical exam, vital sign or any other study procedure finding/assessment that in the opinion of the investigator could compromise subject safety during study participation or interfere with the study integrity and/or the accurate assessment of the study objectives    []  [x]  20 Subject works for a company that develops or sells medical and/or fitness devices (e.g., ECG monitors, wearable fitness bands, sleep monitors, etc.) or are technology journalists (e.g., professional bloggers, TV, magazine, newspaper reporters, etc.)    []  [x]  21 Weight > 181 kg for subjects using the stationary bike and/or treadmill. Weight of ?138 kg for NSR subjects.    []  [x]  22 Subject is employed in shift work, or otherwise does not maintain a reasonably consistent day/night schedule (e.g. Subjects who go to bed after 4am).    []  [x]  23 Overnight travel planned during data collection nights    []  [x]  24 Non-NSR subjects should not have partaken in strenuous physical activity within 12 hours prior to screening    []  [x]  25 Non-NSR subjects with Atrial fibrillation categories: Subjects taking Class 1 or Class 3 antiarrhythmic agents such as the following may not take part in any  stage of the study: amiodarone, sotalol, dronedarone, ibutilide, dofetilide, propafenone, quinidine, procainamide, disopyramide, flecainide (Subjects taking class 2, 4 or 5 antiarrhythmic agents may take part the study).    []  [x]  26 Subjects who have both a history of paroxysmal AF and a [place] skin type measurement of VI    []  [x]  27 Subjects who have missing index fingers on both hands      Patient fulfills study entry criteria.  No exclusion criteria.    Sierra Behr-Holewinski

## 2021-07-14 PROBLEM — J44.1 COPD EXACERBATION (H): Status: RESOLVED | Noted: 2018-08-23 | Resolved: 2018-10-21

## 2021-07-14 PROBLEM — J96.01 ACUTE HYPOXEMIC RESPIRATORY FAILURE (H): Status: RESOLVED | Noted: 2018-10-17 | Resolved: 2018-11-05

## 2022-01-01 ENCOUNTER — APPOINTMENT (OUTPATIENT)
Dept: PHYSICAL THERAPY | Facility: HOSPITAL | Age: 87
DRG: 243 | End: 2022-01-01
Payer: COMMERCIAL

## 2022-01-01 ENCOUNTER — DOCUMENTATION ONLY (OUTPATIENT)
Dept: OTHER | Facility: CLINIC | Age: 87
End: 2022-01-01

## 2022-01-01 ENCOUNTER — TRANSCRIBE ORDERS (OUTPATIENT)
Dept: CARDIOLOGY | Facility: CLINIC | Age: 87
End: 2022-01-01

## 2022-01-01 ENCOUNTER — APPOINTMENT (OUTPATIENT)
Dept: CARDIOLOGY | Facility: HOSPITAL | Age: 87
DRG: 243 | End: 2022-01-01
Attending: INTERNAL MEDICINE
Payer: COMMERCIAL

## 2022-01-01 ENCOUNTER — APPOINTMENT (OUTPATIENT)
Dept: OCCUPATIONAL THERAPY | Facility: HOSPITAL | Age: 87
DRG: 243 | End: 2022-01-01
Attending: GENERAL ACUTE CARE HOSPITAL
Payer: COMMERCIAL

## 2022-01-01 ENCOUNTER — APPOINTMENT (OUTPATIENT)
Dept: OCCUPATIONAL THERAPY | Facility: HOSPITAL | Age: 87
DRG: 243 | End: 2022-01-01
Payer: COMMERCIAL

## 2022-01-01 ENCOUNTER — HOSPITAL ENCOUNTER (INPATIENT)
Facility: HOSPITAL | Age: 87
LOS: 7 days | Discharge: HOME | DRG: 243 | End: 2022-12-21
Attending: EMERGENCY MEDICINE | Admitting: INTERNAL MEDICINE
Payer: COMMERCIAL

## 2022-01-01 ENCOUNTER — APPOINTMENT (OUTPATIENT)
Dept: RADIOLOGY | Facility: HOSPITAL | Age: 87
DRG: 243 | End: 2022-01-01
Attending: INTERNAL MEDICINE
Payer: COMMERCIAL

## 2022-01-01 ENCOUNTER — APPOINTMENT (OUTPATIENT)
Dept: RADIOLOGY | Facility: HOSPITAL | Age: 87
DRG: 243 | End: 2022-01-01
Attending: GENERAL ACUTE CARE HOSPITAL
Payer: COMMERCIAL

## 2022-01-01 ENCOUNTER — PATIENT OUTREACH (OUTPATIENT)
Dept: CARE COORDINATION | Facility: CLINIC | Age: 87
End: 2022-01-01

## 2022-01-01 ENCOUNTER — ANCILLARY PROCEDURE (OUTPATIENT)
Dept: CARDIOLOGY | Facility: CLINIC | Age: 87
End: 2022-01-01
Attending: INTERNAL MEDICINE
Payer: COMMERCIAL

## 2022-01-01 ENCOUNTER — APPOINTMENT (OUTPATIENT)
Dept: PHYSICAL THERAPY | Facility: HOSPITAL | Age: 87
DRG: 243 | End: 2022-01-01
Attending: GENERAL ACUTE CARE HOSPITAL
Payer: COMMERCIAL

## 2022-01-01 VITALS
DIASTOLIC BLOOD PRESSURE: 65 MMHG | RESPIRATION RATE: 18 BRPM | HEIGHT: 73 IN | HEART RATE: 58 BPM | SYSTOLIC BLOOD PRESSURE: 130 MMHG | BODY MASS INDEX: 14.62 KG/M2 | WEIGHT: 110.3 LBS | OXYGEN SATURATION: 95 % | TEMPERATURE: 97.6 F

## 2022-01-01 DIAGNOSIS — Z95.0 CARDIAC PACEMAKER IN SITU: Primary | ICD-10-CM

## 2022-01-01 DIAGNOSIS — M62.81 GENERALIZED MUSCLE WEAKNESS: ICD-10-CM

## 2022-01-01 DIAGNOSIS — Z95.0 CARDIAC PACEMAKER IN SITU: ICD-10-CM

## 2022-01-01 DIAGNOSIS — I44.7 LEFT BUNDLE BRANCH BLOCK (LBBB): Primary | ICD-10-CM

## 2022-01-01 DIAGNOSIS — R19.7 DIARRHEA, UNSPECIFIED TYPE: ICD-10-CM

## 2022-01-01 DIAGNOSIS — F03.90: ICD-10-CM

## 2022-01-01 DIAGNOSIS — R00.1 SINUS BRADYCARDIA: ICD-10-CM

## 2022-01-01 DIAGNOSIS — I44.2 CHB (COMPLETE HEART BLOCK) (H): ICD-10-CM

## 2022-01-01 DIAGNOSIS — R52 PAIN: ICD-10-CM

## 2022-01-01 DIAGNOSIS — I50.22 CHRONIC HFREF (HEART FAILURE WITH REDUCED EJECTION FRACTION) (H): ICD-10-CM

## 2022-01-01 DIAGNOSIS — I44.2 COMPLETE HEART BLOCK (H): Primary | ICD-10-CM

## 2022-01-01 LAB
ANION GAP SERPL CALCULATED.3IONS-SCNC: 11 MMOL/L (ref 7–15)
ANION GAP SERPL CALCULATED.3IONS-SCNC: 11 MMOL/L (ref 7–15)
BASOPHILS # BLD AUTO: 0.1 10E3/UL (ref 0–0.2)
BASOPHILS NFR BLD AUTO: 1 %
BUN SERPL-MCNC: 27.3 MG/DL (ref 8–23)
BUN SERPL-MCNC: 32.7 MG/DL (ref 8–23)
CALCIUM SERPL-MCNC: 8.4 MG/DL (ref 8.2–9.6)
CALCIUM SERPL-MCNC: 8.8 MG/DL (ref 8.2–9.6)
CHLORIDE SERPL-SCNC: 104 MMOL/L (ref 98–107)
CHLORIDE SERPL-SCNC: 98 MMOL/L (ref 98–107)
CREAT SERPL-MCNC: 0.92 MG/DL (ref 0.67–1.17)
CREAT SERPL-MCNC: 0.94 MG/DL (ref 0.67–1.17)
CREAT SERPL-MCNC: 1.08 MG/DL (ref 0.67–1.17)
D DIMER PPP FEU-MCNC: 0.7 UG/ML FEU (ref 0–0.5)
DEPRECATED HCO3 PLAS-SCNC: 24 MMOL/L (ref 22–29)
DEPRECATED HCO3 PLAS-SCNC: 24 MMOL/L (ref 22–29)
DIGOXIN SERPL-MCNC: 0.6 NG/ML (ref 0.6–2)
EOSINOPHIL # BLD AUTO: 0.2 10E3/UL (ref 0–0.7)
EOSINOPHIL NFR BLD AUTO: 1 %
ERYTHROCYTE [DISTWIDTH] IN BLOOD BY AUTOMATED COUNT: 12.5 % (ref 10–15)
ERYTHROCYTE [DISTWIDTH] IN BLOOD BY AUTOMATED COUNT: 12.6 % (ref 10–15)
FLUAV RNA SPEC QL NAA+PROBE: NEGATIVE
FLUBV RNA RESP QL NAA+PROBE: NEGATIVE
GFR SERPL CREATININE-BSD FRML MDRD: 65 ML/MIN/1.73M2
GFR SERPL CREATININE-BSD FRML MDRD: 77 ML/MIN/1.73M2
GFR SERPL CREATININE-BSD FRML MDRD: 79 ML/MIN/1.73M2
GLUCOSE BLDC GLUCOMTR-MCNC: 96 MG/DL (ref 70–99)
GLUCOSE SERPL-MCNC: 104 MG/DL (ref 70–99)
GLUCOSE SERPL-MCNC: 65 MG/DL (ref 70–99)
HBA1C MFR BLD: 5.2 %
HCT VFR BLD AUTO: 35.3 % (ref 40–53)
HCT VFR BLD AUTO: 39.2 % (ref 40–53)
HGB BLD-MCNC: 11.3 G/DL (ref 13.3–17.7)
HGB BLD-MCNC: 12.8 G/DL (ref 13.3–17.7)
HOLD SPECIMEN: NORMAL
IMM GRANULOCYTES # BLD: 0.2 10E3/UL
IMM GRANULOCYTES NFR BLD: 2 %
LYMPHOCYTES # BLD AUTO: 1.3 10E3/UL (ref 0.8–5.3)
LYMPHOCYTES NFR BLD AUTO: 9 %
MAGNESIUM SERPL-MCNC: 1.8 MG/DL (ref 1.7–2.3)
MAGNESIUM SERPL-MCNC: 1.8 MG/DL (ref 1.7–2.3)
MAGNESIUM SERPL-MCNC: 1.9 MG/DL (ref 1.7–2.3)
MAGNESIUM SERPL-MCNC: 1.9 MG/DL (ref 1.7–2.3)
MAGNESIUM SERPL-MCNC: 2 MG/DL (ref 1.7–2.3)
MAGNESIUM SERPL-MCNC: 2.1 MG/DL (ref 1.7–2.3)
MAGNESIUM SERPL-MCNC: 2.1 MG/DL (ref 1.7–2.3)
MAGNESIUM SERPL-MCNC: 2.2 MG/DL (ref 1.7–2.3)
MCH RBC QN AUTO: 32.3 PG (ref 26.5–33)
MCH RBC QN AUTO: 32.6 PG (ref 26.5–33)
MCHC RBC AUTO-ENTMCNC: 32 G/DL (ref 31.5–36.5)
MCHC RBC AUTO-ENTMCNC: 32.7 G/DL (ref 31.5–36.5)
MCV RBC AUTO: 100 FL (ref 78–100)
MCV RBC AUTO: 101 FL (ref 78–100)
MDC_IDC_LEAD_IMPLANT_DT: NORMAL
MDC_IDC_LEAD_IMPLANT_DT: NORMAL
MDC_IDC_LEAD_LOCATION: NORMAL
MDC_IDC_LEAD_LOCATION: NORMAL
MDC_IDC_LEAD_LOCATION_DETAIL_1: NORMAL
MDC_IDC_LEAD_LOCATION_DETAIL_1: NORMAL
MDC_IDC_LEAD_MFG: NORMAL
MDC_IDC_LEAD_MFG: NORMAL
MDC_IDC_LEAD_MODEL: NORMAL
MDC_IDC_LEAD_MODEL: NORMAL
MDC_IDC_LEAD_POLARITY_TYPE: NORMAL
MDC_IDC_LEAD_POLARITY_TYPE: NORMAL
MDC_IDC_LEAD_SERIAL: NORMAL
MDC_IDC_LEAD_SERIAL: NORMAL
MDC_IDC_MSMT_BATTERY_DTM: NORMAL
MDC_IDC_MSMT_BATTERY_REMAINING_LONGEVITY: 174 MO
MDC_IDC_MSMT_BATTERY_REMAINING_PERCENTAGE: 100 %
MDC_IDC_MSMT_BATTERY_STATUS: NORMAL
MDC_IDC_MSMT_LEADCHNL_RA_IMPEDANCE_VALUE: 668 OHM
MDC_IDC_MSMT_LEADCHNL_RA_PACING_THRESHOLD_AMPLITUDE: 1.5 V
MDC_IDC_MSMT_LEADCHNL_RA_PACING_THRESHOLD_PULSEWIDTH: 0.4 MS
MDC_IDC_MSMT_LEADCHNL_RA_SENSING_INTR_AMPL: 6 MV
MDC_IDC_MSMT_LEADCHNL_RV_IMPEDANCE_VALUE: 637 OHM
MDC_IDC_MSMT_LEADCHNL_RV_PACING_THRESHOLD_AMPLITUDE: 0.7 V
MDC_IDC_MSMT_LEADCHNL_RV_PACING_THRESHOLD_AMPLITUDE: 0.7 V
MDC_IDC_MSMT_LEADCHNL_RV_PACING_THRESHOLD_PULSEWIDTH: 0.4 MS
MDC_IDC_MSMT_LEADCHNL_RV_PACING_THRESHOLD_PULSEWIDTH: 0.4 MS
MDC_IDC_MSMT_LEADCHNL_RV_SENSING_INTR_AMPL: 25 MV
MDC_IDC_PG_IMPLANT_DTM: NORMAL
MDC_IDC_PG_MFG: NORMAL
MDC_IDC_PG_MODEL: NORMAL
MDC_IDC_PG_SERIAL: NORMAL
MDC_IDC_PG_TYPE: NORMAL
MDC_IDC_SESS_CLINIC_NAME: NORMAL
MDC_IDC_SESS_DTM: NORMAL
MDC_IDC_SESS_TYPE: NORMAL
MDC_IDC_SET_BRADY_AT_MODE_SWITCH_MODE: NORMAL
MDC_IDC_SET_BRADY_AT_MODE_SWITCH_RATE: 160 {BEATS}/MIN
MDC_IDC_SET_BRADY_LOWRATE: 50 {BEATS}/MIN
MDC_IDC_SET_BRADY_MAX_SENSOR_RATE: 120 {BEATS}/MIN
MDC_IDC_SET_BRADY_MAX_TRACKING_RATE: 120 {BEATS}/MIN
MDC_IDC_SET_BRADY_MODE: NORMAL
MDC_IDC_SET_BRADY_PAV_DELAY_HIGH: 180 MS
MDC_IDC_SET_BRADY_PAV_DELAY_LOW: 200 MS
MDC_IDC_SET_BRADY_SAV_DELAY_HIGH: 135 MS
MDC_IDC_SET_BRADY_SAV_DELAY_LOW: 150 MS
MDC_IDC_SET_LEADCHNL_RA_PACING_AMPLITUDE: 3.5 V
MDC_IDC_SET_LEADCHNL_RA_PACING_CAPTURE_MODE: NORMAL
MDC_IDC_SET_LEADCHNL_RA_PACING_POLARITY: NORMAL
MDC_IDC_SET_LEADCHNL_RA_PACING_PULSEWIDTH: 0.4 MS
MDC_IDC_SET_LEADCHNL_RA_SENSING_ADAPTATION_MODE: NORMAL
MDC_IDC_SET_LEADCHNL_RA_SENSING_POLARITY: NORMAL
MDC_IDC_SET_LEADCHNL_RA_SENSING_SENSITIVITY: 0.25 MV
MDC_IDC_SET_LEADCHNL_RV_PACING_AMPLITUDE: NORMAL
MDC_IDC_SET_LEADCHNL_RV_PACING_CAPTURE_MODE: NORMAL
MDC_IDC_SET_LEADCHNL_RV_PACING_POLARITY: NORMAL
MDC_IDC_SET_LEADCHNL_RV_PACING_PULSEWIDTH: 0.4 MS
MDC_IDC_SET_LEADCHNL_RV_SENSING_ADAPTATION_MODE: NORMAL
MDC_IDC_SET_LEADCHNL_RV_SENSING_POLARITY: NORMAL
MDC_IDC_SET_LEADCHNL_RV_SENSING_SENSITIVITY: 1.5 MV
MDC_IDC_SET_ZONE_DETECTION_INTERVAL: 353 MS
MDC_IDC_SET_ZONE_TYPE: NORMAL
MDC_IDC_SET_ZONE_VENDOR_TYPE: NORMAL
MDC_IDC_STAT_AT_BURDEN_PERCENT: 0 %
MDC_IDC_STAT_AT_DTM_END: NORMAL
MDC_IDC_STAT_AT_DTM_START: NORMAL
MDC_IDC_STAT_AT_MODE_SW_COUNT: 0
MDC_IDC_STAT_BRADY_DTM_END: NORMAL
MDC_IDC_STAT_BRADY_DTM_START: NORMAL
MDC_IDC_STAT_BRADY_RA_PERCENT_PACED: 16 %
MDC_IDC_STAT_BRADY_RV_PERCENT_PACED: 100 %
MDC_IDC_STAT_EPISODE_RECENT_COUNT: 0
MDC_IDC_STAT_EPISODE_RECENT_COUNT_DTM_END: NORMAL
MDC_IDC_STAT_EPISODE_RECENT_COUNT_DTM_START: NORMAL
MDC_IDC_STAT_EPISODE_TYPE: NORMAL
MDC_IDC_STAT_EPISODE_VENDOR_TYPE: NORMAL
MONOCYTES # BLD AUTO: 0.6 10E3/UL (ref 0–1.3)
MONOCYTES NFR BLD AUTO: 4 %
NEUTROPHILS # BLD AUTO: 12.1 10E3/UL (ref 1.6–8.3)
NEUTROPHILS NFR BLD AUTO: 83 %
NRBC # BLD AUTO: 0 10E3/UL
NRBC BLD AUTO-RTO: 0 /100
NT-PROBNP SERPL-MCNC: 885 PG/ML (ref 0–1800)
PHOSPHATE SERPL-MCNC: 2.3 MG/DL (ref 2.5–4.5)
PHOSPHATE SERPL-MCNC: 2.7 MG/DL (ref 2.5–4.5)
PHOSPHATE SERPL-MCNC: 2.8 MG/DL (ref 2.5–4.5)
PHOSPHATE SERPL-MCNC: 2.8 MG/DL (ref 2.5–4.5)
PLATELET # BLD AUTO: 185 10E3/UL (ref 150–450)
PLATELET # BLD AUTO: 233 10E3/UL (ref 150–450)
PLATELET # BLD AUTO: 283 10E3/UL (ref 150–450)
POTASSIUM SERPL-SCNC: 4.2 MMOL/L (ref 3.4–5.3)
POTASSIUM SERPL-SCNC: 4.9 MMOL/L (ref 3.4–5.3)
RBC # BLD AUTO: 3.5 10E6/UL (ref 4.4–5.9)
RBC # BLD AUTO: 3.93 10E6/UL (ref 4.4–5.9)
RSV RNA SPEC NAA+PROBE: NEGATIVE
SARS-COV-2 RNA RESP QL NAA+PROBE: NEGATIVE
SODIUM SERPL-SCNC: 133 MMOL/L (ref 136–145)
SODIUM SERPL-SCNC: 139 MMOL/L (ref 136–145)
TROPONIN T SERPL HS-MCNC: 56 NG/L
TROPONIN T SERPL HS-MCNC: 62 NG/L
TROPONIN T SERPL HS-MCNC: 62 NG/L
TROPONIN T SERPL HS-MCNC: 63 NG/L
WBC # BLD AUTO: 14.1 10E3/UL (ref 4–11)
WBC # BLD AUTO: 9.8 10E3/UL (ref 4–11)

## 2022-01-01 PROCEDURE — 36415 COLL VENOUS BLD VENIPUNCTURE: CPT | Performed by: INTERNAL MEDICINE

## 2022-01-01 PROCEDURE — 83735 ASSAY OF MAGNESIUM: CPT | Performed by: INTERNAL MEDICINE

## 2022-01-01 PROCEDURE — 250N000013 HC RX MED GY IP 250 OP 250 PS 637: Performed by: INTERNAL MEDICINE

## 2022-01-01 PROCEDURE — 97116 GAIT TRAINING THERAPY: CPT | Mod: GP

## 2022-01-01 PROCEDURE — 255N000002 HC RX 255 OP 636: Performed by: INTERNAL MEDICINE

## 2022-01-01 PROCEDURE — 250N000011 HC RX IP 250 OP 636: Performed by: INTERNAL MEDICINE

## 2022-01-01 PROCEDURE — 99233 SBSQ HOSP IP/OBS HIGH 50: CPT | Performed by: INTERNAL MEDICINE

## 2022-01-01 PROCEDURE — 97535 SELF CARE MNGMENT TRAINING: CPT | Mod: GO

## 2022-01-01 PROCEDURE — 84100 ASSAY OF PHOSPHORUS: CPT | Performed by: INTERNAL MEDICINE

## 2022-01-01 PROCEDURE — 96360 HYDRATION IV INFUSION INIT: CPT

## 2022-01-01 PROCEDURE — 250N000013 HC RX MED GY IP 250 OP 250 PS 637: Performed by: GENERAL ACUTE CARE HOSPITAL

## 2022-01-01 PROCEDURE — 97165 OT EVAL LOW COMPLEX 30 MIN: CPT | Mod: GO

## 2022-01-01 PROCEDURE — 93005 ELECTROCARDIOGRAM TRACING: CPT | Performed by: EMERGENCY MEDICINE

## 2022-01-01 PROCEDURE — 33208 INSRT HEART PM ATRIAL & VENT: CPT | Performed by: INTERNAL MEDICINE

## 2022-01-01 PROCEDURE — 93280 PM DEVICE PROGR EVAL DUAL: CPT | Performed by: INTERNAL MEDICINE

## 2022-01-01 PROCEDURE — 99222 1ST HOSP IP/OBS MODERATE 55: CPT | Performed by: INTERNAL MEDICINE

## 2022-01-01 PROCEDURE — 0JH606Z INSERTION OF PACEMAKER, DUAL CHAMBER INTO CHEST SUBCUTANEOUS TISSUE AND FASCIA, OPEN APPROACH: ICD-10-PCS | Performed by: INTERNAL MEDICINE

## 2022-01-01 PROCEDURE — 99232 SBSQ HOSP IP/OBS MODERATE 35: CPT | Performed by: INTERNAL MEDICINE

## 2022-01-01 PROCEDURE — 97110 THERAPEUTIC EXERCISES: CPT | Mod: GP

## 2022-01-01 PROCEDURE — 93306 TTE W/DOPPLER COMPLETE: CPT | Mod: 26 | Performed by: INTERNAL MEDICINE

## 2022-01-01 PROCEDURE — 99152 MOD SED SAME PHYS/QHP 5/>YRS: CPT | Performed by: INTERNAL MEDICINE

## 2022-01-01 PROCEDURE — C1894 INTRO/SHEATH, NON-LASER: HCPCS | Performed by: INTERNAL MEDICINE

## 2022-01-01 PROCEDURE — 99153 MOD SED SAME PHYS/QHP EA: CPT | Performed by: INTERNAL MEDICINE

## 2022-01-01 PROCEDURE — 82565 ASSAY OF CREATININE: CPT | Performed by: INTERNAL MEDICINE

## 2022-01-01 PROCEDURE — 85004 AUTOMATED DIFF WBC COUNT: CPT | Performed by: EMERGENCY MEDICINE

## 2022-01-01 PROCEDURE — C9803 HOPD COVID-19 SPEC COLLECT: HCPCS

## 2022-01-01 PROCEDURE — 85379 FIBRIN DEGRADATION QUANT: CPT | Performed by: EMERGENCY MEDICINE

## 2022-01-01 PROCEDURE — 258N000003 HC RX IP 258 OP 636: Performed by: EMERGENCY MEDICINE

## 2022-01-01 PROCEDURE — 85027 COMPLETE CBC AUTOMATED: CPT | Performed by: INTERNAL MEDICINE

## 2022-01-01 PROCEDURE — 83036 HEMOGLOBIN GLYCOSYLATED A1C: CPT | Performed by: INTERNAL MEDICINE

## 2022-01-01 PROCEDURE — 80048 BASIC METABOLIC PNL TOTAL CA: CPT | Performed by: INTERNAL MEDICINE

## 2022-01-01 PROCEDURE — 250N000011 HC RX IP 250 OP 636: Performed by: NURSE PRACTITIONER

## 2022-01-01 PROCEDURE — 210N000001 HC R&B IMCU HEART CARE

## 2022-01-01 PROCEDURE — 84484 ASSAY OF TROPONIN QUANT: CPT | Performed by: INTERNAL MEDICINE

## 2022-01-01 PROCEDURE — 97110 THERAPEUTIC EXERCISES: CPT | Mod: GO

## 2022-01-01 PROCEDURE — 80048 BASIC METABOLIC PNL TOTAL CA: CPT | Performed by: EMERGENCY MEDICINE

## 2022-01-01 PROCEDURE — 36415 COLL VENOUS BLD VENIPUNCTURE: CPT | Performed by: EMERGENCY MEDICINE

## 2022-01-01 PROCEDURE — 99232 SBSQ HOSP IP/OBS MODERATE 35: CPT | Mod: 57 | Performed by: GENERAL ACUTE CARE HOSPITAL

## 2022-01-01 PROCEDURE — 99285 EMERGENCY DEPT VISIT HI MDM: CPT | Mod: 25

## 2022-01-01 PROCEDURE — 84484 ASSAY OF TROPONIN QUANT: CPT | Performed by: EMERGENCY MEDICINE

## 2022-01-01 PROCEDURE — 250N000011 HC RX IP 250 OP 636: Performed by: HOSPITALIST

## 2022-01-01 PROCEDURE — 99223 1ST HOSP IP/OBS HIGH 75: CPT | Mod: 25 | Performed by: GENERAL ACUTE CARE HOSPITAL

## 2022-01-01 PROCEDURE — 02HK3JZ INSERTION OF PACEMAKER LEAD INTO RIGHT VENTRICLE, PERCUTANEOUS APPROACH: ICD-10-PCS | Performed by: INTERNAL MEDICINE

## 2022-01-01 PROCEDURE — C1898 LEAD, PMKR, OTHER THAN TRANS: HCPCS | Performed by: INTERNAL MEDICINE

## 2022-01-01 PROCEDURE — 02H63JZ INSERTION OF PACEMAKER LEAD INTO RIGHT ATRIUM, PERCUTANEOUS APPROACH: ICD-10-PCS | Performed by: INTERNAL MEDICINE

## 2022-01-01 PROCEDURE — 33208 INSRT HEART PM ATRIAL & VENT: CPT | Mod: KX | Performed by: INTERNAL MEDICINE

## 2022-01-01 PROCEDURE — 71045 X-RAY EXAM CHEST 1 VIEW: CPT

## 2022-01-01 PROCEDURE — 97161 PT EVAL LOW COMPLEX 20 MIN: CPT | Mod: GP

## 2022-01-01 PROCEDURE — 999N000065 XR CHEST PORT 1 VIEW

## 2022-01-01 PROCEDURE — 80162 ASSAY OF DIGOXIN TOTAL: CPT | Performed by: EMERGENCY MEDICINE

## 2022-01-01 PROCEDURE — 272N000001 HC OR GENERAL SUPPLY STERILE: Performed by: INTERNAL MEDICINE

## 2022-01-01 PROCEDURE — C1785 PMKR, DUAL, RATE-RESP: HCPCS | Performed by: INTERNAL MEDICINE

## 2022-01-01 PROCEDURE — 85049 AUTOMATED PLATELET COUNT: CPT | Performed by: INTERNAL MEDICINE

## 2022-01-01 PROCEDURE — 99222 1ST HOSP IP/OBS MODERATE 55: CPT | Performed by: PHYSICIAN ASSISTANT

## 2022-01-01 PROCEDURE — 83880 ASSAY OF NATRIURETIC PEPTIDE: CPT | Performed by: EMERGENCY MEDICINE

## 2022-01-01 PROCEDURE — 258N000003 HC RX IP 258 OP 636: Performed by: INTERNAL MEDICINE

## 2022-01-01 PROCEDURE — 99232 SBSQ HOSP IP/OBS MODERATE 35: CPT | Mod: 24 | Performed by: GENERAL ACUTE CARE HOSPITAL

## 2022-01-01 PROCEDURE — 93306 TTE W/DOPPLER COMPLETE: CPT

## 2022-01-01 PROCEDURE — 99239 HOSP IP/OBS DSCHRG MGMT >30: CPT | Performed by: INTERNAL MEDICINE

## 2022-01-01 PROCEDURE — 87637 SARSCOV2&INF A&B&RSV AMP PRB: CPT | Performed by: EMERGENCY MEDICINE

## 2022-01-01 DEVICE — LEAD INGEVITY+ AF IS1 7841 52CM: Type: IMPLANTABLE DEVICE | Site: HEART | Status: FUNCTIONAL

## 2022-01-01 DEVICE — PCMKR CARD ACCOLADE MRI EL DR: Type: IMPLANTABLE DEVICE | Site: CHEST  WALL | Status: FUNCTIONAL

## 2022-01-01 DEVICE — LEAD INGEVITY+ AF IS1 7840 4CM: Type: IMPLANTABLE DEVICE | Site: HEART | Status: FUNCTIONAL

## 2022-01-01 RX ORDER — METOPROLOL SUCCINATE 25 MG/1
25 TABLET, EXTENDED RELEASE ORAL DAILY
Status: DISCONTINUED | OUTPATIENT
Start: 2022-01-01 | End: 2022-01-01 | Stop reason: HOSPADM

## 2022-01-01 RX ORDER — IPRATROPIUM BROMIDE AND ALBUTEROL SULFATE 2.5; .5 MG/3ML; MG/3ML
3 SOLUTION RESPIRATORY (INHALATION)
Status: DISCONTINUED | OUTPATIENT
Start: 2022-01-01 | End: 2022-01-01

## 2022-01-01 RX ORDER — CEFAZOLIN SODIUM 2 G/100ML
2 INJECTION, SOLUTION INTRAVENOUS
Status: DISCONTINUED | OUTPATIENT
Start: 2022-01-01 | End: 2022-01-01 | Stop reason: HOSPADM

## 2022-01-01 RX ORDER — CEFAZOLIN SODIUM/WATER 2 G/20 ML
2 SYRINGE (ML) INTRAVENOUS
Status: COMPLETED | OUTPATIENT
Start: 2022-01-01 | End: 2022-01-01

## 2022-01-01 RX ORDER — SODIUM CHLORIDE 9 MG/ML
100 INJECTION, SOLUTION INTRAVENOUS CONTINUOUS
Status: DISCONTINUED | OUTPATIENT
Start: 2022-01-01 | End: 2022-01-01 | Stop reason: HOSPADM

## 2022-01-01 RX ORDER — DEXMEDETOMIDINE HYDROCHLORIDE 4 UG/ML
.1-1.5 INJECTION, SOLUTION INTRAVENOUS CONTINUOUS
Status: DISCONTINUED | OUTPATIENT
Start: 2022-01-01 | End: 2022-01-01 | Stop reason: HOSPADM

## 2022-01-01 RX ORDER — LISINOPRIL 5 MG/1
5 TABLET ORAL AT BEDTIME
Status: DISCONTINUED | OUTPATIENT
Start: 2022-01-01 | End: 2022-01-01 | Stop reason: HOSPADM

## 2022-01-01 RX ORDER — IPRATROPIUM BROMIDE AND ALBUTEROL SULFATE 2.5; .5 MG/3ML; MG/3ML
3 SOLUTION RESPIRATORY (INHALATION) EVERY 4 HOURS PRN
Status: DISCONTINUED | OUTPATIENT
Start: 2022-01-01 | End: 2022-01-01 | Stop reason: HOSPADM

## 2022-01-01 RX ORDER — CEFAZOLIN SODIUM 1 G/3ML
1 INJECTION, POWDER, FOR SOLUTION INTRAMUSCULAR; INTRAVENOUS EVERY 8 HOURS
Status: COMPLETED | OUTPATIENT
Start: 2022-01-01 | End: 2022-01-01

## 2022-01-01 RX ORDER — NICOTINE POLACRILEX 4 MG
15-30 LOZENGE BUCCAL
Status: DISCONTINUED | OUTPATIENT
Start: 2022-01-01 | End: 2022-01-01 | Stop reason: HOSPADM

## 2022-01-01 RX ORDER — SODIUM CHLORIDE 9 MG/ML
INJECTION, SOLUTION INTRAVENOUS CONTINUOUS
Status: DISCONTINUED | OUTPATIENT
Start: 2022-01-01 | End: 2022-01-01

## 2022-01-01 RX ORDER — ATROPINE SULFATE 0.1 MG/ML
0.5 INJECTION INTRAVENOUS ONCE
Status: COMPLETED | OUTPATIENT
Start: 2022-01-01 | End: 2022-01-01

## 2022-01-01 RX ORDER — FENTANYL CITRATE 50 UG/ML
25 INJECTION, SOLUTION INTRAMUSCULAR; INTRAVENOUS
Status: DISCONTINUED | OUTPATIENT
Start: 2022-01-01 | End: 2022-01-01 | Stop reason: HOSPADM

## 2022-01-01 RX ORDER — LIDOCAINE 40 MG/G
CREAM TOPICAL
Status: DISCONTINUED | OUTPATIENT
Start: 2022-01-01 | End: 2022-01-01 | Stop reason: HOSPADM

## 2022-01-01 RX ORDER — FENTANYL CITRATE 50 UG/ML
INJECTION, SOLUTION INTRAMUSCULAR; INTRAVENOUS
Status: DISCONTINUED | OUTPATIENT
Start: 2022-01-01 | End: 2022-01-01 | Stop reason: HOSPADM

## 2022-01-01 RX ORDER — ACETAMINOPHEN 325 MG/1
650 TABLET ORAL EVERY 4 HOURS PRN
Status: DISCONTINUED | OUTPATIENT
Start: 2022-01-01 | End: 2022-01-01 | Stop reason: HOSPADM

## 2022-01-01 RX ORDER — ENOXAPARIN SODIUM 100 MG/ML
40 INJECTION SUBCUTANEOUS EVERY 24 HOURS
Status: DISCONTINUED | OUTPATIENT
Start: 2022-01-01 | End: 2022-01-01

## 2022-01-01 RX ORDER — ACETAMINOPHEN 325 MG/1
650 TABLET ORAL EVERY 4 HOURS PRN
Refills: 0
Start: 2022-01-01

## 2022-01-01 RX ORDER — DEXTROSE MONOHYDRATE 25 G/50ML
25-50 INJECTION, SOLUTION INTRAVENOUS
Status: DISCONTINUED | OUTPATIENT
Start: 2022-01-01 | End: 2022-01-01 | Stop reason: HOSPADM

## 2022-01-01 RX ORDER — IODIXANOL 320 MG/ML
INJECTION, SOLUTION INTRAVASCULAR
Status: DISCONTINUED | OUTPATIENT
Start: 2022-01-01 | End: 2022-01-01 | Stop reason: HOSPADM

## 2022-01-01 RX ORDER — METOPROLOL SUCCINATE 25 MG/1
25 TABLET, EXTENDED RELEASE ORAL DAILY
Qty: 30 TABLET | Refills: 3 | Status: SHIPPED | OUTPATIENT
Start: 2022-01-01

## 2022-01-01 RX ORDER — FLUTICASONE FUROATE AND VILANTEROL 100; 25 UG/1; UG/1
1 POWDER RESPIRATORY (INHALATION) DAILY
Status: DISCONTINUED | OUTPATIENT
Start: 2022-01-01 | End: 2022-01-01 | Stop reason: HOSPADM

## 2022-01-01 RX ADMIN — LISINOPRIL 5 MG: 5 TABLET ORAL at 00:21

## 2022-01-01 RX ADMIN — SODIUM CHLORIDE: 9 INJECTION, SOLUTION INTRAVENOUS at 22:49

## 2022-01-01 RX ADMIN — CEFAZOLIN 1 G: 1 INJECTION, POWDER, FOR SOLUTION INTRAMUSCULAR; INTRAVENOUS at 15:43

## 2022-01-01 RX ADMIN — LISINOPRIL 5 MG: 5 TABLET ORAL at 22:35

## 2022-01-01 RX ADMIN — ATROPINE SULFATE 0.5 MG: 0.1 INJECTION, SOLUTION ENDOTRACHEAL; INTRAMUSCULAR; INTRAVENOUS; SUBCUTANEOUS at 00:17

## 2022-01-01 RX ADMIN — CEFAZOLIN 1 G: 1 INJECTION, POWDER, FOR SOLUTION INTRAMUSCULAR; INTRAVENOUS at 00:03

## 2022-01-01 RX ADMIN — ENOXAPARIN SODIUM 40 MG: 40 INJECTION SUBCUTANEOUS at 08:11

## 2022-01-01 RX ADMIN — METOPROLOL SUCCINATE 25 MG: 25 TABLET, EXTENDED RELEASE ORAL at 08:59

## 2022-01-01 RX ADMIN — LISINOPRIL 5 MG: 5 TABLET ORAL at 21:10

## 2022-01-01 RX ADMIN — FLUTICASONE FUROATE AND VILANTEROL TRIFENATATE 1 PUFF: 100; 25 POWDER RESPIRATORY (INHALATION) at 08:37

## 2022-01-01 RX ADMIN — FLUTICASONE FUROATE AND VILANTEROL TRIFENATATE 1 PUFF: 100; 25 POWDER RESPIRATORY (INHALATION) at 08:11

## 2022-01-01 RX ADMIN — METOPROLOL SUCCINATE 25 MG: 25 TABLET, EXTENDED RELEASE ORAL at 13:43

## 2022-01-01 RX ADMIN — LISINOPRIL 5 MG: 5 TABLET ORAL at 22:16

## 2022-01-01 RX ADMIN — Medication 1000 MG: at 17:09

## 2022-01-01 RX ADMIN — FLUTICASONE FUROATE AND VILANTEROL TRIFENATATE 1 PUFF: 100; 25 POWDER RESPIRATORY (INHALATION) at 08:02

## 2022-01-01 RX ADMIN — METOPROLOL SUCCINATE 25 MG: 25 TABLET, EXTENDED RELEASE ORAL at 08:02

## 2022-01-01 RX ADMIN — Medication 2 G: at 16:08

## 2022-01-01 RX ADMIN — SODIUM CHLORIDE 500 ML: 9 INJECTION, SOLUTION INTRAVENOUS at 15:04

## 2022-01-01 RX ADMIN — LISINOPRIL 5 MG: 5 TABLET ORAL at 22:46

## 2022-01-01 RX ADMIN — FLUTICASONE FUROATE AND VILANTEROL TRIFENATATE 1 PUFF: 100; 25 POWDER RESPIRATORY (INHALATION) at 10:17

## 2022-01-01 RX ADMIN — METOPROLOL SUCCINATE 25 MG: 25 TABLET, EXTENDED RELEASE ORAL at 08:37

## 2022-01-01 RX ADMIN — FLUTICASONE FUROATE AND VILANTEROL TRIFENATATE 1 PUFF: 100; 25 POWDER RESPIRATORY (INHALATION) at 08:59

## 2022-01-01 RX ADMIN — FLUTICASONE FUROATE AND VILANTEROL TRIFENATATE 1 PUFF: 100; 25 POWDER RESPIRATORY (INHALATION) at 09:09

## 2022-01-01 RX ADMIN — CEFAZOLIN 1 G: 1 INJECTION, POWDER, FOR SOLUTION INTRAMUSCULAR; INTRAVENOUS at 09:09

## 2022-01-01 RX ADMIN — LISINOPRIL 5 MG: 5 TABLET ORAL at 20:28

## 2022-01-01 RX ADMIN — METOPROLOL SUCCINATE 25 MG: 25 TABLET, EXTENDED RELEASE ORAL at 08:25

## 2022-01-01 RX ADMIN — LISINOPRIL 5 MG: 5 TABLET ORAL at 21:27

## 2022-01-01 RX ADMIN — FLUTICASONE FUROATE AND VILANTEROL TRIFENATATE 1 PUFF: 100; 25 POWDER RESPIRATORY (INHALATION) at 08:25

## 2022-01-01 ASSESSMENT — ACTIVITIES OF DAILY LIVING (ADL)
ADLS_ACUITY_SCORE: 46
ADLS_ACUITY_SCORE: 46
ADLS_ACUITY_SCORE: 51
ADLS_ACUITY_SCORE: 46
ADLS_ACUITY_SCORE: 46
ADLS_ACUITY_SCORE: 51
TRANSFERRING: 0-->ASSISTANCE NEEDED (DEVELOPMENTALLY APPROPRIATE)
ADLS_ACUITY_SCORE: 37
DRESSING/BATHING_DIFFICULTY: YES
DOING_ERRANDS_INDEPENDENTLY_DIFFICULTY: YES
DEPENDENT_IADLS:: CLEANING;COOKING;LAUNDRY;SHOPPING;MEAL PREPARATION;MEDICATION MANAGEMENT;MONEY MANAGEMENT;TRANSPORTATION;INCONTINENCE
ADLS_ACUITY_SCORE: 46
ADLS_ACUITY_SCORE: 46
ADLS_ACUITY_SCORE: 37
ADLS_ACUITY_SCORE: 37
ADLS_ACUITY_SCORE: 47
ADLS_ACUITY_SCORE: 51
ADLS_ACUITY_SCORE: 51
TRANSFERRING: 1-->ASSISTANCE (EQUIPMENT/PERSON) NEEDED
FALL_HISTORY_WITHIN_LAST_SIX_MONTHS: YES
ADLS_ACUITY_SCORE: 50
ADLS_ACUITY_SCORE: 46
ADLS_ACUITY_SCORE: 37
ADLS_ACUITY_SCORE: 37
ADLS_ACUITY_SCORE: 51
ADLS_ACUITY_SCORE: 46
ADLS_ACUITY_SCORE: 37
ADLS_ACUITY_SCORE: 51
ADLS_ACUITY_SCORE: 50
ADLS_ACUITY_SCORE: 51
ADLS_ACUITY_SCORE: 47
TOILETING: 1-->ASSISTANCE (EQUIPMENT/PERSON) NEEDED
ADLS_ACUITY_SCORE: 46
ADLS_ACUITY_SCORE: 46
ADLS_ACUITY_SCORE: 51
BATHING: 1-->ASSISTANCE NEEDED
TOILETING_ASSISTANCE: TOILETING DIFFICULTY, ASSISTANCE 1 PERSON
ADLS_ACUITY_SCORE: 46
TOILETING_ISSUES: YES
WEAR_GLASSES_OR_BLIND: YES
ADLS_ACUITY_SCORE: 51
ADLS_ACUITY_SCORE: 46
ADLS_ACUITY_SCORE: 46
ADLS_ACUITY_SCORE: 51
DRESS: 0-->ASSISTANCE NEEDED (DEVELOPMENTALLY APPROPRIATE)
ADLS_ACUITY_SCORE: 51
DRESSING/BATHING: BATHING DIFFICULTY, ASSISTANCE 1 PERSON
ADLS_ACUITY_SCORE: 37
ADLS_ACUITY_SCORE: 51
ADLS_ACUITY_SCORE: 37
DIFFICULTY_EATING/SWALLOWING: NO
ADLS_ACUITY_SCORE: 37
ADLS_ACUITY_SCORE: 46
TOILETING: 1-->ASSISTANCE (EQUIPMENT/PERSON) NEEDED (NOT DEVELOPMENTALLY APPROPRIATE)
ADLS_ACUITY_SCORE: 46
ADLS_ACUITY_SCORE: 37
ADLS_ACUITY_SCORE: 51
ADLS_ACUITY_SCORE: 37
CONCENTRATING,_REMEMBERING_OR_MAKING_DECISIONS_DIFFICULTY: YES
ADLS_ACUITY_SCORE: 46
WALKING_OR_CLIMBING_STAIRS: AMBULATION DIFFICULTY, ASSISTANCE 1 PERSON
ADLS_ACUITY_SCORE: 37
ADLS_ACUITY_SCORE: 46
ADLS_ACUITY_SCORE: 50
ADLS_ACUITY_SCORE: 51
ADLS_ACUITY_SCORE: 37
ADLS_ACUITY_SCORE: 51
ADLS_ACUITY_SCORE: 51
NUMBER_OF_TIMES_PATIENT_HAS_FALLEN_WITHIN_LAST_SIX_MONTHS: 3
ADLS_ACUITY_SCORE: 51
ADLS_ACUITY_SCORE: 51
ADLS_ACUITY_SCORE: 46
ADLS_ACUITY_SCORE: 51
ADLS_ACUITY_SCORE: 46
ADLS_ACUITY_SCORE: 51
ADLS_ACUITY_SCORE: 46
ADLS_ACUITY_SCORE: 46
DRESS: 1-->ASSISTANCE (EQUIPMENT/PERSON) NEEDED
ADLS_ACUITY_SCORE: 46
ADLS_ACUITY_SCORE: 46
ADLS_ACUITY_SCORE: 51
ADLS_ACUITY_SCORE: 37
ADLS_ACUITY_SCORE: 51
ADLS_ACUITY_SCORE: 50
ADLS_ACUITY_SCORE: 51
ADLS_ACUITY_SCORE: 46
ADLS_ACUITY_SCORE: 51
ADLS_ACUITY_SCORE: 51
ADLS_ACUITY_SCORE: 46
WALKING_OR_CLIMBING_STAIRS_DIFFICULTY: YES
ADLS_ACUITY_SCORE: 51
ADLS_ACUITY_SCORE: 51
ADLS_ACUITY_SCORE: 50
CHANGE_IN_FUNCTIONAL_STATUS_SINCE_ONSET_OF_CURRENT_ILLNESS/INJURY: NO
ADLS_ACUITY_SCORE: 50
ADLS_ACUITY_SCORE: 50
ADLS_ACUITY_SCORE: 51
ADLS_ACUITY_SCORE: 46
ADLS_ACUITY_SCORE: 46

## 2022-01-01 ASSESSMENT — ENCOUNTER SYMPTOMS
BLOOD IN STOOL: 0
DIARRHEA: 1
NAUSEA: 0
VOMITING: 0
WEAKNESS: 1

## 2022-01-09 ENCOUNTER — NURSE TRIAGE (OUTPATIENT)
Dept: NURSING | Facility: CLINIC | Age: 87
End: 2022-01-09

## 2022-01-09 ENCOUNTER — HOSPITAL ENCOUNTER (EMERGENCY)
Facility: HOSPITAL | Age: 87
Discharge: HOME | End: 2022-01-09
Attending: EMERGENCY MEDICINE | Admitting: EMERGENCY MEDICINE
Payer: COMMERCIAL

## 2022-01-09 VITALS
HEART RATE: 60 BPM | RESPIRATION RATE: 25 BRPM | TEMPERATURE: 97.4 F | BODY MASS INDEX: 22.4 KG/M2 | OXYGEN SATURATION: 96 % | SYSTOLIC BLOOD PRESSURE: 133 MMHG | DIASTOLIC BLOOD PRESSURE: 74 MMHG | HEIGHT: 67 IN

## 2022-01-09 DIAGNOSIS — R19.7 DIARRHEA, UNSPECIFIED TYPE: ICD-10-CM

## 2022-01-09 DIAGNOSIS — E86.0 DEHYDRATION: ICD-10-CM

## 2022-01-09 PROBLEM — F03.90: Status: ACTIVE | Noted: 2021-01-12

## 2022-01-09 PROBLEM — I50.22 CHRONIC HFREF (HEART FAILURE WITH REDUCED EJECTION FRACTION) (H): Status: ACTIVE | Noted: 2019-05-03

## 2022-01-09 LAB
ALBUMIN SERPL-MCNC: 3.5 G/DL (ref 3.5–5)
ALP SERPL-CCNC: 64 U/L (ref 45–120)
ALT SERPL W P-5'-P-CCNC: <9 U/L (ref 0–45)
ANION GAP SERPL CALCULATED.3IONS-SCNC: 9 MMOL/L (ref 5–18)
AST SERPL W P-5'-P-CCNC: 17 U/L (ref 0–40)
BASOPHILS # BLD AUTO: 0 10E3/UL (ref 0–0.2)
BASOPHILS NFR BLD AUTO: 0 %
BILIRUB SERPL-MCNC: 0.8 MG/DL (ref 0–1)
BUN SERPL-MCNC: 33 MG/DL (ref 8–28)
CALCIUM SERPL-MCNC: 9.2 MG/DL (ref 8.5–10.5)
CHLORIDE BLD-SCNC: 103 MMOL/L (ref 98–107)
CO2 SERPL-SCNC: 26 MMOL/L (ref 22–31)
CREAT SERPL-MCNC: 1.05 MG/DL (ref 0.7–1.3)
EOSINOPHIL # BLD AUTO: 0 10E3/UL (ref 0–0.7)
EOSINOPHIL NFR BLD AUTO: 0 %
ERYTHROCYTE [DISTWIDTH] IN BLOOD BY AUTOMATED COUNT: 13.2 % (ref 10–15)
GFR SERPL CREATININE-BSD FRML MDRD: 68 ML/MIN/1.73M2
GLUCOSE BLD-MCNC: 84 MG/DL (ref 70–125)
HCT VFR BLD AUTO: 38.4 % (ref 40–53)
HGB BLD-MCNC: 12.6 G/DL (ref 13.3–17.7)
IMM GRANULOCYTES # BLD: 0.1 10E3/UL
IMM GRANULOCYTES NFR BLD: 1 %
LYMPHOCYTES # BLD AUTO: 1.9 10E3/UL (ref 0.8–5.3)
LYMPHOCYTES NFR BLD AUTO: 14 %
MAGNESIUM SERPL-MCNC: 2 MG/DL (ref 1.8–2.6)
MCH RBC QN AUTO: 32.8 PG (ref 26.5–33)
MCHC RBC AUTO-ENTMCNC: 32.8 G/DL (ref 31.5–36.5)
MCV RBC AUTO: 100 FL (ref 78–100)
MONOCYTES # BLD AUTO: 1.2 10E3/UL (ref 0–1.3)
MONOCYTES NFR BLD AUTO: 9 %
NEUTROPHILS # BLD AUTO: 9.9 10E3/UL (ref 1.6–8.3)
NEUTROPHILS NFR BLD AUTO: 76 %
NRBC # BLD AUTO: 0 10E3/UL
NRBC BLD AUTO-RTO: 0 /100
PLATELET # BLD AUTO: 219 10E3/UL (ref 150–450)
POTASSIUM BLD-SCNC: 5 MMOL/L (ref 3.5–5)
PROT SERPL-MCNC: 6.4 G/DL (ref 6–8)
RBC # BLD AUTO: 3.84 10E6/UL (ref 4.4–5.9)
SODIUM SERPL-SCNC: 138 MMOL/L (ref 136–145)
WBC # BLD AUTO: 12.9 10E3/UL (ref 4–11)

## 2022-01-09 PROCEDURE — 80053 COMPREHEN METABOLIC PANEL: CPT | Performed by: EMERGENCY MEDICINE

## 2022-01-09 PROCEDURE — 96361 HYDRATE IV INFUSION ADD-ON: CPT

## 2022-01-09 PROCEDURE — 83735 ASSAY OF MAGNESIUM: CPT | Performed by: EMERGENCY MEDICINE

## 2022-01-09 PROCEDURE — 96360 HYDRATION IV INFUSION INIT: CPT

## 2022-01-09 PROCEDURE — 99283 EMERGENCY DEPT VISIT LOW MDM: CPT | Mod: 25

## 2022-01-09 PROCEDURE — 258N000003 HC RX IP 258 OP 636: Performed by: EMERGENCY MEDICINE

## 2022-01-09 PROCEDURE — 85025 COMPLETE CBC W/AUTO DIFF WBC: CPT | Performed by: EMERGENCY MEDICINE

## 2022-01-09 PROCEDURE — 36415 COLL VENOUS BLD VENIPUNCTURE: CPT | Performed by: EMERGENCY MEDICINE

## 2022-01-09 RX ADMIN — SODIUM CHLORIDE 1000 ML: 9 INJECTION, SOLUTION INTRAVENOUS at 17:49

## 2022-01-09 NOTE — TELEPHONE ENCOUNTER
"PCP: [facility] Dr ROMA Christian . Fully vaccinated for COVID. Homebound. Wife states her  has had diarrhea for a couple of days. It began small and now he has \"frequent gushing loose stools\". Hx of Alzheimer's disease, she states he is confused towards evening x the last 6 months. \"He does not do much, mostly sits and watches TV or sleeps. He is refusing to eat or drink since yesterday, and he did not eat much yesterday\". He last urinated 11:30am. \"He takes pills for that\".  Triaged to a disposition of Go to ED now or PCP triage. Wife does not want to take him to the ER unless necessary. Advised to contact  CareLine and request to speak to oncall provider (for 2nd level triage), which she agrees to do.    Josseline Phillips RN Triage Nurse Advisor 3:14 PM 1/9/2022  Reason for Disposition    Patient sounds very sick or weak to the triager    [1] SEVERE diarrhea (e.g., 7 or more times / day more than normal) AND [2] age > 60 years    Additional Information    Negative: Shock suspected (e.g., cold/pale/clammy skin, too weak to stand, low BP, rapid pulse)    Negative: Difficult to awaken or acting confused (e.g., disoriented, slurred speech)    Negative: Sounds like a life-threatening emergency to the triager    Negative: Vomiting also present and worse than the diarrhea    Negative: [1] Blood in stool AND [2] without diarrhea    Negative: Diarrhea in a cancer patient who is currently (or recently) receiving chemotherapy or radiation therapy, or cancer patient who has metastatic or end-stage cancer and is receiving palliative care    Negative: [1] SEVERE abdominal pain (e.g., excruciating) AND [2] present > 1 hour    Negative: [1] SEVERE abdominal pain AND [2] age > 60    Negative: [1] Blood in the stool AND [2] moderate or large amount of blood    Negative: Black or tarry bowel movements  (Exception: chronic-unchanged  black-grey bowel movements AND is taking iron pills or Pepto-bismol)    Negative: [1] Drinking very " little AND [2] dehydration suspected (e.g., no urine > 12 hours, very dry mouth, very lightheaded)    Protocols used: DIARRHEA-A-AH  COVID 19 Nurse Triage Plan/Patient Instructions    Please be aware that novel coronavirus (COVID-19) may be circulating in the community. If you develop symptoms such as fever, cough, or SOB or if you have concerns about the presence of another infection including coronavirus (COVID-19), please contact your health care provider or visit [email or web address].     Disposition/Instructions    ED Visit recommended. Follow protocol based instructions.     Bring Your Own Device:  Please also bring your smart device(s) (smart phones, tablets, laptops) and their charging cables for your personal use and to communicate with your care team during your visit.    Thank you for taking steps to prevent the spread of this virus.  o Limit your contact with others.  o Wear a simple mask to cover your cough.  o Wash your hands well and often.    Resources    [facility]: About COVID-19: [email or web address].[email or web address] include [facility] beds [facility] the [email or web address]    CDC: What to Do If You're Sick: www.cdc.gov/coronavirus/2019-ncov/about/steps-when-sick.html    CDC: Ending Home Isolation: www.cdc.gov/coronavirus/2019-ncov/hcp/disposition-in-home-patients.html     CDC: Caring for Someone: www.cdc.gov/coronavirus/2019-ncov/if-you-are-sick/care-for-someone.html     [facility]: Interim Guidance for Hospital Discharge to Home: www.health.[facility].[place]/diseases/coronavirus/hcp/hospdischarge.pdf    [facility] clinical trials (COVID-19 research studies): clinicalaffairs.[facility]-clinical-trials     Below are the COVID-19 hotlines at the [place] [facility] ([facility]). Interpreters are available.   o For health questions: Call [phone] or 1-725.495.5009 (7 a.m. to 7 p.m.)  o For questions about schools and childcare: Call 208-349-6071 or 1-646.665.1465 (7 a.m. to 7 p.m.)

## 2022-01-09 NOTE — ED PROVIDER NOTES
EMERGENCY DEPARTMENT ENCOUNTER      NAME: Billie Garcias  AGE: 89 year old male  YOB: 1932  MRN: [id number]  EVALUATION DATE & TIME: 1/9/2022  5:19 PM    PCP: Paramjit Christian    ED PROVIDER: Lesly Iqbal M.D.      Chief Complaint   Patient presents with     Diarrhea     FINAL IMPRESSION:  1. Diarrhea, unspecified type    2. Dehydration      ED COURSE & MEDICAL DECISION MAKING:    Pertinent Labs & Imaging studies reviewed. (See chart for details)  ED Course as of 01/09/22 2148   Sun Jan 09, 2022 1727 Patient is a very pleasant 89-year-old male who comes in today with diarrhea.  He reports 6-7 episodes a day for the last week.  He denies any belly pain.  He denies nausea or vomiting.  He is eating well.  He has an unremarkable exam.  There is no tenderness on my exam.  He is not tachycardic.  He does not look acutely ill.  We will check some blood work and give him some fluids and go from there.  He is in agreement with the plan.   1857 Patient's lab work looks pretty good.  White count is a little bit elevated so I did order a C. difficile toxin on him.  I will check in shortly and see how he feels.   2110 Patient is unable to produce a stool sample.  He has had no more diarrhea here.  We will get him discharged.        5:22 PM I met with the patient for the initial interview and physical examination. Discussed plan for treatment and workup in the ED.      8:52 PM I rechecked and updated the patient.       At the conclusion of the encounter I discussed  the results of all of the tests and the disposition with patient.   All questions were answered.  The patient acknowledged understanding and was involved in the decision making regarding the overall care plan.      I discussed with patient the utility, limitations and findings of the exam/interventions/studies done during this visit as well as the list of differential diagnosis and symptoms to monitor/return to ER for.  Additional verbal  discharge instructions were provided.     PPE: Provider wore gloves, N95 mask, surgical cap.     MEDICATIONS GIVEN IN THE EMERGENCY:  Medications   0.9% sodium chloride BOLUS (0 mLs Intravenous Stopped 1/9/22 2051)       NEW PRESCRIPTIONS STARTED AT TODAY'S ER VISIT  New Prescriptions    No medications on file          =================================================================    HPI    Triage Note: Patient presents to ED via ambulance for loose stools that have been ongoing for the past week.  Denies any pain.  Denies blood in stool.  No nausea.      Patient information was obtained from: patient     Use of : N/A         Billie Garcias is a 89 year old male with a pertinent medical history of COPD, heart failure with reduced ejection fraction, dementia, who presents to the ED via EMS for evaluation of diarrhea.      Patient reports onset of diarrhea beginning 1 week ago with ~7 episodes daily. Patient notes that his wife gave him medication, but he is unsure which they were. He denies any recent antibiotics. Patient is unsure which daily medications he takes. Patient denies any blood in stools, fevers, chills, abdominal pain, vomiting, change in appetite, and any other symptoms or complaints at this time.        REVIEW OF SYSTEMS   Except as stated in the HPI all other systems reviewed and are negative.    PAST MEDICAL HISTORY:  No past medical history on file.    PAST SURGICAL HISTORY:  Past Surgical History:   Procedure Laterality Date     HEMORRHOID SURGERY  1951     [place] ESOPHAGOGASTRODUODENOSCOPY TRANSORAL DIAGNOSTIC N/A 10/21/2018    Procedure: ESOPHAGOGASTRODUODENOSCOPY (EGD) with biopsy;  Surgeon: Masoud Machuca MD;  Location: [facility];  Service: Gastroenterology     TONSILLECTOMY  1943       CURRENT MEDICATIONS:    No current facility-administered medications for this encounter.    Current Outpatient Medications:      acetaminophen (TYLENOL) 500 MG tablet, [ACETAMINOPHEN (TYLENOL)  500 MG TABLET] Take 500 mg by mouth at bedtime as needed for pain. Takes 2 pills at night if having difficulty sleeping, Disp: , Rfl:      albuterol (PROVENTIL) 2.5 mg /3 mL (0.083 %) nebulizer solution, [ALBUTEROL (PROVENTIL) 2.5 MG /3 ML (0.083 %) NEBULIZER SOLUTION] Take 3 mL (2.5 mg total) by nebulization every 4 (four) hours as needed for wheezing., Disp: 360 mL, Rfl: 6     digoxin (LANOXIN) 125 mcg tablet, [DIGOXIN (LANOXIN) 125 MCG TABLET] Take 1 tablet (125 mcg total) by mouth daily with supper., Disp: 90 tablet, Rfl: 1     fluticasone-salmeterol (ADVAIR) 250-50 mcg/dose DISKUS, [FLUTICASONE-SALMETEROL (ADVAIR) 250-50 MCG/DOSE DISKUS] Inhale 1 puff 2 (two) times a day., Disp: , Rfl:      furosemide (LASIX) 40 MG tablet, [FUROSEMIDE (LASIX) 40 MG TABLET] Take 0.5 tablets (20 mg total) by mouth every other day., Disp: 30 tablet, Rfl: 2     ipratropium-albuterol (DUO-NEB) 0.5-2.5 mg/3 mL nebulizer, [IPRATROPIUM-ALBUTEROL (DUO-NEB) 0.5-2.5 MG/3 ML NEBULIZER] Take 3 mL by nebulization 4 (four) times a day., Disp: 360 mL, Rfl: 6     lisinopril (PRINIVIL,ZESTRIL) 5 MG tablet, [LISINOPRIL (PRINIVIL,ZESTRIL) 5 MG TABLET] Take 1 tablet (5 mg total) by mouth at bedtime., Disp: 90 tablet, Rfl: 3     metoprolol succinate (TOPROL-XL) 25 MG, [METOPROLOL SUCCINATE (TOPROL-XL) 25 MG] Take 1 tablet (25 mg total) by mouth daily., Disp: 45 tablet, Rfl: 2     psyllium (METAMUCIL) 0.52 gram capsule, [PSYLLIUM (METAMUCIL) 0.52 GRAM CAPSULE] Take 0.52 g by mouth 2 (two) times a day., Disp: , Rfl:      senna-docusate (PERICOLACE) 8.6-50 mg tablet, [SENNA-DOCUSATE (PERICOLACE) 8.6-50 MG TABLET] Take 1 tablet by mouth 2 (two) times a day., Disp: , Rfl: 0     tamsulosin (FLOMAX) 0.4 mg cap, [TAMSULOSIN (FLOMAX) 0.4 MG CAP] Take 1 capsule (0.4 mg total) by mouth daily., Disp: 30 capsule, Rfl: 2     triamcinolone (KENALOG) 0.1 % cream, [TRIAMCINOLONE (KENALOG) 0.1 % CREAM] APPLY EXTERNALLY TO THE AFFECTED AREA TWICE DAILY, Disp: 80 g,  "Rfl: 0    ALLERGIES:  Allergies   Allergen Reactions     Spironolactone Rash     patient broke out in very bad rash on both arms.        FAMILY HISTORY:  Family History   Problem Relation Age of Onset     Cancer Mother      Other - See Comments Father 35.00         in a car accident     Diabetes Type 2  Brother      No Known Problems Brother      Cerebrovascular Disease Sister      No Known Problems Sister      Cerebrovascular Disease Sister      No Known Problems Daughter      No Known Problems Daughter      CABG Brother        SOCIAL HISTORY:   Social History     Socioeconomic History     Marital status:      Spouse name: Not on file     Number of children: 2     Years of education: Not on file     Highest education level: Not on file   Occupational History     Not on file   Tobacco Use     Smoking status: Former Smoker     Packs/day: 1.00     Years: 16.00     Pack years: 16.00     Types: Cigarettes, Cigarettes     Quit date: 1957     Years since quittin.0     Smokeless tobacco: Never Used   Substance and Sexual Activity     Alcohol use: No     Drug use: No     Sexual activity: Not Currently     Partners: Female   Other Topics Concern     Not on file   Social History Narrative    He lives with his wife.     Social Determinants of Health     Financial Resource Strain: Not on file   Food Insecurity: Not on file   Transportation Needs: Not on file   Physical Activity: Not on file   Stress: Not on file   Social Connections: Not on file   Intimate Partner Violence: Not on file   Housing Stability: Not on file       PHYSICAL EXAM    VITAL SIGNS: /62   Pulse 60   Temp 97.4  F (36.3  C) (Oral)   Resp 17   Ht 1.702 m (5' 7\")   SpO2 95%   BMI 22.40 kg/m     GENERAL: Awake, Alert, answering questions, No acute distress, Well nourished  HEENT: Normal cephalic, Atraumatic, bilateral external ears normal, No scleral icterus, mask in place  NECK: No obvious swelling or abnormality, No " stridor  PULMONARY:Normal and symmetric breath sounds, No respiratory distress, Lungs clear to auscultation bilaterally. No wheezing  CARDIOVASCULAR: Regular rate and rhythm, Distal pulses present and normal.  ABDOMINAL: Soft, Nondistended, Nontender, No flank tenderness, No palpable masses  BACK: No bruising or tenderness.  EXTREMITIES: Moves all extremities spontaneously, warm, no edema, No major deformities  NEURO: No facial droop, normal motor function, Normal speech   PSYCH: Normal mood and affect  SKIN: No rashes on visualized skin, dry, warm     LAB:  All pertinent labs reviewed and interpreted.  Results for orders placed or performed during the hospital encounter of 01/09/22   Comprehensive metabolic panel   Result Value Ref Range    Sodium 138 136 - 145 mmol/L    Potassium 5.0 3.5 - 5.0 mmol/L    Chloride 103 98 - 107 mmol/L    Carbon Dioxide (CO2) 26 22 - 31 mmol/L    Anion Gap 9 5 - 18 mmol/L    Urea Nitrogen 33 (H) 8 - 28 mg/dL    Creatinine 1.05 0.70 - 1.30 mg/dL    Calcium 9.2 8.5 - 10.5 mg/dL    Glucose 84 70 - 125 mg/dL    Alkaline Phosphatase 64 45 - 120 U/L    AST 17 0 - 40 U/L    ALT <9 0 - 45 U/L    Protein Total 6.4 6.0 - 8.0 g/dL    Albumin 3.5 3.5 - 5.0 g/dL    Bilirubin Total 0.8 0.0 - 1.0 mg/dL    GFR Estimate 68 >60 mL/min/1.73m2   Result Value Ref Range    Magnesium 2.0 1.8 - 2.6 mg/dL   CBC with platelets and differential   Result Value Ref Range    WBC Count 12.9 (H) 4.0 - 11.0 10e3/uL    RBC Count 3.84 (L) 4.40 - 5.90 10e6/uL    Hemoglobin 12.6 (L) 13.3 - 17.7 g/dL    Hematocrit 38.4 (L) 40.0 - 53.0 %     78 - 100 fL    MCH 32.8 26.5 - 33.0 pg    MCHC 32.8 31.5 - 36.5 g/dL    RDW 13.2 10.0 - 15.0 %    Platelet Count 219 150 - 450 10e3/uL    % Neutrophils 76 %    % Lymphocytes 14 %    % Monocytes 9 %    % Eosinophils 0 %    % Basophils 0 %    % Immature Granulocytes 1 %    NRBCs per 100 WBC 0 <1 /100    Absolute Neutrophils 9.9 (H) 1.6 - 8.3 10e3/uL    Absolute Lymphocytes 1.9 0.8  - 5.3 10e3/uL    Absolute Monocytes 1.2 0.0 - 1.3 10e3/uL    Absolute Eosinophils 0.0 0.0 - 0.7 10e3/uL    Absolute Basophils 0.0 0.0 - 0.2 10e3/uL    Absolute Immature Granulocytes 0.1 <=0.4 10e3/uL    Absolute NRBCs 0.0 10e3/uL     I, Kandy Goncalves, am serving as a scribe to document services personally performed by Dr. Iqbal based on my observation and the provider's statements to me. I, Lesly Iqbal MD attest that Kandy Goncalves is acting in a scribe capacity, has observed my performance of the services and has documented them in accordance with my direction.    Lesly Iqbal M.D.  Emergency Medicine  [facility] EMERGENCY DEPARTMENT  [address] [place] [id number]  [phone]  Dept: [phone]      Lesly Iqbal MD  01/09/22 9912

## 2022-01-09 NOTE — ED TRIAGE NOTES
Patient presents to ED via [facility] # 9 ambulance for loose stools that have been ongoing for the past week. Wife wanted him to come in.  Denies any pain.  Denies blood in stool.  No nausea. Appetite normal.

## 2022-01-09 NOTE — ED NOTES
Bed: JNED-11  Expected date: 1/9/22  Expected time: 5:05 PM  Means of arrival: Ambulance  Comments:  SPF 89 weak/diarrhea

## 2022-01-10 NOTE — ED NOTES
"Pt reports diarrhea x 2 weeks. He denies weakness, or recent falls. Denies abd pain, no blood in stools. He took \"something for the diarrhea, but it didn't work. He is alert/ oriented to himself, surroundings and situation. He thinks it's 2000 and that Michael is president.   "

## 2022-01-10 NOTE — DISCHARGE INSTRUCTIONS
You were seen in the Emergency Department today for evaluation of some diarrhea.  Your lab work showed no cause of your symptoms.  We tried to send a stool sample.  You felt much better after the fluids follow up with your primary care physician to ensure resolution of symptoms. Return if you have new or worsening symptoms.

## 2022-02-19 ENCOUNTER — HOSPITAL ENCOUNTER (OUTPATIENT)
Facility: HOSPITAL | Age: 87
Setting detail: OBSERVATION
Discharge: HOME | End: 2022-02-21
Attending: EMERGENCY MEDICINE | Admitting: FAMILY MEDICINE
Payer: COMMERCIAL

## 2022-02-19 DIAGNOSIS — K59.00 CONSTIPATION, UNSPECIFIED CONSTIPATION TYPE: ICD-10-CM

## 2022-02-19 DIAGNOSIS — K56.41 FECAL IMPACTION (H): ICD-10-CM

## 2022-02-19 DIAGNOSIS — K22.70 BARRETT'S ESOPHAGUS WITHOUT DYSPLASIA: Primary | Chronic | ICD-10-CM

## 2022-02-19 LAB — SARS-COV-2 RNA RESP QL NAA+PROBE: NEGATIVE

## 2022-02-19 PROCEDURE — G0378 HOSPITAL OBSERVATION PER HR: HCPCS

## 2022-02-19 PROCEDURE — 87635 SARS-COV-2 COVID-19 AMP PRB: CPT | Performed by: EMERGENCY MEDICINE

## 2022-02-19 PROCEDURE — C9803 HOPD COVID-19 SPEC COLLECT: HCPCS

## 2022-02-19 PROCEDURE — 250N000013 HC RX MED GY IP 250 OP 250 PS 637: Performed by: EMERGENCY MEDICINE

## 2022-02-19 PROCEDURE — 99219 PR INITIAL OBSERVATION CARE,LEVEL II: CPT | Performed by: INTERNAL MEDICINE

## 2022-02-19 PROCEDURE — 99285 EMERGENCY DEPT VISIT HI MDM: CPT | Mod: 25

## 2022-02-19 PROCEDURE — 250N000013 HC RX MED GY IP 250 OP 250 PS 637: Performed by: INTERNAL MEDICINE

## 2022-02-19 PROCEDURE — 250N000009 HC RX 250: Performed by: EMERGENCY MEDICINE

## 2022-02-19 RX ORDER — ONDANSETRON 4 MG/1
4 TABLET, ORALLY DISINTEGRATING ORAL EVERY 6 HOURS PRN
Status: DISCONTINUED | OUTPATIENT
Start: 2022-02-19 | End: 2022-02-21 | Stop reason: HOSPADM

## 2022-02-19 RX ORDER — DOCUSATE SODIUM 100 MG/1
100 CAPSULE, LIQUID FILLED ORAL 2 TIMES DAILY
Status: DISCONTINUED | OUTPATIENT
Start: 2022-02-19 | End: 2022-02-19

## 2022-02-19 RX ORDER — AMOXICILLIN 250 MG
2 CAPSULE ORAL 2 TIMES DAILY
Status: DISCONTINUED | OUTPATIENT
Start: 2022-02-19 | End: 2022-02-21 | Stop reason: HOSPADM

## 2022-02-19 RX ORDER — LIDOCAINE HYDROCHLORIDE 20 MG/ML
10 JELLY TOPICAL ONCE
Status: COMPLETED | OUTPATIENT
Start: 2022-02-19 | End: 2022-02-19

## 2022-02-19 RX ORDER — ONDANSETRON 2 MG/ML
4 INJECTION INTRAMUSCULAR; INTRAVENOUS EVERY 6 HOURS PRN
Status: DISCONTINUED | OUTPATIENT
Start: 2022-02-19 | End: 2022-02-21 | Stop reason: HOSPADM

## 2022-02-19 RX ORDER — BISACODYL 5 MG
5 TABLET, DELAYED RELEASE (ENTERIC COATED) ORAL ONCE
Status: DISCONTINUED | OUTPATIENT
Start: 2022-02-19 | End: 2022-02-19

## 2022-02-19 RX ORDER — DIGOXIN 125 MCG
125 TABLET ORAL
Status: DISCONTINUED | OUTPATIENT
Start: 2022-02-19 | End: 2022-02-21 | Stop reason: HOSPADM

## 2022-02-19 RX ORDER — TAMSULOSIN HYDROCHLORIDE 0.4 MG/1
0.4 CAPSULE ORAL DAILY
Status: DISCONTINUED | OUTPATIENT
Start: 2022-02-20 | End: 2022-02-21 | Stop reason: HOSPADM

## 2022-02-19 RX ORDER — BISACODYL 5 MG
10 TABLET, DELAYED RELEASE (ENTERIC COATED) ORAL ONCE
Status: COMPLETED | OUTPATIENT
Start: 2022-02-19 | End: 2022-02-19

## 2022-02-19 RX ORDER — LISINOPRIL 5 MG/1
5 TABLET ORAL AT BEDTIME
Status: DISCONTINUED | OUTPATIENT
Start: 2022-02-19 | End: 2022-02-21 | Stop reason: HOSPADM

## 2022-02-19 RX ADMIN — LISINOPRIL 5 MG: 5 TABLET ORAL at 20:16

## 2022-02-19 RX ADMIN — DOCUSATE SODIUM 50 MG AND SENNOSIDES 8.6 MG 2 TABLET: 8.6; 5 TABLET, FILM COATED ORAL at 20:16

## 2022-02-19 RX ADMIN — DOCUSATE SODIUM 286 ML: 50 LIQUID ORAL at 16:41

## 2022-02-19 RX ADMIN — MINERAL OIL 286 ML: 1000 LIQUID ORAL at 14:19

## 2022-02-19 RX ADMIN — BISACODYL 10 MG: 5 TABLET, COATED ORAL at 20:19

## 2022-02-19 RX ADMIN — LIDOCAINE HYDROCHLORIDE 10 ML: 20 JELLY TOPICAL at 16:50

## 2022-02-19 RX ADMIN — DIGOXIN 125 MCG: 125 TABLET ORAL at 20:16

## 2022-02-19 ASSESSMENT — ENCOUNTER SYMPTOMS
EYE REDNESS: 0
HEADACHES: 0
CONFUSION: 0
ABDOMINAL PAIN: 0
SHORTNESS OF BREATH: 0
NECK STIFFNESS: 0
CONSTIPATION: 1
ARTHRALGIAS: 0
DIFFICULTY URINATING: 0
COLOR CHANGE: 0
FEVER: 0

## 2022-02-19 NOTE — ED NOTES
Enema administered and pt. Is holding it at this time for about 30 minutes. Will attempt commode use after.

## 2022-02-19 NOTE — ED NOTES
[facility] ED Handoff Report    ED Chief Complaint: constipation    ED Diagnosis:  (K56.41) Fecal impaction (H)  Comment:   Plan:     (K59.00) Constipation, unspecified constipation type  Comment:   Plan:        PMH:  No past medical history on file.     Code Status:  No Order     Falls Risk: Yes Band: Applied    Current Living Situation/Residence: lives with a significant other     Elimination Status: Continent: wears briefs     Activity Level: Independent    Patients Preferred Language:  English     Needed: No    Vital Signs:  /67   Pulse 53   Temp 97.2  F (36.2  C) (Temporal)   Resp 20   SpO2 100%      Cardiac Rhythm: NSR    Pain Score: 0/10    Is the Patient Confused:  No    Last Food or Drink: 02/19/22 at prior to arrival to ED    Focused Assessment:  Diminished abdominal sounds.  Constipation.    Tests Performed: Done: Labs and Imaging    Treatments Provided:  enema    Family Dynamics/Concerns: No    Family Updated On Visitor Policy: Yes    Plan of Care Communicated to Family: Yes    Who Was Updated about Plan of Care: wife Pat    Belongings Checklist Done and Signed by Patient: Yes    Medications sent with patient: none    Covid: asymptomatic , results not available yet    Additional Information: none    RN: Meg Rolle   2/19/2022 5:09 PM

## 2022-02-19 NOTE — ED NOTES
Pt. In bed at this time. VSS. Enema administered with pt. In left OCHOA position. Awaiting results at this time.

## 2022-02-19 NOTE — ED TRIAGE NOTES
Pt instructed by Primary Provider to come to ED. No BM for at least 3 weeks, states he had CT scan done yesterday which shows a fecal impaction. C/o of some ABD pain and bloating. Unable to get 02 sat in triage, hands very cold.

## 2022-02-19 NOTE — ED NOTES
Per pt no oral interventions or enema for the last week to have BM but tried oral pills with no relief

## 2022-02-19 NOTE — PHARMACY-ADMISSION MEDICATION HISTORY
Pharmacy Note - Admission Medication History    Pertinent Provider Information: medication history confirmed with spouse, Louann (she sets up his medications). Metoprolol discontinued in previous 6 months.       ______________________________________________________________________    Prior To Admission (PTA) med list completed and updated in EMR.       PTA Med List   Medication Sig Last Dose     digoxin (LANOXIN) 125 mcg tablet [DIGOXIN (LANOXIN) 125 MCG TABLET] Take 1 tablet (125 mcg total) by mouth daily with supper. 2/18/2022 at pm     fluticasone-salmeterol (ADVAIR) 250-50 MCG/DOSE inhaler Inhale 1 puff into the lungs 2 times daily 2/19/2022 at am     lisinopril (PRINIVIL,ZESTRIL) 5 MG tablet [LISINOPRIL (PRINIVIL,ZESTRIL) 5 MG TABLET] Take 1 tablet (5 mg total) by mouth at bedtime. 2/18/2022 at hs     tamsulosin (FLOMAX) 0.4 mg cap [TAMSULOSIN (FLOMAX) 0.4 MG CAP] Take 1 capsule (0.4 mg total) by mouth daily. 2/19/2022 at am       Information source(s): Family member, Caregiver, Clinic records, Prescription bottles and CareEverywhere/SureScripts  Method of interview communication: phone    Summary of Changes to PTA Med List  New: nothing   Discontinued: metoprolol, prn furosemide from last year too   Changed: Advair to Wixela     Patient was asked about OTC/herbal products specifically.  PTA med list reflects this.    In the past week, patient estimated taking medication this percent of the time:  greater than 90%.    Allergies were reviewed, assessed, and updated with the patient.      Patient did not bring any medications to the hospital and can't retrieve from home. No multi-dose medications are available for use during hospital stay.     The information provided in this note is only as accurate as the sources available at the time of the update(s).    Thank you for the opportunity to participate in the care of this patient.    Jose Raul Valdez [facility]  2/19/2022 4:56 PM

## 2022-02-19 NOTE — ED NOTES
Wife Pat ([phone]) called and updated that patient will be admitted to the hospital this evening.

## 2022-02-19 NOTE — ED PROVIDER NOTES
EMERGENCY DEPARTMENT ENCOUNTER     NAME: Billie Garcias   AGE: 89 year old male   YOB: 1932   MRN: [id number]   EVALUATION DATE & TIME: 2/19/2022 12:49 PM   PCP: Paramjit Christian     Chief Complaint   Patient presents with     Fecal Impaction   :    FINAL IMPRESSION       1. Fecal impaction (H)    2. Constipation, unspecified constipation type           ED COURSE & MEDICAL DECISION MAKING    12:52 PM I met with the patient for initial interview and exam. Discussed plan of care including diagnostic testing and treatment.  4:34 PM I performed the fecal disimpaction. I was unable to disimpact the fecal matter. Plan for admission.  4:40 PM I paged the hospitalist.  4:58 PM I spoke with Dr. NICHO Quiles, hospitalist, over the phone. Patient accepted for admission.  5:00 PM I spoke with Dr. Caba, [facility], over the phone. Updated them regarding the patient.    Pertinent Labs & Imaging studies reviewed. (See chart for details)   89 year old male  presents to the Emergency Department for evaluation of 3 weeks of constipation.  Patient had a CT scan done as an outpatient by primary care yesterday which showed what looks like a fecal impaction with a very distended sigmoid colon and rectum, so much so that it is displacing his bladder.  Patient denies any difficulty urinating, and he had not tried really any treatment. Initial Vitals Reviewed. Initial exam notable for generally well-appearing elderly male with intact bowel sounds, no tenderness.  On rectal exam when I attempted manual disimpaction after a trial of enemas that were unsuccessful, I can feel the tip of a large stool ball, but it seems to go up further into the distended colonic wall that was seen on CT scan when I attempt to reach into it to disimpact and I was unable to manually disimpact him.  Because enemas were unsuccessful as well as manual disimpaction, I think he needs to be admitted to be cleaned out.  I have discussed the case with GI who  stated that they will consult in the morning and they have some potential things that may help like a Gastrografin enema but would like to see the patient to decide.  I have also discussed the case with hospitalist for admission.           At the conclusion of the encounter I discussed the results of all of the tests and the disposition. The questions were answered. The patient or family acknowledged understanding and was agreeable with the care plan.         MEDICATIONS GIVEN IN THE EMERGENCY:   Medications   Enema Compound (docusate/mag cit/mineral oil/NaPhos) SIMPLE (286 mLs Rectal Given 2/19/22 1419)   Enema Compound (docusate/mag cit/mineral oil/NaPhos) SIMPLE (286 mLs Rectal Given 2/19/22 1641)   lidocaine (XYLOCAINE) 2 % external gel 10 mL (10 mLs Urethral Given 2/19/22 1650)      NEW PRESCRIPTIONS STARTED AT TODAY'S ER VISIT   New Prescriptions    No medications on file     ================================================================   HISTORY OF PRESENT ILLNESS       Patient information was obtained from: patient   Use of Intrepreter: N/A  Billie Garcias is a 89 year old male with history of chronic heart failure with reduced ejection fraction, left bundle branch block, dilated cardiomyopathy, who presents for evaluation of 3 weeks of constipation. Reports he has not yet had a single bowel movement. He has tried oral medication to no effect; has not tried an enema. Patient had CT scan done 1 day ago at [facility], detailed below. He presented to ED per PCP recommendations after annual wellness exam 3 days ago. He denies abdominal pain, urinary symptoms, or any other complaints at this time.    Per chart review, CT abdomen pelvis with IV contrast on 2/18/22 (1 day ago) at [facility] CT showed: (1) Marked distention of the rectum and sigmoid which is full of stool. Findings are most consistent with impaction. No evidence of bowel obstruction. (2) The rectum and sigmoid are so  significantly distended that they are displacing the bladder superiorly and to the right. Patient may have difficulty voiding due to the compression of the bladder and bladder base by the rectum and sigmoid. (3) Wall thickening at the GE junction is nonspecific. Neoplasm could have this appearance. Recommend consultation with gastroenterology and possible endoscopy for further evaluation.    ================================================================    REVIEW OF SYSTEMS       Review of Systems   Constitutional: Negative for fever.   HENT: Negative for congestion.    Eyes: Negative for redness.   Respiratory: Negative for shortness of breath.    Cardiovascular: Negative for chest pain.   Gastrointestinal: Positive for constipation (3 weeks). Negative for abdominal pain.   Genitourinary: Negative.  Negative for difficulty urinating.   Musculoskeletal: Negative for arthralgias and neck stiffness.   Skin: Negative for color change.   Neurological: Negative for headaches.   Psychiatric/Behavioral: Negative for confusion.   All other systems reviewed and are negative.       PAST HISTORY     PAST MEDICAL HISTORY:   No past medical history on file.   PAST SURGICAL HISTORY:   Past Surgical History:   Procedure Laterality Date     HEMORRHOID SURGERY  1951     [place] ESOPHAGOGASTRODUODENOSCOPY TRANSORAL DIAGNOSTIC N/A 10/21/2018    Procedure: ESOPHAGOGASTRODUODENOSCOPY (EGD) with biopsy;  Surgeon: Masoud Machuca MD;  Location: [facility];  Service: Gastroenterology     TONSILLECTOMY  1943      CURRENT MEDICATIONS:   digoxin (LANOXIN) 125 mcg tablet  fluticasone-salmeterol (ADVAIR) 250-50 MCG/DOSE inhaler  lisinopril (PRINIVIL,ZESTRIL) 5 MG tablet  tamsulosin (FLOMAX) 0.4 mg cap      ALLERGIES:   Allergies   Allergen Reactions     Spironolactone Rash     patient broke out in very bad rash on both arms.       FAMILY HISTORY:   Family History   Problem Relation Age of Onset     Cancer Mother      Other - See Comments Father  35.00         in a car accident     Diabetes Type 2  Brother      No Known Problems Brother      Cerebrovascular Disease Sister      No Known Problems Sister      Cerebrovascular Disease Sister      No Known Problems Daughter      No Known Problems Daughter      CABG Brother       SOCIAL HISTORY:   Social History     Socioeconomic History     Marital status:      Spouse name: Not on file     Number of children: 2     Years of education: Not on file     Highest education level: Not on file   Occupational History     Not on file   Tobacco Use     Smoking status: Former Smoker     Packs/day: 1.00     Years: 16.00     Pack years: 16.00     Types: Cigarettes, Cigarettes     Quit date: 1957     Years since quittin.1     Smokeless tobacco: Never Used   Substance and Sexual Activity     Alcohol use: No     Drug use: No     Sexual activity: Not Currently     Partners: Female   Other Topics Concern     Not on file   Social History Narrative    He lives with his wife.     Social Determinants of Health     Financial Resource Strain: Not on file   Food Insecurity: Not on file   Transportation Needs: Not on file   Physical Activity: Not on file   Stress: Not on file   Social Connections: Not on file   Intimate Partner Violence: Not on file   Housing Stability: Not on file        VITALS  Patient Vitals for the past 24 hrs:   BP Temp Temp src Pulse Resp SpO2   22 1600 130/63 -- -- 53 -- 100 %   22 1545 135/67 -- -- 51 -- 100 %   22 1535 139/65 -- -- 53 -- 100 %   22 1434 114/54 -- -- 59 -- 98 %   22 1244 123/62 97.2  F (36.2  C) Temporal 64 20 --        ================================================================    PHYSICAL EXAM     VITAL SIGNS: /63   Pulse 53   Temp 97.2  F (36.2  C) (Temporal)   Resp 20   SpO2 100%    Constitutional:  Elderly male. Awake, no acute distress   HENT:  Atraumatic, oropharynx without exudate or erythema, membranes moist  Lymph:  No  adenopathy  Eyes: EOM intact, PERRL, no injection  Neck: Supple  Respiratory:  Clear to auscultation bilaterally, no wheezes or crackles   Cardiovascular:  Regular rate and rhythm, single S1 and S2   GI:  Soft, nontender, nondistended, no rebound or guarding   Musculoskeletal:  Moves all extremities, no lower extremity edema, no deformities    Skin:  Warm, dry  Neurologic:  Alert and oriented x3, no focal deficits noted       ================================================================  LAB       All pertinent labs reviewed and interpreted.   Labs Ordered and Resulted from Time of ED Arrival to Time of ED Departure - No data to display     ===============================================================  RADIOLOGY       Reviewed all pertinent imaging. Please see official radiology report.   No orders to display         ================================================================  EKG         I have independently reviewed and interpreted the EKG(s) documented above.     ================================================================  PROCEDURES     PROCEDURE: Fecal Disimpaction   INDICATIONS: Fecal Impaction   PROCEDURE PROVIDER: Dr Stacia Burgos   CONSENT: Risks, benefits and alternatives were discussed with and Verbal consent was obtained from Patient.   NOTE: Patient placed into lateral decubitus position. Provider lubricated their index finger and gently inserted finger into patients anus, past the rectum and into the rectal vault. Stool was unable to be manually disimpacted by provider.   COMPLICATIONS: Patient tolerated procedure well, with complication: unable to disimpact the large stool ball         I, Joe Wright, am serving as a scribe to document services personally performed by Dr. Reza based on my observation and the provider's statements to me. I, Stacia Reza MD attest that Joe Wright is acting in a scribe capacity, has observed my performance of the services and has documented them in  accordance with my direction.   Stacia Reza M.D.   Emergency Medicine   [facility] EMERGENCY DEPARTMENT  [address] [place] [id number] [phone]  Dept: [phone]      Stacia Reza MD  02/19/22 7163

## 2022-02-19 NOTE — ED NOTES
Patient completed last of pink lady.  Small amount of stool following enema.  Patient states he feels like there is more left.

## 2022-02-19 NOTE — H&P
[facility]    History and Physical - Hospitalist Service       Date of Admission:  2/19/2022    Assessment & Plan      Billie Garcias is a 89 year old male with history of chronic heart failure with reduced ejection fraction, left bundle branch block, dilated cardiomyopathy, who presents for evaluation of 3 weeks of constipation. Admitted for fecal impaction.    Fecal impaction: CT scan outside hospital on 2/18 shows marked distention of the rectum and sigmoid which is full of stool and the bladder was displaced. Unsuccessful enema and digital disimpaction in ER.  - Senna/colase 2 tabs bid  - Dulcolax 10 mg daily  - Fleet enema tonight  - If unsuccessful, may need endoscopy disimpaction.    Abnormal CT finding: CT reveals wall thickening at the GE junction maybe nonspecific, but neoplasm can't rule out.   - GI consult for possible endoscopy    Chronic heart failure with reduced ejection fraction, left bundle branch block, dilated cardiomyopathy: stable without active issues  - Continue PTA digoxin and lisinopril    BPH: continue PTA flomax       Diet:  Regular diet  DVT Prophylaxis: Low Risk/Ambulatory with no VTE prophylaxis indicated  Dejesus Catheter: Not present  Central Lines: None  Cardiac Monitoring: None  Code Status:  Full code      Disposition Plan   Expected Discharge: 1-2 days  Anticipated discharge location: Home       The patient's care was discussed with the Bedside Nurse, Care Coordinator/ and Patient.    Rae Rene MD  Hospitalist Service  [facility]  Securely message with the Vocera Web Console (learn more here)  Text page via [employer] Paging/Directory         ______________________________________________________________________    Chief Complaint   Constipation for 3 weeks    History is obtained from the patient. Patient is a poor historian.     History of Present Illness   Billie Garcias is a 89 year old male with history of  chronic heart failure with reduced ejection fraction, left bundle branch block, dilated cardiomyopathy, who presents for evaluation of 3 weeks of constipation. Patient reports a history of constipation. He normally uses dulcolax suppository daily. For the past 3 weeks, despite using dulcolax suppository, he did not have any bowel movement.  He tried oral bowel regimens, which did not help.  He visited his primary care provider yesterday and had a CT scan of the abdomen. Results showed marked distention of the rectum and sigmoid which is full of stool and the bladder was displaced.  Patient reports no fever, abdominal pain, nauseous, vomiting, chest pain or shortness of breath. No difficulty of urination. In ER, patient had a trial of enemas. But it was unsuccessful. Manual disimpaction was attempted. However, it was not successful since the stool went up further into the distended colonic wall.    Review of Systems    The 10 point Review of Systems is negative other than noted in the HPI or here.     Past Medical History    I have reviewed this patient's medical history and updated it with pertinent information if needed.   Chronic heart failure with reduced ejection fraction  Left bundle branch block  Dilated cardiomyopathy    Past Surgical History   I have reviewed this patient's surgical history and updated it with pertinent information if needed.  Past Surgical History:   Procedure Laterality Date     HEMORRHOID SURGERY  1951     [place] ESOPHAGOGASTRODUODENOSCOPY TRANSORAL DIAGNOSTIC N/A 10/21/2018    Procedure: ESOPHAGOGASTRODUODENOSCOPY (EGD) with biopsy;  Surgeon: Masoud Machuca MD;  Location: [facility];  Service: Gastroenterology     TONSILLECTOMY  1943       Social History   I have reviewed this patient's social history and updated it with pertinent information if needed.  Social History     Tobacco Use     Smoking status: Former Smoker     Packs/day: 1.00     Years: 16.00     Pack years: 16.00     Types:  Cigarettes, Cigarettes     Quit date: 1957     Years since quittin.1     Smokeless tobacco: Never Used   Substance Use Topics     Alcohol use: No     Drug use: No       Family History   I have reviewed this patient's family history and updated it with pertinent information if needed.  Family History   Problem Relation Age of Onset     Cancer Mother      Other - See Comments Father 35.00         in a car accident     Diabetes Type 2  Brother      No Known Problems Brother      Cerebrovascular Disease Sister      No Known Problems Sister      Cerebrovascular Disease Sister      No Known Problems Daughter      No Known Problems Daughter      CABG Brother        Prior to Admission Medications   Prior to Admission Medications   Prescriptions Last Dose Informant Patient Reported? Taking?   digoxin (LANOXIN) 125 mcg tablet 2022 at pm  No Yes   Sig: [DIGOXIN (LANOXIN) 125 MCG TABLET] Take 1 tablet (125 mcg total) by mouth daily with supper.   fluticasone-salmeterol (ADVAIR) 250-50 MCG/DOSE inhaler 2022 at am  Yes Yes   Sig: Inhale 1 puff into the lungs 2 times daily   lisinopril (PRINIVIL,ZESTRIL) 5 MG tablet 2022 at hs  No Yes   Sig: [LISINOPRIL (PRINIVIL,ZESTRIL) 5 MG TABLET] Take 1 tablet (5 mg total) by mouth at bedtime.   tamsulosin (FLOMAX) 0.4 mg cap 2022 at am  No Yes   Sig: [TAMSULOSIN (FLOMAX) 0.4 MG CAP] Take 1 capsule (0.4 mg total) by mouth daily.      Facility-Administered Medications: None     Allergies   Allergies   Allergen Reactions     Spironolactone Rash     patient broke out in very bad rash on both arms.        Physical Exam   Vital Signs: Temp: 97.5  F (36.4  C) Temp src: Oral BP: (!) 154/75 Pulse: 53   Resp: 22 SpO2: 98 % O2 Device: None (Room air)    Weight: 126 lbs 9.6 oz    General appearance: not in acute distress  HEENT: PERRL, EOMI  Lungs: Clear breath sounds in bilateral lung fields  Cardiovascular: Regular rate and rhythm, normal S1-S2  Abdomen: Soft, non  tender, no distension, normal bowel sound  Musculoskeletal: No joint swelling  Skin: No rash and no edema  Neurology: AAO ×3.  Cranial nerves II - XII normal.  Normal muscle strength in all four extremities.    Data   Data reviewed today: I reviewed all medications, new labs and imaging results over the last 24 hours.    No lab results found in last 7 days.    CT abdomen pelvis with IV contrast on 2/18/22 (1 day ago) at [facility]   (1) Marked distention of the rectum and sigmoid which is full of stool. Findings are most consistent with impaction. No evidence of bowel obstruction. (2) The rectum and sigmoid are so significantly distended that they are displacing the bladder superiorly and to the right. Patient may have difficulty voiding due to the compression of the bladder and bladder base by the rectum and sigmoid. (3) Wall thickening at the GE junction is nonspecific. Neoplasm could have this appearance. Recommend consultation with gastroenterology and possible endoscopy for further evaluation.

## 2022-02-20 ENCOUNTER — APPOINTMENT (OUTPATIENT)
Dept: RADIOLOGY | Facility: HOSPITAL | Age: 87
End: 2022-02-20
Attending: INTERNAL MEDICINE
Payer: COMMERCIAL

## 2022-02-20 PROCEDURE — G0378 HOSPITAL OBSERVATION PER HR: HCPCS

## 2022-02-20 PROCEDURE — 99207 PR CDG-MDM COMPONENT: MEETS MODERATE - DOWN CODED: CPT | Performed by: HOSPITALIST

## 2022-02-20 PROCEDURE — 74270 X-RAY XM COLON 1CNTRST STD: CPT

## 2022-02-20 PROCEDURE — 250N000013 HC RX MED GY IP 250 OP 250 PS 637: Performed by: INTERNAL MEDICINE

## 2022-02-20 PROCEDURE — 99225 PR SUBSEQUENT OBSERVATION CARE,LEVEL II: CPT | Performed by: HOSPITALIST

## 2022-02-20 RX ORDER — POLYETHYLENE GLYCOL 3350 17 G/17G
17 POWDER, FOR SOLUTION ORAL 2 TIMES DAILY
Status: DISCONTINUED | OUTPATIENT
Start: 2022-02-20 | End: 2022-02-21 | Stop reason: HOSPADM

## 2022-02-20 RX ADMIN — TAMSULOSIN HYDROCHLORIDE 0.4 MG: 0.4 CAPSULE ORAL at 08:49

## 2022-02-20 RX ADMIN — LISINOPRIL 5 MG: 5 TABLET ORAL at 22:32

## 2022-02-20 RX ADMIN — DIATRIZOATE MEGLUMINE AND DIATRIZOATE SODIUM 120 ML: 660; 100 SOLUTION ORAL; RECTAL at 12:10

## 2022-02-20 RX ADMIN — POLYETHYLENE GLYCOL 3350 17 G: 17 POWDER, FOR SOLUTION ORAL at 08:47

## 2022-02-20 RX ADMIN — DOCUSATE SODIUM 50 MG AND SENNOSIDES 8.6 MG 2 TABLET: 8.6; 5 TABLET, FILM COATED ORAL at 08:49

## 2022-02-20 RX ADMIN — POLYETHYLENE GLYCOL 3350 17 G: 17 POWDER, FOR SOLUTION ORAL at 22:32

## 2022-02-20 RX ADMIN — DOCUSATE SODIUM 50 MG AND SENNOSIDES 8.6 MG 2 TABLET: 8.6; 5 TABLET, FILM COATED ORAL at 22:32

## 2022-02-20 RX ADMIN — DIGOXIN 125 MCG: 125 TABLET ORAL at 19:41

## 2022-02-20 ASSESSMENT — ACTIVITIES OF DAILY LIVING (ADL): DEPENDENT_IADLS:: INDEPENDENT

## 2022-02-20 NOTE — PLAN OF CARE
"PRIMARY DIAGNOSIS: \"GENERIC\" NURSING  OUTPATIENT/OBSERVATION GOALS TO BE MET BEFORE DISCHARGE:  1. ADLs back to baseline: Yes    2. Activity and level of assistance: Up with standby assistance.    3. Pain status: Improved with use of alternative comfort measures i.e.: positioning    4. Return to near baseline physical activity: Yes     Discharge Planner Nurse   Safe discharge environment identified: No  Barriers to discharge: Yes       Entered by: Alton Méndez 02/20/2022 5:33 AM     Please review provider order for any additional goals.   Nurse to notify provider when observation goals have been met and patient is ready for discharge.  "

## 2022-02-20 NOTE — PLAN OF CARE
"PRIMARY DIAGNOSIS: \"GENERIC\" NURSING  OUTPATIENT/OBSERVATION GOALS TO BE MET BEFORE DISCHARGE:  1. ADLs back to baseline: No    2. Activity and level of assistance: Up with standby assistance.    3. Pain status: Improved with use of alternative comfort measures i.e.: positioning    4. Return to near baseline physical activity: No     Discharge Planner Nurse   Safe discharge environment identified: No  Barriers to discharge: Yes       Entered by: Alton Méndez 02/20/2022 5:29 AM     Please review provider order for any additional goals.   Nurse to notify provider when observation goals have been met and patient is ready for discharge.    "

## 2022-02-20 NOTE — PROGRESS NOTES
"[facility]  Hospitalist Progress Note  [facility]        Date of Service (when I saw the patient): 2022  Ced Null,   2022        Interval History:      Patient states he is doing ok today, discussed plan of care.          Assessment and Plan:        Billie Garcias is a 89 year old male with history of chronic heart failure with reduced ejection fraction, left bundle branch block, dilated cardiomyopathy, who presents for evaluation of 3 weeks of constipation. Admitted for fecal impaction.     Fecal impaction: CT scan outside hospital on  shows marked distention of the rectum and sigmoid which is full of stool and the bladder was displaced. Unsuccessful enema and digital disimpaction in ER.  - Bowel regimen.   - Gastrografin enema.   - EGD and flex sig planned.   - GI following.      Abnormal CT finding: CT reveals wall thickening at the GE junction maybe nonspecific, but neoplasm can't rule out.   - GI consulted     Chronic heart failure with reduced ejection fraction, left bundle branch block, dilated cardiomyopathy: stable without active issues  - Continue PTA digoxin and lisinopril     BPH: continue PTA flomax     DVT Prophylaxis: Low Risk/Ambulatory with no VTE prophylaxis indicated    Diet: Orders Placed This Encounter      Regular Diet Adult    Code: Full Code    Anticipated discharge date: 1-2 days pending course.   Milestones/needs for discharge: EGD, flex sig.   Pending tests or labs: EGD, flex sig.   Length of Stay:  LOS: 0 days /LOS: 0    Text page via [facility] Paging/Directory                   Physical Exam:           Blood pressure 121/70, pulse 61, temperature 98  F (36.7  C), temperature source Oral, resp. rate 18, height 1.753 m (5' 9\"), weight 57.4 kg (126 lb 9.6 oz), SpO2 95 %.    Vital Sign Ranges  Temperature Temp  Av.6  F (36.4  C)  Min: 97.2  F (36.2  C)  Max: 98  F (36.7  C)   Blood pressure Systolic (24hrs), Av , " Min:103 , Max:154        Diastolic (24hrs), Av, Min:54, Max:78      Pulse Pulse  Av.3  Min: 51  Max: 68   Respirations Resp  Av.6  Min: 18  Max: 22   Pulse oximetry SpO2  Av.1 %  Min: 95 %  Max: 100 %     Vital Signs with Ranges  Temp:  [97.2  F (36.2  C)-98  F (36.7  C)] 98  F (36.7  C)  Pulse:  [51-68] 61  Resp:  [18-22] 18  BP: (103-154)/(54-78) 121/70  SpO2:  [95 %-100 %] 95 %  Temp:  [97.2  F (36.2  C)-98  F (36.7  C)] 98  F (36.7  C)  Pulse:  [51-68] 61  Resp:  [18-22] 18  BP: (103-154)/(54-78) 121/70  SpO2:  [95 %-100 %] 95 %    I/O Last 3 Shifts:   I/O last 3 completed shifts:  In: 240 [P.O.:240]  Out: -     I/O past 24 hours:     Intake/Output Summary (Last 24 hours) at 2022 0754  Last data filed at 2022  Gross per 24 hour   Intake 240 ml   Output --   Net 240 ml       Oxygen  Temp: 98  F (36.7  C) Temp src: Oral BP: 121/70 Pulse: 61   Resp: 18 SpO2: 95 % O2 Device: None (Room air)    Vitals:    22 1734   Weight: 57.4 kg (126 lb 9.6 oz)     GENERAL: NAD. Conversational, appropriate.   HEENT: Normocephalic. Nares normal.   LUNGS: Clear to auscultation. No dyspnea at rest.   HEART: Regular rate. Extremities perfused.   ABDOMEN: Soft, distended. Hypoactive bowel sounds.   EXTREMITIES: No LE edema noted.   NEUROLOGIC: Moves extremities x4, follows commands.          Prior to Admission Medications:        Medications Prior to Admission   Medication Sig Dispense Refill Last Dose     digoxin (LANOXIN) 125 mcg tablet [DIGOXIN (LANOXIN) 125 MCG TABLET] Take 1 tablet (125 mcg total) by mouth daily with supper. 90 tablet 1 2022 at pm     fluticasone-salmeterol (ADVAIR) 250-50 MCG/DOSE inhaler Inhale 1 puff into the lungs 2 times daily   2022 at am     lisinopril (PRINIVIL,ZESTRIL) 5 MG tablet [LISINOPRIL (PRINIVIL,ZESTRIL) 5 MG TABLET] Take 1 tablet (5 mg total) by mouth at bedtime. 90 tablet 3 2022 at hs     tamsulosin (FLOMAX) 0.4 mg cap [TAMSULOSIN (FLOMAX) 0.4  MG CAP] Take 1 capsule (0.4 mg total) by mouth daily. 30 capsule 2 2/19/2022 at am            Medications:        Current Facility-Administered Medications   Medication Last Rate     Current Facility-Administered Medications   Medication Dose Route Frequency     digoxin  125 mcg Oral Daily with supper     lisinopril  5 mg Oral At Bedtime     senna-docusate  2 tablet Oral BID     tamsulosin  0.4 mg Oral Daily     Current Facility-Administered Medications   Medication Dose Route Frequency     melatonin  1 mg Oral At Bedtime PRN     ondansetron  4 mg Oral Q6H PRN    Or     ondansetron  4 mg Intravenous Q6H PRN     ___________________________________________________________________________  Medications       digoxin  125 mcg Oral Daily with supper     lisinopril  5 mg Oral At Bedtime     senna-docusate  2 tablet Oral BID     tamsulosin  0.4 mg Oral Daily          Data:      Lab data reviewed.     Data   No lab results found in last 7 days.        Imaging:      Imaging data reviewed.     No results found for this or any previous visit (from the past 24 hour(s)).    Dr. Ced Null DO, INEZ, A  [facility]  Pager [phone]  Text page via [facility] Paging/Directory

## 2022-02-20 NOTE — CONSULTS
Care Management Initial Consult    General Information  Assessment completed with: Patient,  (Billie)  Type of CM/SW Visit: Initial Assessment    Primary Care Provider verified and updated as needed: Yes   Readmission within the last 30 days:   no        Advance Care Planning: Advance Care Planning Reviewed: questions answered          Communication Assessment  Patient's communication style: spoken language (English or Bilingual)    Hearing Difficulty or Deaf: no        Cognitive  Cognitive/Neuro/Behavioral: orientation        Orientation: disoriented to, situation             Living Environment:   People in home: spouse   (Louann)  Current living Arrangements: house      Able to return to prior arrangements: yes       Family/Social Support:  Care provided by: self  Provides care for: no one  Marital Status:   Wife, Children   (Louann)       Description of Support System: Supportive, Involved    Support Assessment: Adequate family and caregiver support    Current Resources:   Patient receiving home care services: No     Community Resources: None  Equipment currently used at home: none  Supplies currently used at home: None    Employment/Financial:  Employment Status: , previous service, retired     Employment/ Comments:  (4 years [employer] [place])  Financial Concerns: No concerns identified   Referral to Financial Worker: No       Lifestyle & Psychosocial Needs:  Social Determinants of Health     Tobacco Use: Medium Risk     Smoking Tobacco Use: Former Smoker     Smokeless Tobacco Use: Never Used   Alcohol Use: Not on file   Financial Resource Strain: Not on file   Food Insecurity: Not on file   Transportation Needs: Not on file   Physical Activity: Not on file   Stress: Not on file   Social Connections: Not on file   Intimate Partner Violence: Not on file   Depression: Not on file   Housing Stability: Not on file       Functional Status:  Prior to admission patient needed assistance:    Dependent ADLs:: Independent  Dependent IADLs:: Independent       Mental Health Status:  Mental Health Status: No Current Concerns       Chemical Dependency Status:  Chemical Dependency Status: No Current Concerns             Values/Beliefs:  Spiritual, Cultural Beliefs, Hoahaoism Practices, Values that affect care: no          Values/Beliefs Comment:  (Elise)    Additional Information:  SW spoke to pt in pt room, pt lives with wife in private home and is independent, and drives. Pt oldest daughter lives next door. Pt wife or daughter to transport home. Pt denies any needs at home and states he does everything himself or with his wife.     GABBY David

## 2022-02-20 NOTE — PLAN OF CARE
Goal Outcome Evaluation:    GI: Flexible scope completed for stool removal. Patient has had two large loose bowel movements upon return to the floor. Patient encouraged to proactively toilet but declined when staff offered. Did not call for assistance upon defecating and did not seem aware of occurrence.

## 2022-02-20 NOTE — CONSULTS
[facility] consult         Name: Billie Garcias    Medical Record #: [id number]    YOB: 1932    Date/Time: 2/20/2022/7:36 AM    Reason for Consultation: Ced Null DO has asked me to evaluate Billie Garcias regarding fecal impaction, constipation.    HPI: 89-year-old gentleman with CHF/dilated cardiomyopathy, left bundle branch block who presented to the ED yesterday for 3 weeks history of constipation.  CT scan done as outpatient by his primary care provider had shown a very distended rectosigmoid colon with possible fecal impaction.  Manual disimpaction was attempted in the ED but the stool ball seemed to move up further into the distended colon.  He was given pink lady enemas in the ED with which he initially did not have any bowel movements and therefore was admitted.    Ultimately, ended up having large bowel movement once he arrived on the floor.  Initially a fleets enema was planned but this was canceled as the patient had a large bowel movement.  He has been started on senna-S twice daily.  Review of previous labs shows normal TSH in August 2021, normal sodium, potassium and magnesium on 2/16/2021. Tells me that he suffers from chronic constipation but does not take anything for it. Abdomen feels less distended today after having a BM last night.    Incidentally, there was wall thickening noted at the GE junction on CT abdomen and pelvis.  This was considered to be nonspecific.  Patient denies dysphagia or heartburn.  His last EGD in 2018 showed Torres's esophagus and decreased motility endoscopically. Eating breakfast without problems at the time of my exam.    He has never had a colonoscopy, flex sig in 2006 was unremarkable.    Review of Systems (ROS): Complete ROS otherwise negative except for as above.    Past Medical History:  No past medical history on file.    Medications:   Medications Prior to Admission   Medication Sig Dispense Refill Last Dose      digoxin (LANOXIN) 125 mcg tablet [DIGOXIN (LANOXIN) 125 MCG TABLET] Take 1 tablet (125 mcg total) by mouth daily with supper. 90 tablet 1 2022 at pm     fluticasone-salmeterol (ADVAIR) 250-50 MCG/DOSE inhaler Inhale 1 puff into the lungs 2 times daily   2022 at am     lisinopril (PRINIVIL,ZESTRIL) 5 MG tablet [LISINOPRIL (PRINIVIL,ZESTRIL) 5 MG TABLET] Take 1 tablet (5 mg total) by mouth at bedtime. 90 tablet 3 2022 at hs     tamsulosin (FLOMAX) 0.4 mg cap [TAMSULOSIN (FLOMAX) 0.4 MG CAP] Take 1 capsule (0.4 mg total) by mouth daily. 30 capsule 2 2022 at am       Current Facility-Administered Medications:      digoxin (LANOXIN) tablet 125 mcg, 125 mcg, Oral, Daily with supper, Rae Rene MD, 125 mcg at 22     lisinopril (ZESTRIL) tablet 5 mg, 5 mg, Oral, At Bedtime, Rae Rene MD, 5 mg at 22     melatonin tablet 1 mg, 1 mg, Oral, At Bedtime PRN, Rae Rene MD     ondansetron (ZOFRAN-ODT) ODT tab 4 mg, 4 mg, Oral, Q6H PRN **OR** ondansetron (ZOFRAN) injection 4 mg, 4 mg, Intravenous, Q6H PRN, Rae Rene MD     senna-docusate (SENOKOT-S/PERICOLACE) 8.6-50 MG per tablet 2 tablet, 2 tablet, Oral, BID, Rae Reen MD, 2 tablet at 22     tamsulosin (FLOMAX) capsule 0.4 mg, 0.4 mg, Oral, Daily, Rae Rene MD       Allergies: Spironolactone    Family History:  Family History   Problem Relation Age of Onset     Cancer Mother      Other - See Comments Father 35.00         in a car accident     Diabetes Type 2  Brother      No Known Problems Brother      Cerebrovascular Disease Sister      No Known Problems Sister      Cerebrovascular Disease Sister      No Known Problems Daughter      No Known Problems Daughter      CABG Brother        Social History:  Social History     Tobacco Use     Smoking status: Former Smoker     Packs/day: 1.00     Years: 16.00     Pack years: 16.00     Types: Cigarettes, Cigarettes     Quit date: 1957     Years since  "quittin.1     Smokeless tobacco: Never Used   Substance Use Topics     Alcohol use: No     Drug use: No           Physical Exam: /70 (BP Location: Right arm)   Pulse 61   Temp 98  F (36.7  C) (Oral)   Resp 18   Ht 1.753 m (5' 9\")   Wt 57.4 kg (126 lb 9.6 oz)   SpO2 95%   BMI 18.70 kg/m      General: NAD  Eyes: Anicteric  Pulmonary: Normal work of breathing, no accessory muscle use, no audible wheezing  Cardiovascular: Regular rate and rhythm   Gastrointestinal: Soft, minimally distended, non-tender  Skin: No jaundice  Neurologic: Alert and oriented ×3, no focal deficits  Extremities: No edema  Psych: Pleasant, cooperative    Labs:    CBC RESULTS:   Recent Labs   Lab Test 22  1756   WBC 12.9*   RBC 3.84*   HGB 12.6*   HCT 38.4*      MCH 32.8   MCHC 32.8   RDW 13.2           CMP Results:   Recent Labs   Lab Test 22  1756      POTASSIUM 5.0   CHLORIDE 103   CO2 26   ANIONGAP 9   GLC 84   BUN 33*   CR 1.05   BILITOTAL 0.8   ALKPHOS 64   ALT <9   AST 17        INR Results:   Recent Labs   Lab Test 19  1002   INR 1.11*          Radiology:  CT ABD PELVIS W IV CONT   LOCATION: [facility]   DATE/TIME: 2022 12:05 PM     INDICATION: Abdominal distention with change in bowel habits. Bowel obstruction suspected.   COMPARISON: None.   TECHNIQUE: Helical enhanced thin-section CT scan of the abdomen and pelvis was performed following injection of IV contrast. Multiplanar reformats were obtained. Dose reduction techniques were used.   CONTRAST: IOHEXOL 350 MG/ML IV SOLN 100 mL     FINDINGS:   LOWER CHEST: Bronchiectasis with bronchial wall thickening. Atelectasis at the lung bases. No pleural effusion.     HEPATOBILIARY: Absence of the left hepatic lobe. Gallbladder contains stones.     PANCREAS: Pancreas is atrophic.     SPLEEN: Normal.     ADRENAL GLANDS: Adrenal glands are unremarkable.     KIDNEYS/BLADDER: Simple right renal cyst. This does not require " follow-up. No hydronephrosis. Bladder is compressed and displaced by a very distended rectum. The bladder is seen extending up and to the right. Bladder is somewhat distended. At the tip of the bladder there are multiple small linear densities which are most likely due to bladder trabeculation and small diverticula.     BOWEL: Rectum is very distended measuring up to 11.3 cm in diameter. The sigmoid is also markedly distended measuring 12.2 cm. The entire colon is full of stool, although more normal in caliber proximally. There is significant air in the colon as well.     Small bowel loops are normal in caliber and there is no evidence of bowel obstruction.     LYMPH NODES: No retroperitoneal adenopathy. No pelvic adenopathy.     VASCULATURE: Vascular calcification. No aneurysm.     PELVIC ORGANS: Normal.     MUSCULOSKELETAL: Scoliosis of the lumbar spine convex to the right. Degenerative changes in the spine. Sclerotic lesion in the right iliac wing measures 8 mm.     IMPRESSION:   1.  Marked distention of the rectum and sigmoid which is full of stool. Findings are most consistent with impaction. No evidence of bowel obstruction.     2.  The rectum and sigmoid are so significantly distended that they are displacing the bladder superiorly and to the right. Patient may have difficulty voiding due to the compression of the bladder and bladder base by the rectum and sigmoid.     3.  Wall thickening at the GE junction is nonspecific. Neoplasm could have this appearance. Recommend consultation with gastroenterology and possible endoscopy for further evaluation.       Impression:   1.  Constipation/fecal impaction-did have a large bowel movement yesterday after pink lady enemas.  TSH 8/2021 normal, Na, K, Mg normal 2/16/2022.  Flex sig 2006 unremarkable.  Never had a colonoscopy.  2.  History of Torres's esophagus with CT showing wall thickening at GE junction -?  Esophagitis from reflux versus neoplasm.  3.  Dilated  cardiomyopathy-last EF 10/2020 of 30%    Recommendation:   -Continue Senna-S twice daily  -Add MiraLAX twice daily  -Check calcium level  -Gastrografin enema today to help mobilize stool further  -EGD to evaluate GE junction thickening.  Obtain flex sig at the same time to rule out rectosigmoid process causing fecal impaction.  This will be done with anesthesia assistance tomorrow given that patient ate today and due to reduced EF.      Total visit time = 42 minutes; more than 50% spent counseling/coordinating care.                                                  ROSE Ramesh  Thank you for the opportunity to participate in the care of this patient.   Please feel free to call me with any questions or concerns.  Phone number [phone].

## 2022-02-20 NOTE — PROGRESS NOTES
Patient had a large bowel movement after arriving to the unit. Fleet enema was cancelled.     [facility] medicine service  Rae Rene MD

## 2022-02-20 NOTE — UTILIZATION REVIEW
Admission Status; Secondary Review Determination     Under the authority of the Utilization Management Committee, the utilization review process indicated a secondary review on the above patient.  The review outcome is based on review of the medical records, discussions with staff, and applying clinical experience noted on the date of the review.       (x) Observation Status Appropriate      RATIONALE FOR DETERMINATION    SUMMARY:  89 year old male with history of chronic heart failure with reduced ejection fraction, left bundle branch block, dilated cardiomyopathy, who presents for evaluation of 3 weeks of constipation. Admitted for fecal impaction.  Initial enema and digital disimpaction unsuccessful.  Stable vitals/labs.  Patient receiving gastrografin enema today, oral constipation treatment with plans for EGD/flex sig tomorrow.  Possible discharge tomorrow per notes if impaction can be relieved.    The severity of illness, intensity of service provided, expected LOS and risk for adverse outcome make the care appropriate for observation.     The information on this document is developed by the utilization review team in order for the business office to ensure compliance.  This only denotes the appropriateness of proper admission status and does not reflect the quality of care rendered.       The definitions of Inpatient Status and Observation Status used in making the determination above are those provided in the CMS Coverage Manual, Chapter 1 and Chapter 6, section 70.4.     Sincerely,  Daniel Schulte DO, [facility]  Utilization Review  Physician Advisor

## 2022-02-20 NOTE — PLAN OF CARE
"PRIMARY DIAGNOSIS: \"GENERIC\" NURSING  OUTPATIENT/OBSERVATION GOALS TO BE MET BEFORE DISCHARGE:  1. ADLs back to baseline: No    2. Activity and level of assistance: Up with standby assistance.    3. Pain status: Improved with use of alternative comfort measures i.e.: positioning    4. Return to near baseline physical activity: No     Discharge Planner Nurse   Safe discharge environment identified: No  Barriers to discharge: Yes       Entered by: Alton Méndez 02/20/2022 5:24 AM     Please review provider order for any additional goals.   Nurse to notify provider when observation goals have been met and patient is ready for discharge.  "

## 2022-02-21 ENCOUNTER — ANESTHESIA (OUTPATIENT)
Dept: SURGERY | Facility: HOSPITAL | Age: 87
End: 2022-02-21
Payer: COMMERCIAL

## 2022-02-21 ENCOUNTER — ANESTHESIA EVENT (OUTPATIENT)
Dept: SURGERY | Facility: HOSPITAL | Age: 87
End: 2022-02-21
Payer: COMMERCIAL

## 2022-02-21 VITALS
HEART RATE: 63 BPM | BODY MASS INDEX: 18.75 KG/M2 | SYSTOLIC BLOOD PRESSURE: 149 MMHG | OXYGEN SATURATION: 100 % | HEIGHT: 69 IN | TEMPERATURE: 98.2 F | DIASTOLIC BLOOD PRESSURE: 80 MMHG | RESPIRATION RATE: 20 BRPM | WEIGHT: 126.6 LBS

## 2022-02-21 LAB
ANION GAP SERPL CALCULATED.3IONS-SCNC: 8 MMOL/L (ref 5–18)
BUN SERPL-MCNC: 24 MG/DL (ref 8–28)
CALCIUM SERPL-MCNC: 8.1 MG/DL (ref 8.5–10.5)
CHLORIDE BLD-SCNC: 106 MMOL/L (ref 98–107)
CO2 SERPL-SCNC: 25 MMOL/L (ref 22–31)
CREAT SERPL-MCNC: 0.88 MG/DL (ref 0.7–1.3)
ERYTHROCYTE [DISTWIDTH] IN BLOOD BY AUTOMATED COUNT: 13 % (ref 10–15)
FLEXIBLE SIGMOIDOSCOPY: NORMAL
GFR SERPL CREATININE-BSD FRML MDRD: 82 ML/MIN/1.73M2
GLUCOSE BLD-MCNC: 93 MG/DL (ref 70–125)
HCT VFR BLD AUTO: 34.8 % (ref 40–53)
HGB BLD-MCNC: 11.4 G/DL (ref 13.3–17.7)
MCH RBC QN AUTO: 32.8 PG (ref 26.5–33)
MCHC RBC AUTO-ENTMCNC: 32.8 G/DL (ref 31.5–36.5)
MCV RBC AUTO: 100 FL (ref 78–100)
PLATELET # BLD AUTO: 173 10E3/UL (ref 150–450)
POTASSIUM BLD-SCNC: 4.1 MMOL/L (ref 3.5–5)
RBC # BLD AUTO: 3.48 10E6/UL (ref 4.4–5.9)
SODIUM SERPL-SCNC: 139 MMOL/L (ref 136–145)
UPPER GI ENDOSCOPY: NORMAL
WBC # BLD AUTO: 7.2 10E3/UL (ref 4–11)

## 2022-02-21 PROCEDURE — G0378 HOSPITAL OBSERVATION PER HR: HCPCS

## 2022-02-21 PROCEDURE — 36415 COLL VENOUS BLD VENIPUNCTURE: CPT | Performed by: HOSPITALIST

## 2022-02-21 PROCEDURE — 272N000001 HC OR GENERAL SUPPLY STERILE: Performed by: INTERNAL MEDICINE

## 2022-02-21 PROCEDURE — 710N000009 HC RECOVERY PHASE 1, LEVEL 1, PER MIN: Performed by: INTERNAL MEDICINE

## 2022-02-21 PROCEDURE — 99217 PR OBSERVATION CARE DISCHARGE: CPT | Performed by: HOSPITALIST

## 2022-02-21 PROCEDURE — 250N000011 HC RX IP 250 OP 636: Performed by: NURSE ANESTHETIST, CERTIFIED REGISTERED

## 2022-02-21 PROCEDURE — 85027 COMPLETE CBC AUTOMATED: CPT | Performed by: HOSPITALIST

## 2022-02-21 PROCEDURE — 258N000003 HC RX IP 258 OP 636: Performed by: NURSE ANESTHETIST, CERTIFIED REGISTERED

## 2022-02-21 PROCEDURE — 88305 TISSUE EXAM BY PATHOLOGIST: CPT | Mod: TC | Performed by: INTERNAL MEDICINE

## 2022-02-21 PROCEDURE — 360N000075 HC SURGERY LEVEL 2, PER MIN: Performed by: INTERNAL MEDICINE

## 2022-02-21 PROCEDURE — 80048 BASIC METABOLIC PNL TOTAL CA: CPT | Performed by: HOSPITALIST

## 2022-02-21 PROCEDURE — 258N000003 HC RX IP 258 OP 636: Performed by: ANESTHESIOLOGY

## 2022-02-21 PROCEDURE — 250N000009 HC RX 250: Performed by: NURSE ANESTHETIST, CERTIFIED REGISTERED

## 2022-02-21 PROCEDURE — 250N000013 HC RX MED GY IP 250 OP 250 PS 637: Performed by: INTERNAL MEDICINE

## 2022-02-21 PROCEDURE — 370N000017 HC ANESTHESIA TECHNICAL FEE, PER MIN: Performed by: INTERNAL MEDICINE

## 2022-02-21 PROCEDURE — 999N000141 HC STATISTIC PRE-PROCEDURE NURSING ASSESSMENT: Performed by: INTERNAL MEDICINE

## 2022-02-21 RX ORDER — AMOXICILLIN 250 MG
2 CAPSULE ORAL 2 TIMES DAILY
Qty: 60 TABLET | Refills: 0 | Status: ON HOLD | OUTPATIENT
Start: 2022-02-21 | End: 2022-01-01

## 2022-02-21 RX ORDER — ONDANSETRON 2 MG/ML
4 INJECTION INTRAMUSCULAR; INTRAVENOUS EVERY 30 MIN PRN
Status: DISCONTINUED | OUTPATIENT
Start: 2022-02-21 | End: 2022-02-21 | Stop reason: HOSPADM

## 2022-02-21 RX ORDER — LIDOCAINE 40 MG/G
CREAM TOPICAL
Status: DISCONTINUED | OUTPATIENT
Start: 2022-02-21 | End: 2022-02-21 | Stop reason: HOSPADM

## 2022-02-21 RX ORDER — NALOXONE HYDROCHLORIDE 1 MG/ML
0.4 INJECTION INTRAMUSCULAR; INTRAVENOUS; SUBCUTANEOUS
Status: CANCELLED | OUTPATIENT
Start: 2022-02-21

## 2022-02-21 RX ORDER — NALOXONE HYDROCHLORIDE 1 MG/ML
0.2 INJECTION INTRAMUSCULAR; INTRAVENOUS; SUBCUTANEOUS
Status: CANCELLED | OUTPATIENT
Start: 2022-02-21

## 2022-02-21 RX ORDER — SODIUM CHLORIDE, SODIUM LACTATE, POTASSIUM CHLORIDE, CALCIUM CHLORIDE 600; 310; 30; 20 MG/100ML; MG/100ML; MG/100ML; MG/100ML
INJECTION, SOLUTION INTRAVENOUS CONTINUOUS
Status: DISCONTINUED | OUTPATIENT
Start: 2022-02-21 | End: 2022-02-21 | Stop reason: HOSPADM

## 2022-02-21 RX ORDER — ONDANSETRON 4 MG/1
4 TABLET, ORALLY DISINTEGRATING ORAL EVERY 30 MIN PRN
Status: DISCONTINUED | OUTPATIENT
Start: 2022-02-21 | End: 2022-02-21 | Stop reason: HOSPADM

## 2022-02-21 RX ORDER — PANTOPRAZOLE SODIUM 40 MG/1
40 TABLET, DELAYED RELEASE ORAL
Qty: 30 TABLET | Refills: 0 | Status: ON HOLD | OUTPATIENT
Start: 2022-02-21 | End: 2022-01-01

## 2022-02-21 RX ORDER — PANTOPRAZOLE SODIUM 40 MG/1
40 TABLET, DELAYED RELEASE ORAL
Status: DISCONTINUED | OUTPATIENT
Start: 2022-02-21 | End: 2022-02-21 | Stop reason: HOSPADM

## 2022-02-21 RX ORDER — FENTANYL CITRATE 50 UG/ML
25 INJECTION, SOLUTION INTRAMUSCULAR; INTRAVENOUS EVERY 5 MIN PRN
Status: DISCONTINUED | OUTPATIENT
Start: 2022-02-21 | End: 2022-02-21 | Stop reason: HOSPADM

## 2022-02-21 RX ORDER — POLYETHYLENE GLYCOL 3350 17 G/17G
238 POWDER, FOR SOLUTION ORAL ONCE
Status: COMPLETED | OUTPATIENT
Start: 2022-02-21 | End: 2022-02-21

## 2022-02-21 RX ORDER — GLYCOPYRROLATE 0.2 MG/ML
INJECTION, SOLUTION INTRAMUSCULAR; INTRAVENOUS PRN
Status: DISCONTINUED | OUTPATIENT
Start: 2022-02-21 | End: 2022-02-21

## 2022-02-21 RX ORDER — KETAMINE HYDROCHLORIDE 10 MG/ML
INJECTION INTRAMUSCULAR; INTRAVENOUS PRN
Status: DISCONTINUED | OUTPATIENT
Start: 2022-02-21 | End: 2022-02-21

## 2022-02-21 RX ORDER — PROPOFOL 10 MG/ML
INJECTION, EMULSION INTRAVENOUS CONTINUOUS PRN
Status: DISCONTINUED | OUTPATIENT
Start: 2022-02-21 | End: 2022-02-21

## 2022-02-21 RX ORDER — POLYETHYLENE GLYCOL 3350 17 G/17G
17 POWDER, FOR SOLUTION ORAL 2 TIMES DAILY
Qty: 30 PACKET | Refills: 0 | Status: ON HOLD | OUTPATIENT
Start: 2022-02-21 | End: 2022-01-01

## 2022-02-21 RX ORDER — POLYETHYLENE GLYCOL 3350 17 G/17G
238 POWDER, FOR SOLUTION ORAL ONCE
Qty: 238 G | Refills: 0 | Status: SHIPPED | OUTPATIENT
Start: 2022-02-21 | End: 2022-02-21

## 2022-02-21 RX ORDER — FLUMAZENIL 0.1 MG/ML
0.2 INJECTION, SOLUTION INTRAVENOUS
Status: CANCELLED | OUTPATIENT
Start: 2022-02-21 | End: 2022-02-21

## 2022-02-21 RX ADMIN — PHENYLEPHRINE HYDROCHLORIDE 100 MCG: 10 INJECTION INTRAVENOUS at 10:18

## 2022-02-21 RX ADMIN — PHENYLEPHRINE HYDROCHLORIDE 100 MCG: 10 INJECTION INTRAVENOUS at 10:15

## 2022-02-21 RX ADMIN — PHENYLEPHRINE HYDROCHLORIDE 100 MCG: 10 INJECTION INTRAVENOUS at 10:12

## 2022-02-21 RX ADMIN — PHENYLEPHRINE HYDROCHLORIDE 200 MCG: 10 INJECTION INTRAVENOUS at 10:19

## 2022-02-21 RX ADMIN — PHENYLEPHRINE HYDROCHLORIDE 200 MCG: 10 INJECTION INTRAVENOUS at 10:24

## 2022-02-21 RX ADMIN — POLYETHYLENE GLYCOL 3350 238 G: 17 POWDER, FOR SOLUTION ORAL at 12:00

## 2022-02-21 RX ADMIN — DOCUSATE SODIUM 50 MG AND SENNOSIDES 8.6 MG 2 TABLET: 8.6; 5 TABLET, FILM COATED ORAL at 08:00

## 2022-02-21 RX ADMIN — GLYCOPYRROLATE 0.2 MG: 0.2 INJECTION, SOLUTION INTRAMUSCULAR; INTRAVENOUS at 09:51

## 2022-02-21 RX ADMIN — KETAMINE HYDROCHLORIDE 30 MG: 10 INJECTION, SOLUTION INTRAMUSCULAR; INTRAVENOUS at 09:51

## 2022-02-21 RX ADMIN — PROPOFOL 100 MCG/KG/MIN: 10 INJECTION, EMULSION INTRAVENOUS at 09:50

## 2022-02-21 RX ADMIN — SODIUM CHLORIDE, POTASSIUM CHLORIDE, SODIUM LACTATE AND CALCIUM CHLORIDE: 600; 310; 30; 20 INJECTION, SOLUTION INTRAVENOUS at 09:02

## 2022-02-21 RX ADMIN — TAMSULOSIN HYDROCHLORIDE 0.4 MG: 0.4 CAPSULE ORAL at 08:00

## 2022-02-21 ASSESSMENT — ENCOUNTER SYMPTOMS: DYSRHYTHMIAS: 1

## 2022-02-21 ASSESSMENT — COPD QUESTIONNAIRES: COPD: 1

## 2022-02-21 NOTE — PROGRESS NOTES
GENERAL PRE-PROCEDURE:   Procedure:  EGD and Flex Sig  Date/Time:  2/21/2022 9:26 AM    Verbal consent obtained?: Yes    Written consent obtained?: Yes    Risks and benefits: Risks, benefits and alternatives were discussed    Consent given by:  Patient  Patient states understanding of procedure being performed: Yes    Patient's understanding of procedure matches consent: Yes    Procedure consent matches procedure scheduled: Yes    Expected level of sedation:  Moderate  Appropriately NPO:  Yes  ASA Class:  3  Mallampati  :  Grade 1- soft palate, uvula, tonsillar pillars, and posterior pharyngeal wall visible  Lungs:  Lungs clear with good breath sounds bilaterally  Heart:  Normal heart sounds and rate  History & Physical reviewed:  History and physical reviewed and no updates needed  Statement of review:  I have reviewed the lab findings, diagnostic data, medications, and the plan for sedation

## 2022-02-21 NOTE — ANESTHESIA PREPROCEDURE EVALUATION
Anesthesia Pre-Procedure Evaluation    Patient: Billie Garcias   MRN: [id number] : 1932        Preoperative Diagnosis: Constipation, unspecified constipation type [K59.00]  Torres's esophagus without dysplasia [K22.70]    Procedure : Procedure(s):  ESOPHAGOGASTRODUODENOSCOPY (EGD)  SIGMOIDOSCOPY, FLEXIBLE          History reviewed. No pertinent past medical history.   Past Surgical History:   Procedure Laterality Date     HEMORRHOID SURGERY       [place] ESOPHAGOGASTRODUODENOSCOPY TRANSORAL DIAGNOSTIC N/A 10/21/2018    Procedure: ESOPHAGOGASTRODUODENOSCOPY (EGD) with biopsy;  Surgeon: Masoud Machuca MD;  Location: [facility];  Service: Gastroenterology     TONSILLECTOMY        Allergies   Allergen Reactions     Spironolactone Rash     patient broke out in very bad rash on both arms.       Social History     Tobacco Use     Smoking status: Former Smoker     Packs/day: 1.00     Years: 16.00     Pack years: 16.00     Types: Cigarettes, Cigarettes     Quit date: 1957     Years since quittin.1     Smokeless tobacco: Never Used   Substance Use Topics     Alcohol use: No      Wt Readings from Last 1 Encounters:   22 57.4 kg (126 lb 9.6 oz)        Anesthesia Evaluation   Pt has had prior anesthetic.     No history of anesthetic complications       ROS/MED HX  ENT/Pulmonary:     (+) COPD,     Neurologic:  - neg neurologic ROS     Cardiovascular:     (+) Dyslipidemia hypertension-----CHF (EF 30%) dysrhythmias (LBBB),     METS/Exercise Tolerance:     Hematologic:       Musculoskeletal:       GI/Hepatic: Comment: Thickening of GE junction on imaging - no obstructive symtpoms. NO n/v    Fecal impaction       Renal/Genitourinary:       Endo:  - neg endo ROS     Psychiatric/Substance Use:  - neg psychiatric ROS     Infectious Disease:       Malignancy:       Other:            Physical Exam    Airway        Mallampati: II   TM distance: > 3 FB   Neck ROM: full     Respiratory Devices and Support          Dental     Comment: Lower teeth poor dentition, multiple msising, chipped and loose    (+) chipped and upper dentures    B=Bridge, C=Chipped, L=Loose, M=Missing    Cardiovascular   cardiovascular exam normal          Pulmonary   pulmonary exam normal                OUTSIDE LABS:  CBC:   Lab Results   Component Value Date    WBC 7.2 02/21/2022    WBC 12.9 (H) 01/09/2022    HGB 11.4 (L) 02/21/2022    HGB 12.6 (L) 01/09/2022    HCT 34.8 (L) 02/21/2022    HCT 38.4 (L) 01/09/2022     02/21/2022     01/09/2022     BMP:   Lab Results   Component Value Date     02/21/2022     01/09/2022    POTASSIUM 4.1 02/21/2022    POTASSIUM 5.0 01/09/2022    CHLORIDE 106 02/21/2022    CHLORIDE 103 01/09/2022    CO2 25 02/21/2022    CO2 26 01/09/2022    BUN 24 02/21/2022    BUN 33 (H) 01/09/2022    CR 0.88 02/21/2022    CR 1.05 01/09/2022    GLC 93 02/21/2022    GLC 84 01/09/2022     COAGS:   Lab Results   Component Value Date    PTT 29 01/29/2019    INR 1.11 (H) 01/29/2019     POC: No results found for: BGM, HCG, HCGS  HEPATIC:   Lab Results   Component Value Date    ALBUMIN 3.5 01/09/2022    PROTTOTAL 6.4 01/09/2022    ALT <9 01/09/2022    AST 17 01/09/2022    ALKPHOS 64 01/09/2022    BILITOTAL 0.8 01/09/2022     OTHER:   Lab Results   Component Value Date    PH 7.39 10/17/2018    LACT 1.8 01/29/2019    GHAZAL 8.1 (L) 02/21/2022    PHOS 2.5 02/03/2019    MAG 2.0 01/09/2022    TSH 0.73 01/29/2019    CRP 3.6 (H) 01/29/2019    SED 14 10/18/2018       Anesthesia Plan    ASA Status:  3   NPO Status:  NPO Appropriate    Anesthesia Type: MAC.     - Reason for MAC: straight local not clinically adequate      Maintenance: TIVA.        Consents    Anesthesia Plan(s) and associated risks, benefits, and realistic alternatives discussed. Questions answered and patient/representative(s) expressed understanding.     - Discussed: Risks, Benefits and Alternatives for BOTH SEDATION and the PROCEDURE were discussed     -  Discussed with:  Patient         Postoperative Care       PONV prophylaxis: Ondansetron (or other 5HT-3), Background Propofol Infusion     Comments:    Other Comments: Plan for low dose propofol gtt w/ ketamine PRN  Discussed potential need to convert to GA w/ ETT  if not tolerating.  Explained that it is possible to remember certain aspects of the OR experience during MAC dependent on the depth of sedation and patient understands this.  zofran for PONV ppx.            Keara Piña MD

## 2022-02-21 NOTE — DISCHARGE SUMMARY
Physician Discharge Summary        Primary Care Physician:  Paramjit Christian Admission Date: 2/19/2022   Discharge Provider: Ced Null DO Discharge Date: 2/21/2022   Disposition: home Code Status: Full Code   Activity: as tolerated.  Diet: Orders Placed This Encounter      Regular Diet Adult      Diet        Condition at Discharge: stable.       Discharge Diagnoses/Hospital Summary:      Billei Garcias is a 89 year old male with history of chronic heart failure with reduced ejection fraction, left bundle branch block, dilated cardiomyopathy, who presents for evaluation of 3 weeks of constipation. Admitted for fecal impaction.     Fecal impaction: CT scan outside hospital on 2/18 shows marked distention of the rectum and sigmoid which is full of stool and the bladder was displaced. Unsuccessful enema and digital disimpaction in ER.  - Bowel regimen.   - Gastrografin enema completed.   - EGD and flex sig completed, recommendations for PPI due to long segment diallo's, see report for further details.  - GI follow up as directed by their service.   - Close outpatient follow up.       Abnormal CT finding: CT reveals wall thickening at the GE junction maybe nonspecific, but neoplasm can't rule out.   - GI consulted during inpatient stay.      Chronic heart failure with reduced ejection fraction, left bundle branch block, dilated cardiomyopathy: stable without active issues  - Continue PTA digoxin and lisinopril     BPH: continue PTA flomax    Discharge Medications:      Review of your medicines      START taking      Dose / Directions   pantoprazole 40 MG EC tablet  Commonly known as: PROTONIX      Dose: 40 mg  Take 1 tablet (40 mg) by mouth every morning (before breakfast)  Quantity: 30 tablet  Refills: 0     * polyethylene glycol 17 g packet  Commonly known as: MIRALAX      Dose: 17 g  Take 17 g by mouth 2 times daily  Quantity: 30 packet  Refills: 0     * polyethylene glycol 17 GM/Dose powder  Commonly  known as: MIRALAX      Dose: 238 g  Take 238 g by mouth once for 1 dose  Quantity: 238 g  Refills: 0     senna-docusate 8.6-50 MG tablet  Commonly known as: SENOKOT-S/PERICOLACE      Dose: 2 tablet  Take 2 tablets by mouth 2 times daily  Quantity: 60 tablet  Refills: 0         * This list has 2 medication(s) that are the same as other medications prescribed for you. Read the directions carefully, and ask your doctor or other care provider to review them with you.            CONTINUE these medicines which have NOT CHANGED      Dose / Directions   digoxin 125 MCG tablet  Commonly known as: LANOXIN  Used for: Dilated cardiomyopathy (H)      Dose: 125 mcg  [DIGOXIN (LANOXIN) 125 MCG TABLET] Take 1 tablet (125 mcg total) by mouth daily with supper.  Quantity: 90 tablet  Refills: 1     fluticasone-salmeterol 250-50 MCG/DOSE inhaler  Commonly known as: ADVAIR      Dose: 1 puff  Inhale 1 puff into the lungs 2 times daily  Refills: 0     lisinopril 5 MG tablet  Commonly known as: ZESTRIL  Used for: Dilated cardiomyopathy (H)      Dose: 5 mg  [LISINOPRIL (PRINIVIL,ZESTRIL) 5 MG TABLET] Take 1 tablet (5 mg total) by mouth at bedtime.  Quantity: 90 tablet  Refills: 3     tamsulosin 0.4 MG capsule  Commonly known as: FLOMAX  Used for: Urinary retention      Dose: 0.4 mg  [TAMSULOSIN (FLOMAX) 0.4 MG CAP] Take 1 capsule (0.4 mg total) by mouth daily.  Quantity: 30 capsule  Refills: 2           Where to get your medicines      These medications were sent to [id number] - [place], [place] - [address]  [address], [place] [address]    Phone: [phone]     pantoprazole 40 MG EC tablet    polyethylene glycol 17 g packet    polyethylene glycol 17 GM/Dose powder    senna-docusate 8.6-50 MG tablet               Discharge Instructions:  Follow up appointment with Primary Care Physician: Paramjit Christian  Follow up appointment with Specialist: Gastroenterology as  directed.        Vital Signs in last 24 hours:    Temp:  [97.3  F (36.3  C)-98.2  F (36.8  C)] 98.2  F (36.8  C)  Pulse:  [53-75] 63  Resp:  [16-24] 20  BP: (101-151)/(60-80) 149/80  SpO2:  [96 %-100 %] 100 %    GENERAL: NAD.   HEENT: Normocephalic. Nares normal.   LUNGS: No dyspnea at rest.   HEART: Extremities perfused.   EXTREMITIES: No LE edema noted.   NEUROLOGIC: Moves extremities x4 spontaneously.     Total Time on discharge process is: 31 minutes    Dr. Ced Null DO, MBA  Internal Medicine Hospitalist  2/21/2022

## 2022-02-21 NOTE — PROGRESS NOTES
"[facility]  Hospitalist Progress Note  [facility]        Date of Service (when I saw the patient): 2022  Ced Null DO  2022        Interval History:      Patient with plans for flex sig today.          Assessment and Plan:        Billie Garcias is a 89 year old male with history of chronic heart failure with reduced ejection fraction, left bundle branch block, dilated cardiomyopathy, who presents for evaluation of 3 weeks of constipation. Admitted for fecal impaction.     Fecal impaction: CT scan outside hospital on  shows marked distention of the rectum and sigmoid which is full of stool and the bladder was displaced. Unsuccessful enema and digital disimpaction in ER.  - Bowel regimen.   - Gastrografin enema completed.   - EGD and flex sig planned.   - GI following.      Abnormal CT finding: CT reveals wall thickening at the GE junction maybe nonspecific, but neoplasm can't rule out.   - GI consulted     Chronic heart failure with reduced ejection fraction, left bundle branch block, dilated cardiomyopathy: stable without active issues  - Continue PTA digoxin and lisinopril     BPH: continue PTA flomax     DVT Prophylaxis: Low Risk/Ambulatory with no VTE prophylaxis indicated     Code: Full Code     Anticipated discharge date: Today or tomorrow.   Milestones/needs for discharge: EGD, flex sig.   Pending tests or labs: EGD, flex sig.   Text page via [employer] Paging/Directory                   Physical Exam:           Blood pressure 139/61, pulse 56, temperature 97.8  F (36.6  C), temperature source Oral, resp. rate 19, height 1.753 m (5' 9\"), weight 57.4 kg (126 lb 9.6 oz), SpO2 97 %.    Vital Sign Ranges  Temperature Temp  Av.7  F (36.5  C)  Min: 97.3  F (36.3  C)  Max: 98.1  F (36.7  C)   Blood pressure Systolic (24hrs), Av , Min:110 , Max:139        Diastolic (24hrs), Av, Min:61, Max:72      Pulse Pulse  Av.3  Min: 56  Max: 75 "   Respirations Resp  Av.8  Min: 18  Max: 20   Pulse oximetry SpO2  Av %  Min: 96 %  Max: 98 %     Vital Signs with Ranges  Temp:  [97.3  F (36.3  C)-98.1  F (36.7  C)] 97.8  F (36.6  C)  Pulse:  [56-75] 56  Resp:  [18-20] 19  BP: (110-139)/(61-72) 139/61  SpO2:  [96 %-98 %] 97 %  Temp:  [97.3  F (36.3  C)-98.1  F (36.7  C)] 97.8  F (36.6  C)  Pulse:  [56-75] 56  Resp:  [18-20] 19  BP: (110-139)/(61-72) 139/61  SpO2:  [96 %-98 %] 97 %    I/O Last 3 Shifts:   No intake/output data recorded.    I/O past 24 hours:   No intake or output data in the 24 hours ending 22 0740    Oxygen  Temp: 97.8  F (36.6  C) Temp src: Oral BP: 139/61 Pulse: 56   Resp: 19 SpO2: 97 % O2 Device: None (Room air)    Vitals:    22 1734   Weight: 57.4 kg (126 lb 9.6 oz)       Lines     GENERAL: NAD.   HEENT: Normocephalic. Nares normal.   LUNGS: No dyspnea at rest.   HEART: Extremities perfused.   EXTREMITIES: No LE edema noted.   NEUROLOGIC: Moves extremities x4 spontaneously.          Prior to Admission Medications:        Medications Prior to Admission   Medication Sig Dispense Refill Last Dose     digoxin (LANOXIN) 125 mcg tablet [DIGOXIN (LANOXIN) 125 MCG TABLET] Take 1 tablet (125 mcg total) by mouth daily with supper. 90 tablet 1 2022 at pm     fluticasone-salmeterol (ADVAIR) 250-50 MCG/DOSE inhaler Inhale 1 puff into the lungs 2 times daily   2022 at am     lisinopril (PRINIVIL,ZESTRIL) 5 MG tablet [LISINOPRIL (PRINIVIL,ZESTRIL) 5 MG TABLET] Take 1 tablet (5 mg total) by mouth at bedtime. 90 tablet 3 2022 at hs     tamsulosin (FLOMAX) 0.4 mg cap [TAMSULOSIN (FLOMAX) 0.4 MG CAP] Take 1 capsule (0.4 mg total) by mouth daily. 30 capsule 2 2022 at am            Medications:        Current Facility-Administered Medications   Medication Last Rate     Current Facility-Administered Medications   Medication Dose Route Frequency     digoxin  125 mcg Oral Daily with supper     lisinopril  5 mg Oral At  Bedtime     polyethylene glycol  17 g Oral BID     senna-docusate  2 tablet Oral BID     tamsulosin  0.4 mg Oral Daily     Current Facility-Administered Medications   Medication Dose Route Frequency     melatonin  1 mg Oral At Bedtime PRN     ondansetron  4 mg Oral Q6H PRN    Or     ondansetron  4 mg Intravenous Q6H PRN     ___________________________________________________________________________  Medications       digoxin  125 mcg Oral Daily with supper     lisinopril  5 mg Oral At Bedtime     polyethylene glycol  17 g Oral BID     senna-docusate  2 tablet Oral BID     tamsulosin  0.4 mg Oral Daily          Data:      Lab data reviewed.     Data   Recent Labs   Lab 02/21/22  0709   WBC 7.2   HGB 11.4*                 Imaging:      Imaging data reviewed.     Recent Results (from the past 24 hour(s))   XR Colon Water Soluble Diagnostic    Narrative    [facility]  XR COLON WATER SOLUBLE DIAGNOSTIC  2/20/2022 11:25 AM    INDICATION: Constipation. Stool impaction.  COMPARISON: None.      Impression    IMPRESSION: Stool impaction in the rectosigmoid colon. Contrast makes it past this region of the colon but given the redundant prominent: Unable to make the contrast all the way to the cecum. Contrast was flushed in and out of the colon proximally 15   times.    Stool impaction rectosigmoid colon without obstruction. Redundant distended colon.       Dr. Ced Null DO, INEZ, A  [facility] Hospitalist  Pager [phone]  Text page via [facility] Paging/Directory

## 2022-02-21 NOTE — ANESTHESIA POSTPROCEDURE EVALUATION
Patient: Billie Garcias    Procedure: Procedure(s):  ESOPHAGOGASTRODUODENOSCOPY (EGD) WITH BIOPSIES  SIGMOIDOSCOPY, FLEXIBLE       Anesthesia Type:  MAC    Note:  Disposition: Inpatient   Postop Pain Control: Uneventful            Sign Out: Well controlled pain   PONV: No   Neuro/Psych: Uneventful            Sign Out: Acceptable/Baseline neuro status   Airway/Respiratory: Uneventful            Sign Out: Acceptable/Baseline resp. status   CV/Hemodynamics: Uneventful            Sign Out: Acceptable CV status; No obvious hypovolemia; No obvious fluid overload   Other NRE: NONE   DID A NON-ROUTINE EVENT OCCUR?            Last vitals:  Vitals Value Taken Time   /70 02/21/22 1100   Temp     Pulse 61 02/21/22 1110   Resp 24 02/21/22 1110   SpO2 100 % 02/21/22 1110       Electronically Signed By: Keara Piña MD  February 21, 2022  12:12 PM

## 2022-02-21 NOTE — H&P
"ENDOSCOPY PRE-PROCEDURE HISTORY & PHYSICAL    CHIEF COMPLAINT / REASON FOR PROCEDURE:  Abnormal imaging of esophagus, hx of Torres's esophagus, fecal impaction.     PERTINENT HISTORY   History reviewed. No pertinent past medical history.  Past Surgical History:   Procedure Laterality Date     HEMORRHOID SURGERY  1951     [place] ESOPHAGOGASTRODUODENOSCOPY TRANSORAL DIAGNOSTIC N/A 10/21/2018    Procedure: ESOPHAGOGASTRODUODENOSCOPY (EGD) with biopsy;  Surgeon: Masoud Machuca MD;  Location: [facility];  Service: Gastroenterology     TONSILLECTOMY  1943     Other:  None  Bleeding tendencies:  None    Relevant Family History:  None    Relevant Social History:  None    A relevant Review of Systems was performed and was negative. No dysphagia, odynophagia. No abd pain. .    ALLERGIES/SENSITIVITIES:   Allergies   Allergen Reactions     Spironolactone Rash     patient broke out in very bad rash on both arms.         CURRENT MEDICATIONS:     Medications Prior to Admission   Medication Sig Dispense Refill Last Dose     digoxin (LANOXIN) 125 mcg tablet [DIGOXIN (LANOXIN) 125 MCG TABLET] Take 1 tablet (125 mcg total) by mouth daily with supper. 90 tablet 1 2/18/2022 at pm     fluticasone-salmeterol (ADVAIR) 250-50 MCG/DOSE inhaler Inhale 1 puff into the lungs 2 times daily   2/19/2022 at am     lisinopril (PRINIVIL,ZESTRIL) 5 MG tablet [LISINOPRIL (PRINIVIL,ZESTRIL) 5 MG TABLET] Take 1 tablet (5 mg total) by mouth at bedtime. 90 tablet 3 2/18/2022 at hs     tamsulosin (FLOMAX) 0.4 mg cap [TAMSULOSIN (FLOMAX) 0.4 MG CAP] Take 1 capsule (0.4 mg total) by mouth daily. 30 capsule 2 2/19/2022 at am           LABORATORY:   I reviewed the pertinent lab results.    RELEVANT EXAM:     BP (!) 151/70 (BP Location: Right arm)   Pulse 53   Temp 97.7  F (36.5  C) (Oral)   Resp 16   Ht 1.753 m (5' 9\")   Wt 57.4 kg (126 lb 9.6 oz)   SpO2 100%   BMI 18.70 kg/m     Lung Exam:   Normal  Heart Exam:  Normal  Airway Exam:  Normal  Mental " Status:  Patient understands indications, risks and benefits and wishes to proceed with EGD and Flex Sig.  Mallampati:1   Previous reaction to anesthesia/sedation:   None  Sedation plan based on assessment:  Moderate  Comment(s):  None    Consent signed by:  The patient    ASA Classification:  3    IMPRESSION:      Hx of Torres's, abnormal CT imaging of esophagus, Fecal impaction.     PLAN:      EGD and Flex Sig.     Daniel Guzman DO  [facility]  [phone]

## 2022-02-21 NOTE — ANESTHESIA CARE TRANSFER NOTE
Patient: Billie Garcias    Procedure: Procedure(s):  ESOPHAGOGASTRODUODENOSCOPY (EGD) WITH BIOPSIES  SIGMOIDOSCOPY, FLEXIBLE       Diagnosis: Constipation, unspecified constipation type [K59.00]  Torres's esophagus without dysplasia [K22.70]  Diagnosis Additional Information: No value filed.    Anesthesia Type:   MAC     Note:    Oropharynx: oropharynx clear of all foreign objects  Level of Consciousness: drowsy  Oxygen Supplementation: face mask  Level of Supplemental Oxygen (L/min / FiO2): 6  Independent Airway: airway patency satisfactory and stable  Dentition: dentition unchanged  Vital Signs Stable: post-procedure vital signs reviewed and stable  Report to RN Given: handoff report given  Patient transferred to: PACU    Handoff Report: Identifed the Patient, Identified the Reponsible Provider, Reviewed the pertinent medical history, Discussed the surgical course, Reviewed Intra-OP anesthesia mangement and issues during anesthesia, Set expectations for post-procedure period and Allowed opportunity for questions and acknowledgement of understanding      Vitals:  Vitals Value Taken Time   /60 02/21/22 1040   Temp 97.3 f      Pulse 56 02/21/22 1041   Resp 23 02/21/22 1041   SpO2 100 % 02/21/22 1041   Vitals shown include unvalidated device data.    Electronically Signed By: CARLOS Tony CRNA  February 21, 2022  10:42 AM

## 2022-02-21 NOTE — PLAN OF CARE
Problem: Constipation  Goal: Effective Bowel Elimination  Outcome: Ongoing, Progressing     Patient began passing large amounts of formed to soft stool early this AM. He is having incontinence issues and is attempting to transfer to bathroom without staff assist. Bed alarm in use and frequent checks utilized. Denies pain.    Alton Méndez RN

## 2022-02-24 LAB
PATH REPORT.ADDENDUM SPEC: NORMAL
PATH REPORT.COMMENTS IMP SPEC: NORMAL
PATH REPORT.FINAL DX SPEC: NORMAL
PATH REPORT.GROSS SPEC: NORMAL
PATH REPORT.MICROSCOPIC SPEC OTHER STN: NORMAL
PATH REPORT.RELEVANT HX SPEC: NORMAL
PHOTO IMAGE: NORMAL

## 2022-02-24 PROCEDURE — 88305 TISSUE EXAM BY PATHOLOGIST: CPT | Mod: 26 | Performed by: PATHOLOGY

## 2022-12-14 PROBLEM — R00.1 SINUS BRADYCARDIA: Status: ACTIVE | Noted: 2022-01-01

## 2022-12-14 PROBLEM — R19.7 DIARRHEA, UNSPECIFIED TYPE: Status: ACTIVE | Noted: 2022-01-01

## 2022-12-14 NOTE — Clinical Note
Site: [facility]  Type:  [facility]   CLAUDE [employer] Serial Number:FX SN [id number]

## 2022-12-14 NOTE — Clinical Note
Call Center TCM Note      Responses   [facility] patient discharged from?  [place]   Generator attached. [email or web address] [facility] [employer] Model L331, SN [id number], Lot# [id number], Exp date 2024-11-19

## 2022-12-14 NOTE — CONSULTS
Care Management Initial Consult    General Information  Assessment completed with: Patient, wife Louann       Primary Care Provider verified and updated as needed:     Readmission within the last 30 days:        Reason for Consult: discharge planning  Advance Care Planning:            Communication Assessment  Patient's communication style:               Cognitive  Cognitive/Neuro/Behavioral: WDL        Orientation: person, place, situation             Living Environment:   People in home: spouse     Current living Arrangements: house      Able to return to prior arrangements:         Family/Social Support:  Care provided by: spouse/significant other  Provides care for: no one, unable/limited ability to care for self  Marital Status:   , Children          Description of Support System: Supportive    Support Assessment: Caregiver difficulty providing physical care/safety    Current Resources:   Patient receiving home care services: No     Community Resources: None  Equipment currently used at home: none  Supplies currently used at home: Incontinence Supplies    Employment/Financial:  Employment Status: retired        Financial Concerns:   none          Lifestyle & Psychosocial Needs:  Social Determinants of Health     Tobacco Use: Medium Risk     Smoking Tobacco Use: Former     Smokeless Tobacco Use: Never     Passive Exposure: Not on file   Alcohol Use: Not on file   Financial Resource Strain: Not on file   Food Insecurity: Not on file   Transportation Needs: Not on file   Physical Activity: Not on file   Stress: Not on file   Social Connections: Not on file   Intimate Partner Violence: Not on file   Depression: Not on file   Housing Stability: Not on file       Functional Status:  Prior to admission patient needed assistance:   Dependent ADLs:: Dressing, Bathing, Ambulation-no assistive device, Grooming, Incontinence, Positioning, Transfers, Toileting  Dependent IADLs:: Cleaning, Cooking, Laundry,  "Shopping, Meal Preparation, Medication Management, Money Management, Transportation, Incontinence       Mental Health Status:  Mental Health Status: No Current Concerns       Chemical Dependency Status:  Chemical Dependency Status: No Current Concerns             Values/Beliefs:  Spiritual, Cultural Beliefs, Taoism Practices, Values that affect care:                 Additional Information:  Chart reviewed - SW spoke to patient's wife on the phone to complete initial assessment. Patient lives with his wife. Per wife she is his primary caregiver. She describes some of their daily routine as she cares for him and states that much of his day is now spent napping/ sleeping. Patient has not been eating much. She reports he typically does not use an assistive device to walk.   Wife describes their relationship and states \"he is like a patient to me, I dont know him anymore\" in relation to his dementia. Wife reports her oldest daughter does provide some support with the house work.   SW discussed options for discharge and possibly adding additional caregiver support or private duty homecare for wife in an effort to keep herself well and reduce burnout. Wife states she has looked in to this. She reports she does intend to continue to care for him as able.   Care management will follow for discharge planning assist.      Neli Ag, [facility]        "

## 2022-12-14 NOTE — ED PROVIDER NOTES
EMERGENCY DEPARTMENT ENCOUNTER      NAME: Billie Garcias  AGE: 89 year old male  YOB: 1932  MRN: [id number]  EVALUATION DATE & TIME: 2022  1:58 PM    PCP: Paramjit Christian    ED PROVIDER: Tyler Montelongo M.D.      Chief Complaint   Patient presents with     Generalized Weakness     Diarrhea         FINAL IMPRESSION:  1.  Bradycardia.  2.  Generalized weakness.  3.  Diarrhea.      ED COURSE & MEDICAL DECISION MAKIN:25 PM I met with the patient to gather history and to perform my initial exam. We discussed plans for the ED course, including diagnostic testing and treatment. PPE worn: cloth mask  6:32 PM I rechecked the patient and updated them on laboratory results. Call placed to admitting service.  EKG was sinus bradycardia.  Digoxin 0.6.  Chemistries negative other than BUN 27.  BNP elevated 885.  Troponin 56 and rechecked at 62.  CBC unremarkable and white count 14.1.  D-dimer 0.70 which is normal for age.  Flu and COVID testing negative.  Plan admit patient to cardiac telemetry inpatient unit for diarrhea, general weakness, mild change in troponins and bradycardia.  This could be secondary to his medications which include digoxin or lisinopril.  This would be more common however in beta-blocker usage.  Patient agreement with the plan we will page the admitting service.  6:42 PM.  Hospitalist called back and is in agreement with the plan.    Pertinent Labs & Imaging studies reviewed. (See chart for details)  89 year old male presents to the Emergency Department for evaluation of diarrhea and generalized weakness.    At the conclusion of the encounter I discussed the results of all of the tests and the disposition. The questions were answered. The patient or family acknowledged understanding and was agreeable with the care plan.       Medical Decision Making    History:    Supplemental history from: EMS    External Record(s) reviewed: Inpatient Record: Inpatient computer records  reviewed.    Work Up:    Chart documentation includes differential considered and any EKGs or imaging interpreted by provider.  Differential diagnosis includes digoxin toxicity, other medications causing bradycardia, primary arrhythmia, electrolyte abnormalities, dehydration, etc.    In additional to work up documented, I considered the following work up: See chart documentation, if applicable.    External consultation:    Discussion of management with another provider: I suspect we will end up talking with the admitting hospitalist after evaluation.    Complicating factors:    Care impacted by chronic illness: None    Care affected by social determinants of health: N/A    Disposition considerations: Probable admission given bradycardia.      MEDICATIONS GIVEN IN THE EMERGENCY:  Medications   0.9% sodium chloride BOLUS (0 mLs Intravenous Stopped 12/14/22 1620)       NEW PRESCRIPTIONS STARTED AT TODAY'S ER VISIT  New Prescriptions    No medications on file          =================================================================    HPI    Patient information was obtained from: Patient    Use of : N/A        Billie Garcias is a 89 year old male with a pertinent history of Alzheimer's disease, COPD, and heart failure who presents to this ED via EMS for evaluation of diarrhea and generalized weakness.    The patient reports he has been having diarrhea and feels generally weak. He has been having 5-6 episodes of diarrhea per day. The stool is nonbloody. He has not had any associated nausea or vomiting    He does not identify any waxing or waning symptoms otherwise, exacerbating or alleviating features, associated symptoms except as mentioned. He denies any pain related complaints.    REVIEW OF SYSTEMS   Review of Systems   Gastrointestinal: Positive for diarrhea. Negative for blood in stool, nausea and vomiting.   Neurological: Positive for weakness (generalized).   All other systems reviewed and are  negative.       PAST MEDICAL HISTORY:  History reviewed. No pertinent past medical history.    PAST SURGICAL HISTORY:  Past Surgical History:   Procedure Laterality Date     ESOPHAGOSCOPY, GASTROSCOPY, DUODENOSCOPY (EGD), COMBINED N/A 2022    Procedure: ESOPHAGOGASTRODUODENOSCOPY (EGD) WITH BIOPSIES;  Surgeon: Daniel Guzman DO;  Location: [facility] OR     HEMORRHOID SURGERY       DE ESOPHAGOGASTRODUODENOSCOPY TRANSORAL DIAGNOSTIC N/A 10/21/2018    Procedure: ESOPHAGOGASTRODUODENOSCOPY (EGD) with biopsy;  Surgeon: Masoud Machuca MD;  Location: [facility];  Service: Gastroenterology     SIGMOIDOSCOPY FLEXIBLE N/A 2022    Procedure: SIGMOIDOSCOPY, FLEXIBLE;  Surgeon: Daniel Guzman DO;  Location: [facility] OR     TONSILLECTOMY             CURRENT MEDICATIONS:    digoxin (LANOXIN) 125 mcg tablet  fluticasone-salmeterol (ADVAIR) 250-50 MCG/DOSE inhaler  lisinopril (PRINIVIL,ZESTRIL) 5 MG tablet  pantoprazole (PROTONIX) 40 MG EC tablet  polyethylene glycol (MIRALAX) 17 g packet  senna-docusate (SENOKOT-S/PERICOLACE) 8.6-50 MG tablet  tamsulosin (FLOMAX) 0.4 mg cap        ALLERGIES:  Allergies   Allergen Reactions     Spironolactone Rash     patient broke out in very bad rash on both arms.        FAMILY HISTORY:  Family History   Problem Relation Age of Onset     Cancer Mother      Other - See Comments Father 35.00         in a car accident     Diabetes Type 2  Brother      No Known Problems Brother      Cerebrovascular Disease Sister      No Known Problems Sister      Cerebrovascular Disease Sister      No Known Problems Daughter      No Known Problems Daughter      CABG Brother        SOCIAL HISTORY:   Social History     Socioeconomic History     Marital status:      Spouse name: None     Number of children: 2     Years of education: None     Highest education level: None   Tobacco Use     Smoking status: Former     Packs/day: 1.00     Years: 16.00     Pack years: 16.00      "Types: Cigarettes     Quit date: 1957     Years since quittin.9     Smokeless tobacco: Never   Substance and Sexual Activity     Alcohol use: No     Drug use: No     Sexual activity: Not Currently     Partners: Female   Social History Narrative    He lives with his wife.   No current drugs, alcohol, or tobacco.  Former smoker.    VITALS:  BP (!) 161/69   Pulse (!) 36   Temp (!) 96.3  F (35.7  C) (Axillary)   Resp 14   Ht 1.854 m (6' 1\")   Wt 72.6 kg (160 lb)   SpO2 100%   BMI 21.11 kg/m      PHYSICAL EXAM    Vital Signs:  BP (!) 161/69   Pulse (!) 36   Temp (!) 96.3  F (35.7  C) (Axillary)   Resp 14   Ht 1.854 m (6' 1\")   Wt 72.6 kg (160 lb)   SpO2 100%   BMI 21.11 kg/m    General:  On entering the room is in no apparent distress.    Neck:  Neck supple with full range of motion and nontender.    Back:  Back and spine are nontender.  No costovertebral angle tenderness.    HEENT:  Oropharynx clear with moist mucous membranes.  HEENT unremarkable.    Pulmonary:  Chest clear to auscultation without rhonchi rales or wheezing.    Cardiovascular:  Cardiac slow regular rate and rhythm without murmurs rubs or gallops.    Abdomen:  Abdomen soft nontender.  There is no rebound or guarding.    Muskuloskeletal:  he moves all 4 without any difficulty and has normal neurovascular exams.  Extremities without clubbing, cyanosis, or edema.  Legs and calves are nontender.    Neuro:  he is alert and oriented ×3 and moves all extremities symmetrically.    Psych:  Normal affect.    Skin:  Unremarkable and warm and dry.       LAB:  All pertinent labs reviewed and interpreted.  Labs Ordered and Resulted from Time of ED Arrival to Time of ED Departure   BASIC METABOLIC PANEL - Abnormal       Result Value    Sodium 133 (*)     Potassium 4.9      Chloride 98      Carbon Dioxide (CO2) 24      Anion Gap 11      Urea Nitrogen 27.3 (*)     Creatinine 0.94      Calcium 8.8      Glucose 104 (*)     GFR Estimate 77   "   TROPONIN T, HIGH SENSITIVITY - Abnormal    Troponin T, High Sensitivity 56 (*)    D DIMER QUANTITATIVE - Abnormal    D-Dimer Quantitative 0.70 (*)    CBC WITH PLATELETS AND DIFFERENTIAL - Abnormal    WBC Count 14.1 (*)     RBC Count 3.93 (*)     Hemoglobin 12.8 (*)     Hematocrit 39.2 (*)           MCH 32.6      MCHC 32.7      RDW 12.5      Platelet Count 283      % Neutrophils 83      % Lymphocytes 9      % Monocytes 4      % Eosinophils 1      % Basophils 1      % Immature Granulocytes 2      NRBCs per 100 WBC 0      Absolute Neutrophils 12.1 (*)     Absolute Lymphocytes 1.3      Absolute Monocytes 0.6      Absolute Eosinophils 0.2      Absolute Basophils 0.1      Absolute Immature Granulocytes 0.2      Absolute NRBCs 0.0     TROPONIN T, HIGH SENSITIVITY - Abnormal    Troponin T, High Sensitivity 62 (*)    DIGOXIN LEVEL - Normal    Digoxin 0.6     NT PROBNP INPATIENT - Normal    N terminal Pro BNP Inpatient 885     INFLUENZA A/B & SARS-COV2 PCR MULTIPLEX - Normal    Influenza A PCR Negative      Influenza B PCR Negative      RSV PCR Negative      SARS CoV2 PCR Negative         RADIOLOGY:  Reviewed all pertinent imaging. Please see official radiology report.  No orders to display              EKG:    Sinus bradycardia 37, first-degree AV block, left axis deviation, bundle branch block.  No previous shown me for comparison.    I have independently reviewed and interpreted the EKG(s) documented above.    PROCEDURES:         I, Boo Adair, am serving as a scribe to document services personally performed by Dr. Montelongo based on my observation and the provider's statements to me. I, Tyler Montelongo MD attest that Boo Adair is acting in a scribe capacity, has observed my performance of the services and has documented them in accordance with my direction.    Tyler Montelongo M.D.  Emergency Medicine  [facility] EMERGENCY DEPARTMENT  [address]  [facility] [address]  [phone]  Dept: [phone]       Tylre Montelongo MD  12/14/22 1836       Tyler Montelongo MD  12/14/22 7089

## 2022-12-14 NOTE — ED NOTES
[facility]  Expected date:   Expected time:   Means of arrival: Ambulance  Comments:  SPF: Weak, diarrhea

## 2022-12-14 NOTE — Clinical Note
Tested the right ventricular lead. See vendor printout/MD dictation. siOPTICA+ 52cm, Ref# [id number], SN [id number], Exp date 2024-10-27

## 2022-12-14 NOTE — ED NOTES
Pt in ed hw bed with initial c/o weakness and diarrhea, noted that pt hr down to 33 on monitor. Pt moved to ed room 2, monitors in place, external pacer pads and 02 2l nc applied, pt denies any c/p or sob, hr noted to go down to 28, will continue to monitor.

## 2022-12-14 NOTE — ED TRIAGE NOTES
Patient brought by medic c/o diarrhea for 2 days and weakness. Blood sugar per medic 101.  Family called medic.  Hx: Alzhemeiestrella     Triage Assessment     Row Name 12/14/22 8194       Triage Assessment (Adult)    Airway WDL WDL       Respiratory WDL    Respiratory WDL WDL       Skin Circulation/Temperature WDL    Skin Circulation/Temperature WDL temperature    Skin Temperature cool       Cardiac WDL    Cardiac WDL WDL       Peripheral/Neurovascular WDL    Peripheral Neurovascular WDL WDL       Cognitive/Neuro/Behavioral WDL    Cognitive/Neuro/Behavioral WDL orientation    Orientation place;time;situation

## 2022-12-14 NOTE — Clinical Note
Tested the atrial lead. See vendor printout/MD dictation. Setred+ 45cm, Ref # [id number], Exp date 2024-05-28

## 2022-12-15 NOTE — H&P
[facility]    History and Physical - Hospitalist Service       Date of Admission:  12/14/2022    Assessment & Plan      Billie Garcias is a 89 year old male with a medical history of Alzheimer's disease, COPD, and heart failure who presents to the ED for evaluation of diarrhea and generalized weakness.     Sinus bradycardia, cardiomyopathy, HFrEF: With first-degree AV block.  Patient had normal sinus rhythm in Oct 2019.  Currently he is hemodynamically stable. Patient reports that he fainted today, which was not reported by EMS and needs to confirm with family. He does have mildly elevated troponin.  Patient has history of cardiomyopathy and CHF.  Last echo in Oct 2020 showed LVEF 30%.  He is chronically on digoxin.  Digoxin level on admission is borderline low.  ProBNP is normal.  - Telemetry  - Trend troponin  - Echocardiogram  - Hold digoxin due to bradycardia  - Cardiology consult    Diarrhea: Will check C diff and enteric pathogen. Gentle IVF.     Hyponatremia: Na = 133 on admission, will monitor as appropriate         Essential hypertension: Continue PTA lisinopril.    COPD: Not in exacerbation.  Continue home inhaler.    Generalized weakness: PT/OT       Diet:  Regular diet  DVT Prophylaxis: Enoxaparin (Lovenox) SQ  Dejesus Catheter: Not present  Central Lines: None  Cardiac Monitoring: None  Code Status: No CPR- Do NOT Intubate        Disposition Plan      Expected Discharge Date: 12/16/2022                The patient's care was discussed with the Bedside Nurse, Care Coordinator/ and Patient.    Rae Rene MD  Hospitalist Service  [facility]  Securely message with the Vocera Web Console (learn more here)  Text page via [employer] Paging/Directory         ______________________________________________________________________    Chief Complaint   Diarrhea and generalized weakness    History of Present Illness   Billie Garcias is a 89 year old  male with a medical history of Alzheimer's disease, COPD, and heart failure who presents to the ED for evaluation of diarrhea and generalized weakness. Patient reports that he fainted this morning when he got up from bed. His wife called EMS. He reports that he has been having diarrhea and feels generally weak. He has been having 3-4 episodes of diarrhea per day. The stool is nonbloody. He reports no associated nausea, vomiting and abdomina pain. He is tto weak to walk with his walker. No fever, cough, SOB and chest pain. In ER, lab test revealed mildly elevated troponin. He has bradycardia with HR at the 30s. Patient does not know the baseline of his heart rate.    Review of Systems    The 10 point Review of Systems is negative other than noted in the HPI or here.     Past Medical History    I have reviewed this patient's medical history and updated it with pertinent information if needed.   Alzheimer's disease, COPD, and heart failure    Past Surgical History   I have reviewed this patient's surgical history and updated it with pertinent information if needed.  Past Surgical History:   Procedure Laterality Date     ESOPHAGOSCOPY, GASTROSCOPY, DUODENOSCOPY (EGD), COMBINED N/A 2/21/2022    Procedure: ESOPHAGOGASTRODUODENOSCOPY (EGD) WITH BIOPSIES;  Surgeon: Daniel Guzman DO;  Location: [facility] OR     HEMORRHOID SURGERY  1951     DE ESOPHAGOGASTRODUODENOSCOPY TRANSORAL DIAGNOSTIC N/A 10/21/2018    Procedure: ESOPHAGOGASTRODUODENOSCOPY (EGD) with biopsy;  Surgeon: Masoud Machuca MD;  Location: [facility];  Service: Gastroenterology     SIGMOIDOSCOPY FLEXIBLE N/A 2/21/2022    Procedure: SIGMOIDOSCOPY, FLEXIBLE;  Surgeon: Daniel Guzman DO;  Location: [facility] OR     TONSILLECTOMY  1943       Social History   I have reviewed this patient's social history and updated it with pertinent information if needed.  Social History     Tobacco Use     Smoking status: Former     Packs/day: 1.00     Years: 16.00      Pack years: 16.00     Types: Cigarettes     Quit date: 1957     Years since quittin.9     Smokeless tobacco: Never   Substance Use Topics     Alcohol use: No     Drug use: No       Family History   I have reviewed this patient's family history and updated it with pertinent information if needed.  Family History   Problem Relation Age of Onset     Cancer Mother      Other - See Comments Father 35.00         in a car accident     Diabetes Type 2  Brother      No Known Problems Brother      Cerebrovascular Disease Sister      No Known Problems Sister      Cerebrovascular Disease Sister      No Known Problems Daughter      No Known Problems Daughter      CABG Brother        Prior to Admission Medications   Prior to Admission Medications   Prescriptions Last Dose Informant Patient Reported? Taking?   digoxin (LANOXIN) 125 mcg tablet 2022  No Yes   Sig: [DIGOXIN (LANOXIN) 125 MCG TABLET] Take 1 tablet (125 mcg total) by mouth daily with supper.   fluticasone-salmeterol (ADVAIR) 250-50 MCG/DOSE inhaler 2022  Yes Yes   Sig: Inhale 1 puff into the lungs 2 times daily   lisinopril (PRINIVIL,ZESTRIL) 5 MG tablet 2022  No Yes   Sig: [LISINOPRIL (PRINIVIL,ZESTRIL) 5 MG TABLET] Take 1 tablet (5 mg total) by mouth at bedtime.   pantoprazole (PROTONIX) 40 MG EC tablet Not Taking  No No   Sig: Take 1 tablet (40 mg) by mouth every morning (before breakfast)   Patient not taking: Reported on 2022   polyethylene glycol (MIRALAX) 17 g packet Not Taking  No No   Sig: Take 17 g by mouth 2 times daily   Patient not taking: Reported on 2022   senna-docusate (SENOKOT-S/PERICOLACE) 8.6-50 MG tablet Not Taking  No No   Sig: Take 2 tablets by mouth 2 times daily   Patient not taking: Reported on 2022      Facility-Administered Medications: None     Allergies   Allergies   Allergen Reactions     Spironolactone Rash     patient broke out in very bad rash on both arms.        Physical Exam    Vital Signs: Temp: (!) 96.3  F (35.7  C) Temp src: Axillary BP: (!) 178/74 Pulse: (!) 37   Resp: 15 SpO2: 100 % O2 Device: None (Room air)    Weight: 160 lbs 0 oz    General appearance: not in acute distress  HEENT: PERRL, EOMI  Lungs: Clear breath sounds in bilateral lung fields  Cardiovascular: Bradycardia, regular rhythm, normal S1-S2  Abdomen: Soft, non tender, no distension, normal bowel sound  Musculoskeletal: No joint swelling  Skin: No rash and no edema  Neurology: AAO ×3.  Cranial nerves II - XII normal.  Normal muscle strength in all four extremities.    Data   Data reviewed today: I reviewed all medications, new labs and imaging results over the last 24 hours.     Recent Labs   Lab 12/14/22  1533 12/14/22  1449   WBC 14.1*  --    HGB 12.8*  --      --      --    NA  --  133*   POTASSIUM  --  4.9   CHLORIDE  --  98   CO2  --  24   BUN  --  27.3*   CR  --  0.94   ANIONGAP  --  11   GHAZAL  --  8.8   GLC  --  104*

## 2022-12-15 NOTE — UTILIZATION REVIEW
Inpatient appropriate  Admission Status; Secondary Review Determination     Under the authority of the Utilization Management Committee, the utilization review process indicated a secondary review on the above patient. The review outcome is based on review of the medical records, discussions with staff, and applying clinical experience noted on the date of the review.     (x) Inpatient Status Appropriate - This patient's medical care is consistent with medical management for inpatient care and reasonable inpatient medical practice.     RATIONALE FOR DETERMINATION   89-year-old male brought to the ED by ambulance for evaluation of weakness and diarrhea for 2 days.  Past medical history of dilated cardiomyopathy, bronchiectasis, CHF, Torres's esophagus, COPD, LBBB, anemia, dementia, constipation.  Upon ED arrival, patient was hypertensive 178/81 and bradycardic in the 30s.  Laboratory work-up remarkable for sodium 133, BUN 27.3, troponin T 56, WBC 14.1, hemoglobin 12.8, D-dimer 0.7.  EKG shows a sinus bradycardia at 37 bpm, first-degree AV block, LBBB, nonspecific T waves.  At home patient takes digoxin but level was not elevated.  Patient reported to admitting provider that he had fainted.  Cardiology service consulted given elevated troponin, sinus bradycardia.  Heart rate has remained mostly in the 30s.  Patient is not on any AV shun blockers per home med list.  At the time of admission with the information available to the attending physician more than 2 nights Hospital complex care was anticipated, based on patient risk of adverse outcome if treated as outpatient and complex care required. Inpatient admission is appropriate based on the Medicare guidelines.     This document was produced using voice recognition software     The information on this document is developed by the utilization review team in order for the business office to ensure compliance. This only denotes the appropriateness of proper admission  status and does not reflect the quality of care rendered.   The definitions of Inpatient Status and Observation Status used in making the determination above are those provided in the CMS Coverage Manual, Chapter 1 and Chapter 6, section 70.4.     Sincerely,     Raghav Galarza MD  [facility]  Utilization Review Physician Advisor  Pager: [phone]

## 2022-12-15 NOTE — CONSULTS
We have been asked to see Billie Garcias at [facility] by Dr. Rae Rene for bradycardia.    Impression and Plan     Assessment:  1. Syncope likely due to symptomatic complete heart block. 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 His barely therapeutic digoxin level is unlikely a major contributor.  2. Chronic congestive heart failure with reduced left ventricular ejection fraction lasted noted to be 30% on 10/26/2020. Unclear etiology but possibly nonischemic induced by left bundle branch block. He does not appear fluid-overloaded and his brain natriuretic peptide is not elevated today.   3. Borderline stable elevated high-sensitivity troponin likely demand-mediated from complete heart block.  4. Chronic obstructive pulmonary disease.  5. Memory issues.  6. Do not resuscitate status.    Plan:    Admit to medical telemetry with transcutaneous pacing pads readily available    I discussed permanent pacemaker placement with his wife. She does not think it would be good to put him through an invasive procedure but I did discuss the high morbidity and mortality of untreated complete heart block and that he would likely tolerate such a procedure without much difficulty with an improvement in quality of life. If he does not go forward with permanent pacemaker placement, I would then consider hospice. She will discuss this with family.    He is likely to get most symptomatic benefit from a single right ventricular lead or dual-chamber permanent pacemaker placement. An additional left ventricular lead for cardiac resynchronization therapy could also be considered, but he may not derive much benefit given his age and low activity level. As he has previously indicated he wishes to be do not resuscitate, I do not think an implantable cardiac defibrillator for the primary prevention of sudden cardiac death would be  warranted    Hold digoxin and any rate-lowering or atrioventricular shun blocking agents for now    Continue lisinopril    Depending on his clinical course and goals of care, an ischemic evaluation could be considered but in the absence of ischemic symptoms I think this may be low yield     Primary Cardiologist: none. Can establish with me    Clinically Significant Risk Factors Present on Admission           Cardiovascular: Cardiac Arrhythmia: Bradycardia, unspecified    Systemic: Chronic Fatigue and Other Debilities: Other reduced mobility       History of Present Illness      Mr. Billie Garcias is a 89 year old male with a history of HFrEF 30% suspected to be LBBB-induced and COPD admitted after a syncopal episode. He is somewhat a poor historian but says when he got up from bed to use the bathroom, he lost consciousness without warning. He denies any other symptoms although his wife says he has been unusually low energy and sleeping more this past week. He does not typically complain of any other symptoms such as chest pain/pressure/tightness, shortness of breath at rest or with exertion, light headedness/dizziness, lower extremity swelling, palpitations, paroxysmal nocturnal dyspnea (PND), or orthopnea. However, he is quite sedentary and the last time he could walk a block was over the summer, according to his wife.    In the ED he was hemodynamically stable but noted to be in complete heart block with a heart rate in the 30s. NT-proBNP not elevated at 885. Hs-troponin stable mild elevation at 56->62->62->63. Digoxin level was at the low end of therapeutic at 0.6. TTE was effectively diagnostic. He previously followed in our cardiology group but appeared to not be interested in any invasive procedures (including an ischemic evaluation) and had a poor understanding ofhis medications.    Review of Systems:  Further review of systems is otherwise negative/noncontributory (based on review of medical record  (admission H&P) and 13 point review of systems reviewed. Pertinent positives noted).    Cardiac Diagnostics     ECG 12/14/2022 (personally reviewed and interpreted): SR with complete heart block and ventricular escape rhythm with a raate of 37 bpm    Telemetry (personally reviewed): CHF with heart rates in the 30s    PFTs 11/5/2018 (report reviewed):  Full Pulmonary Function Test is abnormal  Spirometry is consistent with moderate-severe obstructive ventilatory defect.  Spirometry is consistent with reversibility.  There is hyperinflation.  There is air-trapping.  Diffusion capacity when corrected for hemoglobin is moderately reduced.    Echocardiogram 10/26/2020 (results reviewed):   1. Severely reduced LV systolic function. EF 30%.   2. Normal RV size and function.   3. Mild LA dilatation.   4. No significant valvular abnormalities.   5. Compared to prior report (outside study), EF is reduced.     Medical History  Surgical History Family History Social History   Past Medical History:   Diagnosis Date     Chronic HFrEF (heart failure with reduced ejection fraction) (H)      COPD (chronic obstructive pulmonary disease) (H)      LBBB (left bundle branch block)      Past Surgical History:   Procedure Laterality Date     ESOPHAGOSCOPY, GASTROSCOPY, DUODENOSCOPY (EGD), COMBINED N/A 2/21/2022    Procedure: ESOPHAGOGASTRODUODENOSCOPY (EGD) WITH BIOPSIES;  Surgeon: Daniel Guzman DO;  Location: [facility] [place] OR     HEMORRHOID SURGERY  1951     OR ESOPHAGOGASTRODUODENOSCOPY TRANSORAL DIAGNOSTIC N/A 10/21/2018    Procedure: ESOPHAGOGASTRODUODENOSCOPY (EGD) with biopsy;  Surgeon: Masoud Machuca MD;  Location: [facility] GI;  Service: Gastroenterology     SIGMOIDOSCOPY FLEXIBLE N/A 2/21/2022    Procedure: SIGMOIDOSCOPY, FLEXIBLE;  Surgeon: Daniel Guzman DO;  Location: [facility] [place] OR     TONSILLECTOMY  1943     Family History   Problem Relation Age of Onset     Cancer Mother      Other - See Comments Father 35.00         " in a car accident     Diabetes Type 2  Brother      No Known Problems Brother      Cerebrovascular Disease Sister      No Known Problems Sister      Cerebrovascular Disease Sister      No Known Problems Daughter      No Known Problems Daughter      CABG Brother            Social History     Socioeconomic History     Marital status:      Spouse name: Not on file     Number of children: 2     Years of education: Not on file     Highest education level: Not on file   Occupational History     Not on file   Tobacco Use     Smoking status: Former     Packs/day: 1.00     Years: 16.00     Pack years: 16.00     Types: Cigarettes     Quit date: 1957     Years since quittin.9     Smokeless tobacco: Never   Substance and Sexual Activity     Alcohol use: No     Drug use: No     Sexual activity: Not Currently     Partners: Female   Other Topics Concern     Not on file   Social History Narrative    He lives with his wife.     Social Determinants of Health     Financial Resource Strain: Not on file   Food Insecurity: Not on file   Transportation Needs: Not on file   Physical Activity: Not on file   Stress: Not on file   Social Connections: Not on file   Intimate Partner Violence: Not on file   Housing Stability: Not on file             Physical Examination   VITALS: BP (!) 140/66   Pulse (!) 37   Temp 98.3  F (36.8  C) (Oral)   Resp 20   Ht 1.854 m (6' 1\")   Wt 72.6 kg (160 lb)   SpO2 94%   BMI 21.11 kg/m    BMI: Body mass index is 21.11 kg/m .  Wt Readings from Last 3 Encounters:   22 72.6 kg (160 lb)   22 57.4 kg (126 lb 9.6 oz)   10/17/19 64.9 kg (143 lb)     Intake/Output Summary (Last 24 hours) at 12/15/2022 1307  Last data filed at 2022 1620  Gross per 24 hour   Intake 500 ml   Output --   Net 500 ml       General Appearance:  Alert, cooperative, no distress, appears stated age   Head:  Normocephalic, without obvious abnormality, atraumatic   Eyes:  PERRL, conjunctiva/corneas " clear, EOM's intact   Ears:  Normal external ear canals bilaterally   Nose: Nares normal, septum midline, no drainage   Throat: Lips, mucosa, and tongue normal; teeth and gums normal   Neck: Supple, symmetrical, trachea midline, no adenopathy, no carotid bruit or JVD   Back:   Symmetric, no abnormal curvature, ROM normal   Lungs:   Clear to auscultation bilaterally, respirations unlabored   Chest Wall:  No tenderness or deformity   Heart:  Bradycardic, S1, S2 normal,no murmur, rub or gallop   Abdomen:   Soft, non-tender, bowel sounds active all four quadrants,  no masses, no organomegaly   Extremities: Extremities normal, atraumatic, no cyanosis or edema   Skin: Skin color, texture, turgor normal, no rashes or lesions   Psychiatric: Normal affect, pleasant and cooperative.   Neurologic: Alert and oriented X 3, Moves all 4 extremities            Imaging      CTA chest 1/29/2019 (images personally reviewed and interpreted):  1.  Negative for pulmonary embolism. Signs of pulmonary hypertension.  2.  Bronchiectasis with increasing mucus plugging and debris within the airways. Again, chronic aspiration is suspected.  3.  Cholelithiasis  4.  Coronary artery calcification.     Lab Results    Chemistry/lipid CBC Cardiac Enzymes/BNP/TSH/INR   Recent Labs   Lab Test 04/15/16  1132   CHOL 215*   HDL 34*   *   TRIG 87     Recent Labs   Lab Test 04/15/16  1132 04/10/15  0841   * 150*     Recent Labs   Lab Test 12/15/22  0804      POTASSIUM 4.2   CHLORIDE 104   CO2 24   GLC 65*   BUN 32.7*   CR 1.08   GFRESTIMATED 65   GHAZAL 8.4     Recent Labs   Lab Test 12/15/22  0804 12/14/22  1449 02/21/22  0709   CR 1.08 0.94 0.88        Recent Labs   Lab Test 12/14/22  1533   WBC 14.1*   HGB 12.8*   HCT 39.2*           Recent Labs   Lab Test 12/14/22  1533 02/21/22  0709 01/09/22  1756   HGB 12.8* 11.4* 12.6*    Recent Labs   Lab Test 01/29/19  2211 01/29/19  1612 01/29/19  1002   TROPONINI 0.08 0.05 0.05      Recent Labs   Lab Test 12/14/22  1449 03/20/19  0927 01/29/19  1002 11/07/18  1023   BNP  --  146* 240* 145*   NTBNPI 885  --   --   --      Recent Labs   Lab Test 01/29/19  1612   TSH 0.73     Recent Labs   Lab Test 01/29/19  1002 08/23/18  0449   INR 1.11* 1.08           Current Inpatient Scheduled Medications   Scheduled Meds:    enoxaparin ANTICOAGULANT  40 mg Subcutaneous Q24H     fluticasone-vilanterol  1 puff Inhalation Daily     lisinopril  5 mg Oral At Bedtime     sodium chloride (PF)  3 mL Intracatheter Q8H     Continuous Infusions:    sodium chloride 50 mL/hr at 12/15/22 1205       Current Outpatient Medications   Medication Sig Dispense Refill     digoxin (LANOXIN) 125 mcg tablet [DIGOXIN (LANOXIN) 125 MCG TABLET] Take 1 tablet (125 mcg total) by mouth daily with supper. 90 tablet 1     fluticasone-salmeterol (ADVAIR) 250-50 MCG/DOSE inhaler Inhale 1 puff into the lungs 2 times daily       lisinopril (PRINIVIL,ZESTRIL) 5 MG tablet [LISINOPRIL (PRINIVIL,ZESTRIL) 5 MG TABLET] Take 1 tablet (5 mg total) by mouth at bedtime. 90 tablet 3     pantoprazole (PROTONIX) 40 MG EC tablet Take 1 tablet (40 mg) by mouth every morning (before breakfast) (Patient not taking: Reported on 12/14/2022) 30 tablet 0     polyethylene glycol (MIRALAX) 17 g packet Take 17 g by mouth 2 times daily (Patient not taking: Reported on 12/14/2022) 30 packet 0     senna-docusate (SENOKOT-S/PERICOLACE) 8.6-50 MG tablet Take 2 tablets by mouth 2 times daily (Patient not taking: Reported on 12/14/2022) 60 tablet 0          Medications Prior to Admission   Prior to Admission medications    Medication Sig Start Date End Date Taking? Authorizing Provider   digoxin (LANOXIN) 125 mcg tablet [DIGOXIN (LANOXIN) 125 MCG TABLET] Take 1 tablet (125 mcg total) by mouth daily with supper. 2/25/19  Yes Jose Antonio Bryant MD   fluticasone-salmeterol (ADVAIR) 250-50 MCG/DOSE inhaler Inhale 1 puff into the lungs 2 times daily   Yes Unknown, Entered By  History   lisinopril (PRINIVIL,ZESTRIL) 5 MG tablet [LISINOPRIL (PRINIVIL,ZESTRIL) 5 MG TABLET] Take 1 tablet (5 mg total) by mouth at bedtime. 1/16/19  Yes Edgar Baptiste, CARLOS CNP   pantoprazole (PROTONIX) 40 MG EC tablet Take 1 tablet (40 mg) by mouth every morning (before breakfast)  Patient not taking: Reported on 12/14/2022 2/21/22   Ced uNll DO   polyethylene glycol (MIRALAX) 17 g packet Take 17 g by mouth 2 times daily  Patient not taking: Reported on 12/14/2022 2/21/22   Ced Null DO   senna-docusate (SENOKOT-S/PERICOLACE) 8.6-50 MG tablet Take 2 tablets by mouth 2 times daily  Patient not taking: Reported on 12/14/2022 2/21/22   Ced Null DO Brent E. White, MD [facility]  Non-Invasive Cardiologist  [facility]  Pager [phone]

## 2022-12-15 NOTE — PHARMACY-ADMISSION MEDICATION HISTORY
Pharmacy Note - Admission Medication History    Pertinent Provider Information: None     ______________________________________________________________________    Prior To Admission (PTA) med list completed and updated in EMR.       PTA Med List   Medication Sig Last Dose     digoxin (LANOXIN) 125 mcg tablet [DIGOXIN (LANOXIN) 125 MCG TABLET] Take 1 tablet (125 mcg total) by mouth daily with supper. 12/13/2022     fluticasone-salmeterol (ADVAIR) 250-50 MCG/DOSE inhaler Inhale 1 puff into the lungs 2 times daily 12/13/2022     lisinopril (PRINIVIL,ZESTRIL) 5 MG tablet [LISINOPRIL (PRINIVIL,ZESTRIL) 5 MG TABLET] Take 1 tablet (5 mg total) by mouth at bedtime. 12/13/2022       Information source(s): Family member and CareEverywhere/SureScripts  Method of interview communication: phone    Summary of Changes to PTA Med List  New: None  Discontinued: pantoprazole, tamsulosin, Miralax, senna-docusate  Changed: None    Patient was asked about OTC/herbal products specifically.  PTA med list reflects this.    In the past week, patient estimated taking medication this percent of the time:  greater than 90%.     Patient did not bring any medications to the hospital and can't retrieve from home. No multi-dose medications are available for use during hospital stay.     The information provided in this note is only as accurate as the sources available at the time of the update(s).    Thank you for the opportunity to participate in the care of this patient.    Sheila Guevara [facility]  12/14/2022 6:58 PM

## 2022-12-15 NOTE — ED NOTES
[employer] messaged Gisell Yost MD cardiologist regarding pt HR dropping to 27-29 bpm for brief periods.

## 2022-12-15 NOTE — ED NOTES
"[facility] ED Handoff Report    ED Chief Complaint:    Patient brought by medic c/o diarrhea for 2 days and weakness. Blood sugar per medic 101.  Family called medic.  Hx: Denise       ED Diagnosis:  (M62.81) Generalized muscle weakness  Comment:   Plan:     (R00.1) Sinus bradycardia  Comment: Troponin level changed from 56-62.  Plan:      (R19.7) Diarrhea, unspecified type  Comment:   Plan:        PMH:  History reviewed. No pertinent past medical history.     Code Status:  No CPR- Do NOT Intubate     Falls Risk: Yes Band: Applied    Current Living Situation/Residence: lives with a significant other     Elimination Status: Continent: wears briefs     Activity Level: 2 assist    Patients Preferred Language:  English     Needed: No    Vital Signs:  BP (!) 161/73   Pulse (!) 36   Temp (!) 96.3  F (35.7  C) (Axillary)   Resp 18   Ht 1.854 m (6' 1\")   Wt 72.6 kg (160 lb)   SpO2 98%   BMI 21.11 kg/m       Cardiac Rhythm: SB, AVB, 30's    Pain Score: 0/10    Is the Patient Confused:  Yes    Last Food or Drink: 12/14/22    Focused Assessment:  Pt is alert, cooperative, forgetful, denies c/p or sob, piv #18 x2, o2 2l nc    Tests Performed: Done: Labs and Imaging    Treatments Provided:      Family Dynamics/Concerns: No    Family Updated On Visitor Policy: Yes    Plan of Care Communicated to Family: Yes    Who Was Updated about Plan of Care: Pt    Belongings Checklist Done and Signed by Patient: No    Medications sent with patient: na    Covid: asymptomatic , negative    Additional Information:     RN: Valentina Judd RN   12/14/2022 7:59 PM      "

## 2022-12-15 NOTE — PROGRESS NOTES
[facility]    Medicine Progress Note - Hospitalist Service    Date of Admission:  12/14/2022    Assessment & Plan   Billie Garcias is a 89-year-old man with history of Alzheimer's dementia, COPD, and HFrEF admitted on 12/14/2022 due to syncope and suspected complete heart block    Echocardiogram 10/26/2020 revealed LVEF of 30%.  Cardiologist recommended pacemaker placement, however, wife plans to discuss with family before deciding on invasive intervention.  Family is aware of high risk of morbidity and mortality associated with untreated complete heart block.    Syncope  Symptomatic bradycardia  Complete heart block  Possible pacemaker placement (single right ventricular lead or dual-chamber permanent pacemaker) depending on family's decision; family is aware of risk of morbidity mortality associated with complete heart block  Will consider hospice evaluation if family declines invasive intervention.  Avoid AV shun blocking agent  Notify cardiologist if HR < 30 or if patient becomes hypotensive   Continue to hold digoxin  Continue telemetry  Follow-up echocardiogram  Possible work-up for myocardial ischemia depending on clinical course as per cardiologist.  Keep transcutaneous pacing pads in place    Addendum:  12/15/22 4:12 PM: Discussed with patient's wife about need for pacemaker placement.  She states she would like patient to proceed with pacemaker placement.  Message sent to cardiologist through the [place] [facility]     HFrEF  Not on diuretic therapy PTA   Hold PTA digoxin  Continue PTA lisinopril 5 mg daily  Follow-up echocardiogram  Cardiology [id number] assistance    Hypoglycemia  Oral intake as tolerated  Monitor for hypoglycemia and correct as per protocol    Hyponatremia  Possibly related to heart failure  Monitor sodium level    COPD  Not in acute exacerbation  Continue PTA Breo Ellipta  DuoNebs as needed  Supplemental oxygen as needed    Diet: Regular Diet Adult    DVT  Prophylaxis: Enoxaparin (Lovenox) SQ  Dejesus Catheter: Not present  Central Lines: None  Cardiac Monitoring: ACTIVE order. Indication: Bradycardias (48 hours)  Code Status: No CPR- Do NOT Intubate      Disposition Plan     Expected Discharge Date: 12/16/2022                The patient's care was discussed with the Patient and wife (over the phone)    Price Azevedo MD  Hospitalist Service  [facility]  Securely message with the Vocera Web Console (learn more here)  Text page via [email or web address] Paging/Directory         Clinically Significant Risk Factors Present on Admission         # Hyponatremia: Lowest Na = 133 mmol/L in last 2 days, will monitor as appropriate          # Hypertension: home medication list includes antihypertensive(s)   # Dementia: noted on problem list            ______________________________________________________________________    Interval History   No new complaints today.  He denies chest pain or shortness of breath.  Patient is bradycardic with heart rate in the 30s.    Data reviewed today: I reviewed all medications, new labs and imaging results over the last 24 hours. I personally reviewed no images or EKG's today.    Physical Exam   Vital Signs: Temp: 98.3  F (36.8  C) Temp src: Oral BP: (!) 150/67 Pulse: (!) 38   Resp: 26 SpO2: 94 %      Weight: 160 lbs 0 oz    General appearance: Awake, Alert, Cooperative, not in any obvious distress and appears stated age   HEENT: Normocephalic, atraumatic, conjunctiva clear without icterus and ears without discharge  Lungs: Diminished air entry bilaterally.  Cardiovascular: Bradycardic, normal apical impulse, normal S1 and S2, no lower extremity edema bilaterally  Abdomen: Soft, non-tender and Non-distended, active bowel sounds  Skin: Skin color, texture normal and bruising or bleeding. No rashes or lesions over face, neck, arms and legs, turgor normal.  Musculoskeletal: No bony deformities or joint tenderness. Normal ROM upon  flexion & extension.   Neurologic: Awake and alert, facial symmetry preserved and upper & lower extremities moving well with symmetry  Psychiatric: Confused      Data   Recent Results (from the past 24 hour(s))   Echocardiogram Complete    Narrative    [id number]  [id number]  [id number]  [id number]^[id number] [address]     Name: MARLENA SEXTON  MRN: [id number]  : 1932  Study Date: 12/15/2022 06:46 AM  Age: 89 yrs  Gender: Male  Patient Location: [facility]  Reason For Study: Bradycardia - Sinus  Ordering Physician: JOSE SANON  Performed By: DANE     BSA: 2.0 m2  Height: 73 in  Weight: 160 lb  HR: 40  ______________________________________________________________________________  Procedure  Limited Portable Echo Adult.  ______________________________________________________________________________  Interpretation Summary     No interpretable images were obtained.  ______________________________________________________________________________  ______________________________________________________________________________  Report approved by: Chetan Ro 12/15/2022 09:30 AM     ______________________________________________________________________________      XR Chest Port 1 View    Narrative    EXAM: XR CHEST PORT 1 VIEW  LOCATION: [facility]  DATE/TIME: 12/15/2022 1:42 PM    INDICATION: complete heart block, history of CHF  COMPARISON: CT angiogram of the chest 2019      Impression    IMPRESSION:     Cardiac silhouette is not enlarged. Fairly extensive atheromatous calcifications of the aortic arch and descending aorta. Unchanged hilar interfaces.    The exam was acquired with apical lordotic technique. Small calcified granulomata are present in the central left apex and right upper lobe along the lateral pleura. No airspace opacities or interstitial thickening.    No pleural effusion is present.

## 2022-12-16 NOTE — DISCHARGE INSTRUCTIONS
[facility]  Cardiac Electrophysiology  [address]  [place] [address]   Office: [phone]  Fax: [phone]     Cardiac Electrophysiology - Post Pacemaker or Defibrillator Implantation Discharge Instructions      PROCEDURE   Pacemaker implantation       WOUND CARE   Leave the large overlying dressing in place until 2 days after discharge - this dressing can thereafter be removed by gently pulling off.  Underneath the large dressing will be steri-strips. DO NOT REMOVE these strips; please leave these in place until you are seen in clinic.  DO NOT COVER the site with any bandages or dressings. The site should be kept dry for 7 days - please avoid traditional showers or baths during this time.  Keep the site clean and dry.  No swimming, sauna, or hot tub use until incision is completely healed.         ACTIVITY   It takes approximately 4 weeks for your pacemaker/defibrillator leads to settle into place. During this time use extra precaution to avoid dislodging the leads.  Avoid the following:  Raising your arm over your head or stretching behind your back (no hyperflexion)  Pushing or pulling (mowing the lawn, vacuuming)  Lifting anything heavier than 10 pounds or a gallon of milk  Sudden or extreme motions  Be physically active every day.  Moving your arm is important       REMOTE MONITORING   You will receive a remote transmission station to monitor your device from home  Please set the transmitter up as soon as you get home from the hospital.  Directions are included in the box, and you may call our office or the company technical support line if you need assistance connecting your transmitter.  Please send a transmission the day after you go home  Automated transmissions will be sent every 3 months or sooner if device issues are detected.  You may manually send in transmissions if you are having arrhythmia symptoms or think there may be a problem with your device.   Please call our office at [phone] if you send in a transmission off-schedule         WHAT TO WATCH OUT FOR   Contact our office or seek emergency care for any of the following:  Drainage, bleeding or oozing from the site  Redness, increased swelling, or warmth around the device site  Pain not treated with prescribed pain medication  Temperature greater than 100.4F  Chest pain, shortness of breath, dizziness/fainting         FOLLOW-UP   Our office will coordinate a follow-up visit visit in clinic for a device interrogation and an incision check 7-14 days after your procedure.   Please contact us at [phone] with any issues during your recovery.

## 2022-12-16 NOTE — ED NOTES
"[facility] ED Handoff Report    ED Chief Complaint: Generalized weakness    ED Diagnosis:  (I44.7) Left bundle branch block (LBBB)  (primary encounter diagnosis)  Plan: Case Request: Pacemaker Device & Lead Implant -        Right Atrial & Right Ventricular    (M62.81) Generalized muscle weakness    (R00.1) Sinus bradycardia  Comment: Troponin level changed from 56-62.    (R19.7) Diarrhea, unspecified type    (I44.2) CHB (complete heart block) (H)  Plan: Case Request: Pacemaker Device & Lead Implant -        Right Atrial & Right Ventricular     PMH:    Past Medical History:   Diagnosis Date    Chronic HFrEF (heart failure with reduced ejection fraction) (H)     COPD (chronic obstructive pulmonary disease) (H)     LBBB (left bundle branch block)         Code Status:  No CPR- Do NOT Intubate     Falls Risk: Yes Band: Applied    Current Living Situation/Residence: lives with a significant other     Elimination Status: Continent: Yes     Activity Level: SBA w/ walker    Patients Preferred Language:  English     Needed: No    Vital Signs:  BP (!) 150/67   Pulse (!) 38   Temp 98.3  F (36.8  C) (Oral)   Resp 26   Ht 1.854 m (6' 1\")   Wt 72.6 kg (160 lb)   SpO2 94%   BMI 21.11 kg/m       Cardiac Rhythm: Sinus merlin    Pain Score: 0/10    Is the Patient Confused:  No    Last Food or Drink: 12/15/22    Focused Assessment: Pt brought into ER for generalized weakness and diarrhea for 2 days.  Pt found to be bradycardic with a HR in the 17-37 bpm.  Pt has not had a stool since arrival.  Pt going to have a pacemaker placed.  Pt has history of Alzheimers, but answers all orientation questions correctly.  Pt is a DNR/DNI.  Pt is on the Magnesium protocol.  Magnesium is WNL.    Tests Performed: Done: Labs and Imaging    Treatments Provided:  Cardiac monitoring, medications and fluids.    Family Dynamics/Concerns: No    Family Updated On Visitor Policy: Yes    Plan of Care Communicated to Family: Yes    Who " Was Updated about Plan of Care: Louann, spouse    Belongings Checklist Done and Signed by Patient: Yes    Medications sent with patient: NA    Additional Information: There is an order to update the Cardiologist when pt HR drops below 30.  Updated Caridologist.  Was advised to that this is a chronic vs. Acute change and temporary pacer will not be required at this time.  Continue to monitor.    RN: Lisa Levine RN   12/15/2022 7:54 PM

## 2022-12-16 NOTE — PROGRESS NOTES
Contacted daughter Pepe per emergency contact list.  Informed daughter that her mother had called earlier in morning (06:30 hrs).  Informed pepe, that mother sounded very distressed and needed repeated assurance that her  was receiving good care.  Also, that the Pace Maker insertion was still scheduled to occur today.  Pepe, at this time, said she was unaware of the plan to place a pace maker in her father.  Informed daughter that according to Cardiologist's note (Zarina) the mother (Louann) made the decision to go ahead with the pace maker.  This was noted on 12/15/22. 4:13 PM (see Dr Sun note, addendum).  The daughter said she lives next door to her parents and will check in on her mother.    Informed daughter that the mother indicated that she was having frequent loose stools throughout the NOC.  Presently the pt is being screened for C-dif.  Because of this, nursing staff strongly suggested that the mother be assessed for C-dif right away. This information was also passed on to the daughter.  In addition, the daughter indicated that the patient's care is becoming very demanding of the mother.  Informed the mother that [facility] has a SW and  monitor and assist patient and their families.  The SW and  are a resource to assist with numerous care, financial, and social needs.    The daughter acknowledged the aforementioned.  The daughter was also informed that because the patient (her father) has dementia, he will need a family member to consent for the procedure to occur.  She also acknowledged this information.

## 2022-12-16 NOTE — PROGRESS NOTES
Notified by monitor tech that pt's HR dropping to sustained high 20's to low 30's.  No S&S noted.  Hospitalist (Shay) present on P3 due to RR for another pt.  Showed rhythm to Hospitalist.  After confirming the pt was in a  2:1 Type 2 HB.    Stat order for X1 0.5 IV Atropine ordered.   Medication administered.  HR restored to mid 40's to low 60's.    Discussed post admin care with Hospitalist, regarding plan if HR drops again.  Instruction to call Cardiologist for any further action.    Awaiting reply from Cardiologist.

## 2022-12-16 NOTE — PROGRESS NOTES
Impression and Plan     Impression:   1. Syncope likely due to symptomatic complete heart block. 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 His barely therapeutic digoxin level is unlikely a major contributor.  2. Chronic congestive heart failure with reduced left ventricular ejection fraction lasted noted to be 30% on 10/26/2020. Unclear etiology but possibly nonischemic induced by left bundle branch block. He does not appear fluid-overloaded and his brain natriuretic peptide is not elevated today.   3. Borderline stable elevated high-sensitivity troponin likely demand-mediated from complete heart block.  4. Chronic obstructive pulmonary disease.  5. Memory issues.  6. Do not resuscitate status.    Plan:    Plan for dual-chamber permanent pacemaker placement today. An additional left ventricular lead for cardiac resynchronization therapy could also be considered, but he may not derive much benefit given his age and low activity level. As he has previously indicated he wishes to be do not resuscitate, I do not think an implantable cardiac defibrillator for the primary prevention of sudden cardiac death would be warranted    Hold digoxin and any rate-lowering or atrioventricular shun blocking agents for now    Continue lisinopril    Depending on his clinical course and goals of care, an ischemic evaluation could be considered but in the absence of ischemic symptoms I think this may be low yield     Primary Cardiologist: Can establish with me    Subjective     - family agreeable to pacemaker placement  - patient has no complaints today    Cardiac Diagnostics   ECG 12/14/2022 (personally reviewed and interpreted): SR with complete heart block and ventricular escape rhythm with a raate of 37 bpm     Telemetry (personally reviewed): CHF with heart rates in the 30s     PFTs 11/5/2018 (report reviewed):  Full Pulmonary Function Test is  "abnormal  Spirometry is consistent with moderate-severe obstructive ventilatory defect.  Spirometry is consistent with reversibility.  There is hyperinflation.  There is air-trapping.  Diffusion capacity when corrected for hemoglobin is moderately reduced.     Echocardiogram 10/26/2020 (results reviewed):   1. Severely reduced LV systolic function. EF 30%.   2. Normal RV size and function.   3. Mild LA dilatation.   4. No significant valvular abnormalities.   5. Compared to prior report (outside study), EF is reduced.     Physical Examination       BP (!) 158/70   Pulse (!) 31   Temp 97.6  F (36.4  C) (Oral)   Resp 15   Ht 1.854 m (6' 1\")   Wt 72.9 kg (160 lb 12.8 oz)   SpO2 97%   BMI 21.22 kg/m          Wt Readings from Last 3 Encounters:   12/16/22 72.9 kg (160 lb 12.8 oz)   02/19/22 57.4 kg (126 lb 9.6 oz)   10/17/19 64.9 kg (143 lb)     Intake/Output Summary (Last 24 hours) at 12/16/2022 1612  Last data filed at 12/16/2022 0900  Gross per 24 hour   Intake 0 ml   Output 0 ml   Net 0 ml       General: pleasant male. No acute distress.  HENT: external ears normal. Nares patent. Mucous membranes moist.  Eyes: perrla, extraocular muscles intact. No scleral icterus.   Neck: No JVD  Lungs: clear to auscultation  COR: bradycardic. No murmurs, rubs, or gallops  Abd: nondistended, BS present  Extrem: No edema         Imaging      CTA chest 1/29/2019 (images personally reviewed and interpreted):  1.  Negative for pulmonary embolism. Signs of pulmonary hypertension.  2.  Bronchiectasis with increasing mucus plugging and debris within the airways. Again, chronic aspiration is suspected.  3.  Cholelithiasis  4.  Coronary artery calcification.    CXR 12/15/2022 (report reviewed):  Cardiac silhouette is not enlarged. Fairly extensive atheromatous calcifications of the aortic arch and descending aorta. Unchanged hilar interfaces.     The exam was acquired with apical lordotic technique. Small calcified granulomata are " present in the central left apex and right upper lobe along the lateral pleura. No airspace opacities or interstitial thickening.     No pleural effusion is present.    Lab Results   Lab Results   Component Value Date     12/15/2022    CO2 24 12/15/2022    CO2 25 02/21/2022    BUN 32.7 12/15/2022    BUN 24 02/21/2022     Lab Results   Component Value Date    WBC 14.1 12/14/2022    HGB 12.8 12/14/2022    HCT 39.2 12/14/2022     12/14/2022     12/14/2022     Lab Results   Component Value Date    CHOL 215 04/15/2016    TRIG 87 04/15/2016    HDL 34 04/15/2016     Lab Results   Component Value Date    INR 1.11 01/29/2019     Lab Results   Component Value Date     03/20/2019     Lab Results   Component Value Date    TROPONINI 0.08 01/29/2019    TROPONINI 0.05 01/29/2019    TROPONINI 0.05 01/29/2019     Lab Results   Component Value Date    TSH 0.73 01/29/2019           Current Inpatient Scheduled Medications   Scheduled Meds:    ceFAZolin  2 g Intravenous Pre-Op/Pre-procedure x 1 dose     cefazolin 1 g in 500 mL NS  1,000 mg Irrigation Once     [Held by provider] enoxaparin ANTICOAGULANT  40 mg Subcutaneous Q24H     fluticasone-vilanterol  1 puff Inhalation Daily     lisinopril  5 mg Oral At Bedtime     sodium chloride (PF)  3 mL Intracatheter Q8H     sodium chloride (PF)  3 mL Intracatheter Q8H     sodium chloride (PF)  3 mL Intracatheter Q8H          Medications Prior to Admission   Prior to Admission medications    Medication Sig Start Date End Date Taking? Authorizing Provider   digoxin (LANOXIN) 125 mcg tablet [DIGOXIN (LANOXIN) 125 MCG TABLET] Take 1 tablet (125 mcg total) by mouth daily with supper. 2/25/19  Yes Jose Antonio Bryant MD   fluticasone-salmeterol (ADVAIR) 250-50 MCG/DOSE inhaler Inhale 1 puff into the lungs 2 times daily   Yes Unknown, Entered By History   lisinopril (PRINIVIL,ZESTRIL) 5 MG tablet [LISINOPRIL (PRINIVIL,ZESTRIL) 5 MG TABLET] Take 1 tablet (5 mg total) by mouth  at bedtime. 1/16/19  Yes Edgar Baptiste APRN CNP   pantoprazole (PROTONIX) 40 MG EC tablet Take 1 tablet (40 mg) by mouth every morning (before breakfast)  Patient not taking: Reported on 12/14/2022 2/21/22   Ced Null DO   polyethylene glycol (MIRALAX) 17 g packet Take 17 g by mouth 2 times daily  Patient not taking: Reported on 12/14/2022 2/21/22   Ced Null DO   senna-docusate (SENOKOT-S/PERICOLACE) 8.6-50 MG tablet Take 2 tablets by mouth 2 times daily  Patient not taking: Reported on 12/14/2022 2/21/22   Ced Null DO Brent E. White, MD [facility]  Non-Invasive Cardiologist  [facility]  Pager [phone]

## 2022-12-16 NOTE — PROGRESS NOTES
Care Management Follow Up    Length of Stay (days): 2    Expected Discharge Date: 12/17/2022     Concerns to be Addressed: medical progression      Patient plan of care discussed at interdisciplinary rounds: Yes    Anticipated Discharge Disposition:  TBD     Anticipated Discharge Services:    Anticipated Discharge DME:      Patient/family educated on Medicare website which has current facility and service quality ratings:    Education Provided on the Discharge Plan:    Patient/Family in Agreement with the Plan:      Referrals Placed by CM/SW:  None at this time  Private pay costs discussed: Not applicable    Additional Information:  Chart review:  Per pt wife they live in a house and she is his primary caregiver, however she reports she does not intend to continue to care for him as this is becoming too much for her. CM will need to further discuss plans in the next coming days as at this point pt wife has not signed consent for pacemaker as she would like to speak to family before proceeding with this procedure. At this time if pt does not get the pacemaker he may need to have a hospice consult. CM will wait to hear from family as to how they would like to proceed.    Lona Reddy RN

## 2022-12-16 NOTE — SIGNIFICANT EVENT
Significant Event Note    Time of event: 12:06 AM December 16, 2022    Description of event:    Called about HR low 30s dipping into 20s.  Currently 2:1 AV block.    Alert and interactive.  /62.  No CP or SOB    Plan:  Atropine 0.5mg    Discussed with: patient's family/emergency contact    Toya Day MD

## 2022-12-16 NOTE — PLAN OF CARE
Problem: Plan of Care - These are the overarching goals to be used throughout the patient stay.    Goal: Absence of Hospital-Acquired Illness or Injury  Intervention: Identify and Manage Fall Risk  Recent Flowsheet Documentation  Taken 12/16/2022 0747 by Vikram Gibson RN  Safety Promotion/Fall Prevention:   activity supervised   safety round/check completed   room organization consistent   room near nurse's station   room door open   patient and family education   nonskid shoes/slippers when out of bed   mobility aid in reach   lighting adjusted   increase visualization of patient   increased rounding and observation   fall prevention program maintained   clutter free environment maintained     Problem: Risk for Delirium  Goal: Improved Behavioral Control  Intervention: Minimize Safety Risk  Recent Flowsheet Documentation  Taken 12/16/2022 0747 by Vikram Gibson RN  Enhanced Safety Measures: bed alarm set  Goal: Improved Attention and Thought Clarity  Intervention: Maximize Cognitive Function  Recent Flowsheet Documentation  Taken 12/16/2022 0747 by Vikram Gibson RN  Reorientation Measures: reorientation provided     Problem: Cardiac Rhythm Management Device  Goal: Effective Oxygenation and Ventilation  Intervention: Optimize Oxygenation and Ventilation  Recent Flowsheet Documentation  Taken 12/16/2022 0747 by Vikram Gibson RN  Cough And Deep Breathing: done independently per patient   Goal Outcome Evaluation:         Patient is alert and oriented to self only. Pt kept on NPO prior to procedure. Bed changed with working bed alarm. Lovenox on hold per order. Clipping done on surgical site per order. Procedure explained to pt but unable to comprehend due to decrease cognition.

## 2022-12-16 NOTE — PRE-PROCEDURE
GENERAL PRE-PROCEDURE:   Procedure:  PPM implant for indication of LBBB, NICM, CHB  Date/Time:  12/16/2022 2:40 PM    Written consent obtained?: Yes    Risks and benefits: Risks, benefits and alternatives were discussed    Consent given by:  Spouse (Louann by phone)  Patient states understanding of procedure being performed: Yes    Patient's understanding of procedure matches consent: Yes    Procedure consent matches procedure scheduled: Yes    Expected level of sedation:  Moderate  Appropriately NPO:  Yes  ASA Class:  3 (CHB, NICM, LBBB)  Mallampati  :  Grade 2- soft palate, base of uvula, tonsillar pillars, and portion of posterior pharyngeal wall visible  Lungs:  Lungs clear with good breath sounds bilaterally  Heart:  Other (comment)  Heart exam comment:  Markedly merlin  History & Physical reviewed:  History and physical reviewed and no updates needed  Statement of review:  I have reviewed the lab findings, diagnostic data, medications, and the plan for sedation    Patient has a DNR/DNI and patient's wife Louann understands this will be placed on hold for today's procedure

## 2022-12-16 NOTE — PLAN OF CARE
Problem: Dysrhythmia  Goal: Normalized Cardiac Rhythm  Outcome: Not Progressing   Goal Outcome Evaluation:    Admission  Diagnosis:  3rd Complete Heart Block.  Admitted from: [facility]  Via: stretcher  Accompanied by: Self.  Belongings: Placed in closet; valuables sent home with family.  Admission paperwork: complete.  Teaching: Call don't fall, use of console, meal times, visiting hours, orientation to unit, when to call for the RN (angina/sob/dizzyness, etc.), and the importance of safety.  Primofit initiated for urine incontinence.  Stool Incontinence X1  Rhythm: 3rd Complete Heart Block.  Pacing pads placed, positioned on patient.   Access: PIV  Telemetry: Placed on pt  Ht./Wt.: complete

## 2022-12-16 NOTE — PROGRESS NOTES
[facility]    Medicine Progress Note - Hospitalist Service    Date of Admission:  12/14/2022    Assessment & Plan   Billie Garcias is a 89-year-old man with history of Alzheimer's dementia, COPD, and HFrEF admitted on 12/14/2022 due to syncope and suspected complete heart block    Echocardiogram 10/26/2020 revealed LVEF of 30%.  Cardiologist recommended pacemaker placement, which family has agreed to.  Will get pacemaker this afternoon.    Syncope due to complete heart block with symptomatic bradycardia  Plan for pacemaker placement today  Avoid AV shun blocking agent  Notify cardiologist if HR < 30 or if patient becomes hypotensive   Continue to hold digoxin  Continue telemetry  Follow-up echocardiogram  Possible work-up for myocardial ischemia depending on clinical course as per cardiologist.  Keep transcutaneous pacing pads in place    HFrEF  Not on diuretic therapy PTA   Hold PTA digoxin  Continue PTA lisinopril 5 mg daily  Follow-up echocardiogram  Cardiology [id number] assistance    Hypoglycemia  Oral intake as tolerated  Monitor for hypoglycemia and correct as per protocol    Hyponatremia  Possibly related to heart failure  Monitor sodium level    COPD  Not in acute exacerbation  Continue PTA Breo Ellipta  DuoNebs as needed  Supplemental oxygen as needed    Alzheimer's dementia  At risk of postprocedure delirium, will monitor  Oriented to self only on exam today       Diet: Regular Diet Adult  NPO for Medical/Clinical Reasons Except for: Other; Specify: May take medications with a drink of water prior to Electrophysiology study    DVT Prophylaxis: Enoxaparin (Lovenox) SQ  Dejesus Catheter: Not present  Central Lines: None  Cardiac Monitoring: ACTIVE order. Indication: Bradycardias (48 hours)  Code Status: No CPR- Do NOT Intubate      Disposition Plan      Expected Discharge Date: 12/17/2022        Discharge Comments: 12/16:  pacemaker placement at 3:30 today        The  patient's care was discussed with the Patient.    Kathy Gore MD  Hospitalist Service  [facility]  Securely message with the [employer] Web Console (learn more here)  Text page via [email or web address] Paging/Directory         Clinically Significant Risk Factors         # Hyponatremia: Lowest Na = 133 mmol/L in last 2 days, will monitor as appropriate                        ______________________________________________________________________    Interval History   Mr. Gracias seem to be doing okay today.  He was confused when I saw him and only oriented to self.  He was somewhat oriented to the month he first guessed November and then January.  He could not tell me where he was or who the president was.  He will go for pacemaker placement today.  Will monitor for delirium post procedure.    Data reviewed today: I reviewed all medications, new labs and imaging results over the last 24 hours. I personally reviewed no images or EKG's today.    Physical Exam   Vital Signs: Temp: 97.5  F (36.4  C) Temp src: Oral BP: 135/62 Pulse: (!) 35   Resp: 20 SpO2: 95 % O2 Device: None (Room air)    Weight: 160 lbs 12.8 oz  General Appearance: Awake, alert, in no acute distress, oriented to self only  Respiratory: CTAB, no wheeze  Cardiovascular: Bradycardia, no murmur noted  GI: soft, nontender, non distended, normal bowel sounds  Skin: no jaundice, no rash      Data   Recent Labs   Lab 12/15/22  1658 12/15/22  0804 12/14/22  1533 12/14/22  1449   WBC  --   --  14.1*  --    HGB  --   --  12.8*  --    MCV  --   --  100  --    PLT  --   --  283  --    NA  --  139  --  133*   POTASSIUM  --  4.2  --  4.9   CHLORIDE  --  104  --  98   CO2  --  24  --  24   BUN  --  32.7*  --  27.3*   CR  --  1.08  --  0.94   ANIONGAP  --  11  --  11   GHAZAL  --  8.4  --  8.8   GLC 96 65*  --  104*     No results found for this or any previous visit (from the past 24 hour(s)).  Medications     dexmedetomidine       - MEDICATION INSTRUCTIONS  -       - MEDICATION INSTRUCTIONS -       - MEDICATION INSTRUCTIONS -       - MEDICATION INSTRUCTIONS -       - MEDICATION INSTRUCTIONS -       sodium chloride         ceFAZolin  2 g Intravenous Pre-Op/Pre-procedure x 1 dose     [Held by provider] enoxaparin ANTICOAGULANT  40 mg Subcutaneous Q24H     fluticasone-vilanterol  1 puff Inhalation Daily     lisinopril  5 mg Oral At Bedtime     sodium chloride (PF)  3 mL Intracatheter Q8H     sodium chloride (PF)  3 mL Intracatheter Q8H     sodium chloride (PF)  3 mL Intracatheter Q8H

## 2022-12-16 NOTE — PROGRESS NOTES
Patient is alert and oriented to self only. Pt kept on NPO prior to procedure. Bed changed with working bed alarm. Lovenox on hold per order. Clipping done on surgical site per order. Procedure explained to pt but unable to comprehend due to decrease cognition.

## 2022-12-17 NOTE — PLAN OF CARE
Problem: Plan of Care - These are the overarching goals to be used throughout the patient stay.    Goal: Absence of Hospital-Acquired Illness or Injury  Outcome: Progressing  Intervention: Identify and Manage Fall Risk  Recent Flowsheet Documentation  Taken 12/17/2022 0000 by Evelyn Gibbons RN  Safety Promotion/Fall Prevention:   activity supervised   bed alarm on   room near nurse's station   room door open   lighting adjusted   increase visualization of patient   increased rounding and observation  Taken 12/16/2022 2015 by Evelyn Gibbons RN  Safety Promotion/Fall Prevention:   activity supervised   bed alarm on   room near nurse's station   room door open   lighting adjusted   increase visualization of patient   increased rounding and observation     Problem: Plan of Care - These are the overarching goals to be used throughout the patient stay.    Goal: Optimal Comfort and Wellbeing  Outcome: Progressing     Problem: Risk for Delirium  Goal: Optimal Coping  Outcome: Progressing     Problem: Dysrhythmia  Goal: Normalized Cardiac Rhythm  Outcome: Progressing   Goal Outcome Evaluation:             Pt. Denies pain, sleeping comfortably  Bed alarm on, call light in reach, room door open  Pt. Did set off bed alarm at 0000 and had pulled off tele monitor and top gauze dressing on pacer site. Steri strips still intact, and surgical site still WDL  Dressing replaced. Pt. Was able to be re-orientated after a few minutes; he kept thinking he was at home and needed to go to the bathroom.    Pt.'s wife also called on evening shift and said that she is interested in TCU for pt. Per she thinks he may need it.     Otherwise grouping cares to help promote sleep/prevent delirium

## 2022-12-17 NOTE — PROGRESS NOTES
12/17/22 1350   Appointment Info   Signing Clinician's Name / Credentials (PT) Deepti Payan PT   Rehab Comments (PT) Up in chair,wanted to go back to bed after PT session   Living Environment   People in Home spouse   Current Living Arrangements house   Home Accessibility stairs to enter home;stairs within home   Number of Stairs, Main Entrance 5  (from garage)   Stair Railings, Main Entrance railings safe and in good condition   Number of Stairs, Within Home, Primary greater than 10 stairs  (3 story home)   Stair Railings, Within Home, Primary railing on left side (ascending)   Transportation Anticipated family or friend will provide;health plan transportation   Self-Care   Usual Activity Tolerance moderate   Current Activity Tolerance moderate   Equipment Currently Used at Home none   Activity/Exercise/Self-Care Comment per chart wife is a caregiver ,pt gets assist with all ADL.Sleeps a lot at home.Patient unreliable historian  with answers.Wife having more difficulty carring for pt.   General Information   Onset of Illness/Injury or Date of Surgery 12/14/22   Referring Physician Kathy Gore   Patient/Family Therapy Goals Statement (PT) did not state   Pertinent History of Current Problem (include personal factors and/or comorbidities that impact the POC) syncope,pacemaker place,ment 12/16   Existing Precautions/Restrictions cardiac;pacemaker   Weight-Bearing Status - RUE other (see comments)  (limited use of L arm)   Weight-Bearing Status - LLE full weight-bearing   Weight-Bearing Status - RLE full weight-bearing   General Observations cooperative   Cognition   Affect/Mental Status (Cognition) confused   Orientation Status (Cognition) disoriented to;place;time;situation   Pain Assessment   Patient Currently in Pain Yes, see Vital Sign flowsheet   Range of Motion (ROM)   Range of Motion ROM is WFL   Strength (Manual Muscle Testing)   Strength (Manual Muscle Testing) strength is WFL   Bed Mobility    Supine-Sit [place] (Bed Mobility) supervision;verbal cues   Sit-Supine [place] (Bed Mobility) supervision;verbal cues   Comment, (Bed Mobility) cues for pacemeker precautions L arm   Sit-Stand Transfer   Sit-Stand [place] (Transfers) verbal cues;minimum assist (75% patient effort)   Assistive Device (Sit-Stand Transfers) walker, front-wheeled   Comment, (Sit-Stand Transfer) cuse for pacemaker precautions and hands placement   Stand-Sit Transfer   Stand-Sit [place] (Transfers) verbal cues;contact guard   Assistive Device (Stand-Sit Transfers) walker, front-wheeled   Comment, (Stand-Sit Transfer) cues for pacemeker precautions and safety with FWW /hands placement   Gait/Stairs (Locomotion)   [place] Level (Gait) verbal cues;contact guard  (cues for FWW safety -to stay close and inside FWW especially with turns)   Assistive Device (Gait) walker, front-wheeled   Distance in Feet 120 feet   Distance in Feet (Gait) 40 feet   Pattern (Gait) swing-through   Deviations/Abnormal Patterns (Gait) base of support, narrow;esteban decreased;festinating/shuffling;gait speed decreased;stride length decreased   Maintains Weight-bearing Status (Gait) able to maintain   Comment, (Gait/Stairs) flexed forward posture   Clinical Impression   Criteria for Skilled Therapeutic Intervention Yes, treatment indicated   PT Diagnosis (PT) impaired functional mobility   Influenced by the following impairments pacemaker precautions,cognition   Functional limitations due to impairments transfers,bed mobility ,gait   Clinical Presentation (PT Evaluation Complexity) Stable/Uncomplicated   Clinical Presentation Rationale pr presents as med diagnosed   Clinical Decision Making (Complexity) low complexity   Planned Therapy Interventions (PT) bed mobility training;gait training;stair training;strengthening;transfer training   Anticipated Equipment Needs at Discharge (PT) walker, rolling   Risk & Benefits of therapy have been  explained evaluation/treatment results reviewed;patient   Clinical Impression Comments Patient is a 89 yo male with dementia admitted from home after syncopal episode ,had pacemeker placed on 12/16.Presents with slight mobility deficits due to pacemaker restrictions.Appropriate for sklled pT to improve functional mobility and ensure compliance with pacemker restrictions-pt has dementia at baseline.   PT Total Evaluation Time   PT Eval, Low Complexity Minutes (29329) 15   Physical Therapy Goals   PT Frequency Daily   PT Predicted Duration/Target Date for Goal Attainment 12/24/22   PT Goals Bed Mobility;Transfers;Gait;Stairs   PT: Bed Mobility Independent;Supine to/from sit;Within precautions   PT: Transfers Supervision/stand-by assist;Sit to/from stand;Assistive device;Within precautions   PT: Gait Supervision/stand-by assist;Rolling walker;Greater than 200 feet   PT: Stairs Minimal assist;5 stairs;Rail on left   Gait Training   Gait Training Minutes (43052) 10   Symptoms Noted During/After Treatment (Gait Training) fatigue   Treatment Detail/Skilled Intervention amb out on hallway,slightly unsteady initially -improved with the distance   [place] Level (Gait Training) minimum assist (75% patient effort)   Physical Assistance Level (Gait Training) verbal cues;1 person assist;supervision   Weight Bearing (Gait Training) full weight-bearing   Assistive Device (Gait Training) other (see comments)  (none)   Pattern Analysis (Gait Training) swing-through gait   Gait Analysis Deviations decreased step length;decreased toe-to-floor clearance;decreased stride length;decreased weight-shifting ability   Impairments (Gait Analysis/Training) balance impaired   PT Discharge Planning   PT Plan distance amb with FWW ,bed mobility ,transfers with pacemaker precautions,stairs when ready   PT Discharge Recommendation (DC Rec) Transitional Care Facility;Long term care facility   PT Rationale for DC Rec wife having difficulty  kayy for pt -pt may need LTC vs Memory Care at d/c   PT Brief overview of current status min assist for mobility with reminders for pacemaker precautions.Amb 120 feet with FWW ,CGA.   Total Session Time   Timed Code Treatment Minutes 10   Total Session Time (sum of timed and untimed services) 25

## 2022-12-17 NOTE — PLAN OF CARE
"  Problem: Plan of Care - These are the overarching goals to be used throughout the patient stay.    Goal: Plan of Care Review  Description: The Plan of Care Review/Shift note should be completed every shift.  The Outcome Evaluation is a brief statement about your assessment that the patient is improving, declining, or no change.  This information will be displayed automatically on your shift note.  Outcome: Progressing  Goal: Patient-Specific Goal (Individualized)  Description: You can add care plan individualizations to a care plan. Examples of Individualization might be:  \"Parent requests to be called daily at 9am for status\", \"I have a hard time hearing out of my right ear\", or \"Do not touch me to wake me up as it startles me\".  Outcome: Progressing  Goal: Absence of Hospital-Acquired Illness or Injury  Outcome: Progressing  Intervention: Identify and Manage Fall Risk  Recent Flowsheet Documentation  Taken 12/17/2022 1000 by Vikram Gibson RN  Safety Promotion/Fall Prevention:   activity supervised   bed alarm on   room near nurse's station   room door open   lighting adjusted   increase visualization of patient   increased rounding and observation  Intervention: Prevent Skin Injury  Recent Flowsheet Documentation  Taken 12/17/2022 1151 by Vikram Gibson, RN  Body Position: supine, head elevated  Taken 12/17/2022 0821 by Vikram Gibson RN  Body Position: supine, head elevated  Goal: Optimal Comfort and Wellbeing  Outcome: Progressing  Goal: Readiness for Transition of Care  Outcome: Progressing     Problem: Risk for Delirium  Goal: Optimal Coping  Outcome: Progressing  Goal: Improved Behavioral Control  Outcome: Progressing  Intervention: Minimize Safety Risk  Recent Flowsheet Documentation  Taken 12/17/2022 1000 by Vikram Gibson, RN  Enhanced Safety Measures: bed alarm set  Goal: Improved Attention and Thought Clarity  Outcome: Progressing  Intervention: Maximize Cognitive Function  Recent Flowsheet " Documentation  Taken 12/17/2022 1000 by Vikram Gibson RN  Reorientation Measures:   reorientation provided   glasses use encouraged   clock in view  Goal: Improved Sleep  Outcome: Progressing     Problem: Dysrhythmia  Goal: Normalized Cardiac Rhythm  Outcome: Progressing     Problem: Cardiac Rhythm Management Device  Goal: Optimal Adjustment to Device  Outcome: Progressing  Goal: Absence of Bleeding  Outcome: Progressing  Goal: Effective Device Function  Outcome: Progressing  Goal: Absence of Infection Signs and Symptoms  Outcome: Progressing  Goal: Acceptable Pain Level  Outcome: Progressing  Goal: Effective Oxygenation and Ventilation  Outcome: Progressing  Intervention: Optimize Oxygenation and Ventilation  Recent Flowsheet Documentation  Taken 12/17/2022 1151 by Vikram Gibson RN  Cough And Deep Breathing: done with encouragement  Taken 12/17/2022 1000 by Vikram Gibson RN  Cough And Deep Breathing: done with encouragement   Goal Outcome Evaluation:       Pt was sleeping until 12 noon. Night nurse informed pt was up most of the night. Pt opens his eyes spontaneously when called by name. Reorientation done to pt. Surgical site dry and intact, dressing done no hematoma noted. Pt was up in chair. Pt will be working with PT and OT per order.

## 2022-12-17 NOTE — PROGRESS NOTES
Impression and Plan     Impression:   1. Syncope likely due to symptomatic complete heart block status post [employer] dual-chamber permanent pacemaker placement 12/16/2022.  2. Chronic congestive heart failure with reduced left ventricular ejection fraction lasted noted to be 30% on 10/26/2020. Unclear etiology but possibly nonischemic induced by left bundle branch block. He does not appear fluid-overloaded and his brain natriuretic peptide is not elevated today.   3. Borderline stable elevated high-sensitivity troponin likely demand-mediated from complete heart block.  4. Chronic obstructive pulmonary disease.  5. Memory issues.  6. Do not resuscitate status.    Plan:    Add metoprolol succinate 25 mg daily    Continue lisinopril 5 mg daily    Recommend physical and occupational therapy evaluation to determine if he is able to discharge to home or needs a transitional care unit/skilled nursing facility    We will arrange for a device check in 1-2 weeks and follow-up with me in 3 months    We will sign off at this time. Please do not hesitate to contact us with additional questions or concerns.    Primary Cardiologist: can establish with me    Subjective     - pacemaker placed yesterday  - tired but denies pain or shortness of breath    Cardiac Diagnostics   Telemetry (personally reviewed): A-V paced    PFTs 11/5/2018 (report reviewed):  Full Pulmonary Function Test is abnormal  Spirometry is consistent with moderate-severe obstructive ventilatory defect.  Spirometry is consistent with reversibility.  There is hyperinflation.  There is air-trapping.  Diffusion capacity when corrected for hemoglobin is moderately reduced.     Echocardiogram 10/26/2020 (results reviewed):   1. Severely reduced LV systolic function. EF 30%.   2. Normal RV size and function.   3. Mild LA dilatation.   4. No significant valvular abnormalities.   5. Compared to prior report (outside study), EF is reduced.     Physical  "Examination       /65 (BP Location: Left arm)   Pulse 60   Temp 97.9  F (36.6  C) (Oral)   Resp 20   Ht 1.854 m (6' 1\")   Wt 50 kg (110 lb 3.2 oz)   SpO2 93%   BMI 14.54 kg/m          Wt Readings from Last 3 Encounters:   12/17/22 50 kg (110 lb 3.2 oz)   02/19/22 57.4 kg (126 lb 9.6 oz)   10/17/19 64.9 kg (143 lb)     Intake/Output Summary (Last 24 hours) at 12/17/2022 1220  Last data filed at 12/17/2022 1100  Gross per 24 hour   Intake 670 ml   Output 200 ml   Net 470 ml       General: pleasant male. No acute distress. Sleepy today.  HENT: external ears normal. Nares patent. Mucous membranes moist.  Eyes: perrla, extraocular muscles intact. No scleral icterus.   Neck: No JVD  Lungs: clear to auscultation  COR: regular rate and rhythm, No murmurs, rubs, or gallops. Left chest wall pacemaker pocket intact with no significant swelling, erythema, or drainage.  Abd: nondistended, BS present  Extrem: No edema         Imaging      CXR 12/16/2022 (report reviewed):  Interval pacemaker placement in left chest with leads overlying the right atrium and right ventricle. No pneumothorax. Expected postoperative subcutaneous emphysema. Lungs are clear. Scattered calcified granulomas again noted. Bones are unchanged.    Lab Results   Lab Results   Component Value Date     12/15/2022    CO2 24 12/15/2022    CO2 25 02/21/2022    BUN 32.7 12/15/2022    BUN 24 02/21/2022     Lab Results   Component Value Date    WBC 9.8 12/17/2022    HGB 11.3 12/17/2022    HCT 35.3 12/17/2022     12/17/2022     12/17/2022     Lab Results   Component Value Date    CHOL 215 04/15/2016    TRIG 87 04/15/2016    HDL 34 04/15/2016     Lab Results   Component Value Date    INR 1.11 01/29/2019     Lab Results   Component Value Date     03/20/2019     Lab Results   Component Value Date    TROPONINI 0.08 01/29/2019    TROPONINI 0.05 01/29/2019    TROPONINI 0.05 01/29/2019     Lab Results   Component Value Date    TSH 0.73 " 01/29/2019           Current Inpatient Scheduled Medications   Scheduled Meds:    ceFAZolin  1 g Intravenous Q8H     [Held by provider] enoxaparin ANTICOAGULANT  40 mg Subcutaneous Q24H     fluticasone-vilanterol  1 puff Inhalation Daily     lisinopril  5 mg Oral At Bedtime     sodium chloride (PF)  3 mL Intracatheter Q8H          Medications Prior to Admission   Prior to Admission medications    Medication Sig Start Date End Date Taking? Authorizing Provider   digoxin (LANOXIN) 125 mcg tablet [DIGOXIN (LANOXIN) 125 MCG TABLET] Take 1 tablet (125 mcg total) by mouth daily with supper. 2/25/19  Yes Jose Antonio Bryant MD   fluticasone-salmeterol (ADVAIR) 250-50 MCG/DOSE inhaler Inhale 1 puff into the lungs 2 times daily   Yes Unknown, Entered By History   lisinopril (PRINIVIL,ZESTRIL) 5 MG tablet [LISINOPRIL (PRINIVIL,ZESTRIL) 5 MG TABLET] Take 1 tablet (5 mg total) by mouth at bedtime. 1/16/19  Yes Edgar Baptiste APRN CNP   pantoprazole (PROTONIX) 40 MG EC tablet Take 1 tablet (40 mg) by mouth every morning (before breakfast)  Patient not taking: Reported on 12/14/2022 2/21/22   Ced Null DO   polyethylene glycol (MIRALAX) 17 g packet Take 17 g by mouth 2 times daily  Patient not taking: Reported on 12/14/2022 2/21/22   Ced Null DO   senna-docusate (SENOKOT-S/PERICOLACE) 8.6-50 MG tablet Take 2 tablets by mouth 2 times daily  Patient not taking: Reported on 12/14/2022 2/21/22   Ced Null DO Brent E. White, MD Merged with [facility]  Non-Invasive Cardiologist  [facility]  Pager [phone]

## 2022-12-17 NOTE — PROGRESS NOTES
"Care Management Follow Up    Length of Stay (days): 3    Expected Discharge Date:  12/19/2022     Concerns to be Addressed:  Post procedure care and monitoring, TCU placement       Patient plan of care discussed at interdisciplinary rounds: Yes    Anticipated Discharge Disposition:  TCU     Anticipated Discharge Services:  TCU    Anticipated Discharge DME:  Per therapy recommendations    Education Provided on the Discharge Plan:  Yes    Patient/Family in Agreement with the Plan:  yes    Referrals Placed by CM/SW:  TCU     Additional Information:  Patient with history of Alzheimer's dementia, COPD, and HFrEF admitted on 12/14/2022 due to syncope and suspected complete heart block. Post [employer] dual-chamber permanent pacemaker placement 12/16/2022.    Patient assist of one, FWW, ETELVINA ambulating 120ft. Recommendation is for TCU.      Social history:  Patient lives with his wife. Per wife she is his primary caregiver. She describes some of their daily routine as she cares for him and states that much of his day is now spent napping/ sleeping. Patient has not been eating much. She reports he typically does not use an assistive device to walk.   Wife describes their relationship and states \"he is like a patient to me, I dont know him anymore\" in relation to his dementia. Wife reports her oldest daughter does provide some support with the house work.   SW discussed options for discharge and possibly adding additional caregiver support or private duty homecare for wife in an effort to keep herself well and reduce burnout. Wife states she has looked in to this. She reports she does intend to continue to care for him as able.     Spouse is primary family contact for discharge planning.    Spoke with spouse who states goal is for patient to get stronger and return home.  She request TCU referrals to [facility] and [facility]. Referrals faxed.          Jana Bah RN        "

## 2022-12-17 NOTE — PLAN OF CARE
Heart Failure Care Map  GOALS TO BE MET BEFORE DISCHARGE:    1. Decrease congestion and/or edema with diuretic therapy to achieve near optimal volume status.     Dyspnea improved: Yes, satisfactory for discharge.   Edema improved: Yes, satisfactory for discharge.        Last 24 hour I/O:   Intake/Output Summary (Last 24 hours) at 12/16/2022 1906  Last data filed at 12/16/2022 1800  Gross per 24 hour   Intake 550 ml   Output 0 ml   Net 550 ml           Net I/O and Weights since admission:   11/16 2300 - 12/16 2259  In: 1050 [P.O.:550]  Out: 0   Net: 1050     Vitals:    12/14/22 1359 12/15/22 2059 12/16/22 0442   Weight: 72.6 kg (160 lb) 72.6 kg (160 lb) 72.9 kg (160 lb 12.8 oz)       2.  O2 sats > 90% on room air, or at prior home O2 therapy level.      Able to wean O2 this shift to keep sats above 90%?: No, further care required to meet this goal. Please explain unable to get info from pt   Does patient use Home O2? No          Current oxygenation status:   SpO2: 96 %     O2 Device: None (Room air), Oxygen Delivery: 2 LPM    3.  Tolerates ambulation and mobility near baseline.     Ambulation: No, further care required to meet this goal. Please explain bedrest   Times patient ambulated with staff this shift: 0    Please review the Heart Failure Care Map for additional HF goal outcomes.    Patient came back from pacemaker placement. Pt on bedrest for to hours(due at 2000H). Pt able to tolerate clear liquids (550 ml total). Pt is Vpaced 100% paced. Denies SOB, chest pain and dizziness. Surgical site is dry and intact. Reoriented patient to keep affected arm at rest.    Vikram Gibson, CHUYITA  12/16/2022

## 2022-12-18 NOTE — PROGRESS NOTES
[facility]    Medicine Progress Note - Hospitalist Service    Date of Admission:  12/14/2022    Assessment & Plan   Billie Garcias is a 89-year-old man with history of Alzheimer's dementia, COPD, and HFrEF admitted on 12/14/2022 due to syncope and suspected complete heart block    Echocardiogram 10/26/2020 revealed LVEF of 30%.  Cardiologist recommended pacemaker placement, which family has agreed to.  Will get pacemaker this afternoon.    Syncope due to complete heart block with symptomatic bradycardia s/p pacemaker placement 12/16  Discontinued digoxin  Discontinued telemetry  Metoprolol 25 mg daily  Per cardiology device check in 1-2 weeks with follow up in 3 months    HFrEF  Not on diuretic therapy PTA   Hold PTA digoxin  Continue PTA lisinopril 5 mg daily  Cardiology hsrrrp-[id number] assistance, have signed off    Hypoglycemia  Oral intake as tolerated  Monitor for hypoglycemia and correct as per protocol    Hyponatremia- resolved  Possibly related to heart failure  Monitor sodium level    COPD  Not in acute exacerbation  Continue PTA Breo Ellipta  DuoNebs as needed  Supplemental oxygen as needed    Alzheimer's dementia  At risk of postprocedure delirium, will monitor  Oriented to self only on exam today    Cachexia  Failure to thrive   BMI 14       Diet: Combination Diet Regular Diet Adult  Room Service    DVT Prophylaxis: Pneumatic Compression Devices  Dejesus Catheter: Not present  Central Lines: None  Cardiac Monitoring: ACTIVE order. Indication: Post- ICD or pacemaker placement (48 hours)  Code Status: No CPR- Do NOT Intubate      Disposition Plan      Expected Discharge Date: 12/19/2022    Discharge Delays: Placement - TCU    Discharge Comments: 12/16:  pacemaker placement at 3:30 today  12/17, Needs ? TCU at [place]  IV ATBS x3        The patient's care was discussed with the Patient and Patient's Family.    Kahty Gore MD  Hospitalist Service  [facility]  "Hospital  Securely message with the Vocera Web Console (learn more here)  Text page via [employer] Paging/Directory         Clinically Significant Risk Factors                        # Cachexia: Estimated body mass index is 14.62 kg/m  as calculated from the following:    Height as of this encounter: 1.854 m (6' 1\").    Weight as of this encounter: 50.3 kg (110 lb 12.8 oz)., PRESENT ON ADMISSION         ______________________________________________________________________    Interval History   Mr. Garcias is doing well today.  He is not having any complaints.  I did update his wife this afternoon.  She is having some issues of her own with hyperglycemia and a broken fridge.  Plans for TCU placement at discharge.  Borderline blood pressures today.    Data reviewed today: I reviewed all medications, new labs and imaging results over the last 24 hours. I personally reviewed no images or EKG's today.    Physical Exam   Vital Signs: Temp: 97.6  F (36.4  C) Temp src: Oral BP: 134/73 Pulse: 61   Resp: 20 SpO2: 92 % O2 Device: None (Room air)    Weight: 110 lbs 12.8 oz  General Appearance: Awake, alert, in no acute distress, frail  Respiratory: CTAB, no wheeze  Cardiovascular: RRR, no murmur noted  GI: soft, nontender, non distended, normal bowel sounds  Skin: no jaundice, petechiae and erythema on chest       Data   Recent Labs   Lab 12/17/22  0629 12/15/22  1658 12/15/22  0804 12/14/22  1533 12/14/22  1449   WBC 9.8  --   --  14.1*  --    HGB 11.3*  --   --  12.8*  --    *  --   --  100  --      --   --  283  --    NA  --   --  139  --  133*   POTASSIUM  --   --  4.2  --  4.9   CHLORIDE  --   --  104  --  98   CO2  --   --  24  --  24   BUN  --   --  32.7*  --  27.3*   CR  --   --  1.08  --  0.94   ANIONGAP  --   --  11  --  11   GHAZAL  --   --  8.4  --  8.8   GLC  --  96 65*  --  104*     Medications     - MEDICATION INSTRUCTIONS -         [Held by provider] enoxaparin ANTICOAGULANT  40 mg Subcutaneous Q24H     " fluticasone-vilanterol  1 puff Inhalation Daily     lisinopril  5 mg Oral At Bedtime     metoprolol succinate ER  25 mg Oral Daily     sodium chloride (PF)  3 mL Intracatheter Q8H

## 2022-12-18 NOTE — PROGRESS NOTES
[facility]    Medicine Progress Note - Hospitalist Service    Date of Admission:  12/14/2022    Assessment & Plan   Billie Garcias is a 89-year-old man with history of Alzheimer's dementia, COPD, and HFrEF admitted on 12/14/2022 due to syncope and suspected complete heart block    Echocardiogram 10/26/2020 revealed LVEF of 30%.  Cardiologist recommended pacemaker placement, which family has agreed to.  Will get pacemaker this afternoon.    Syncope due to complete heart block with symptomatic bradycardia s/p pacemaker placement 12/16  Avoid AV shun blocking agent  Notify cardiologist if HR < 30 or if patient becomes hypotensive   Continue to hold digoxin  Continue telemetry  Follow-up echocardiogram  Possible work-up for myocardial ischemia depending on clinical course as per cardiologist.  Keep transcutaneous pacing pads in place    HFrEF  Not on diuretic therapy PTA   Hold PTA digoxin  Continue PTA lisinopril 5 mg daily  Follow-up echocardiogram  Cardiology hwvywu-hn-eyibeswzha assistance    Hypoglycemia  Oral intake as tolerated  Monitor for hypoglycemia and correct as per protocol    Hyponatremia  Possibly related to heart failure  Monitor sodium level    COPD  Not in acute exacerbation  Continue PTA Breo Ellipta  DuoNebs as needed  Supplemental oxygen as needed    Alzheimer's dementia  At risk of postprocedure delirium, will monitor  Oriented to self only on exam today    Cachexia  Failure to thrive   BMI 14     Diet: Combination Diet Regular Diet Adult  Room Service    DVT Prophylaxis: Pneumatic Compression Devices  Dejesus Catheter: Not present  Central Lines: None  Cardiac Monitoring: ACTIVE order. Indication: Post- ICD or pacemaker placement (48 hours)  Code Status: No CPR- Do NOT Intubate      Disposition Plan      Expected Discharge Date: 12/19/2022    Discharge Delays: Placement - TCU    Discharge Comments: 12/16:  pacemaker placement at 3:30 today  12/17, Needs ? TCU at [place]  IV  "ATBS x3        The patient's care was discussed with the Patient.    Kathy Gore MD  Hospitalist Service  [facility]  Securely message with the Vocera Web Console (learn more here)  Text page via [employer] Paging/Directory         Clinically Significant Risk Factors                        # Cachexia: Estimated body mass index is 14.54 kg/m  as calculated from the following:    Height as of this encounter: 1.854 m (6' 1\").    Weight as of this encounter: 50 kg (110 lb 3.2 oz)., PRESENT ON ADMISSION         ______________________________________________________________________    Interval History   Mr. Garcias is doing well today.  He was oriented to person and place today.  He was not able to give me the month but was watching football and able to interact with that in the usual manner.  He was not having any complaints.  He does have a lot of fatigue and redness across the chest likely from the procedure yesterday.    Data reviewed today: I reviewed all medications, new labs and imaging results over the last 24 hours. I personally reviewed no images or EKG's today.    Physical Exam   Vital Signs: Temp: 97.9  F (36.6  C) Temp src: Oral BP: 115/58 Pulse: 63   Resp: 20 SpO2: 97 % O2 Device: None (Room air)    Weight: 110 lbs 3.2 oz  General Appearance: Awake, alert, in no acute distress, frail  Respiratory: CTAB, no wheeze  Cardiovascular: RRR, no murmur noted  GI: soft, nontender, non distended, normal bowel sounds  Skin: no jaundice, petechiae and erythema across the chest      Data   Recent Labs   Lab 12/17/22  0629 12/15/22  1658 12/15/22  0804 12/14/22  1533 12/14/22  1449   WBC 9.8  --   --  14.1*  --    HGB 11.3*  --   --  12.8*  --    *  --   --  100  --      --   --  283  --    NA  --   --  139  --  133*   POTASSIUM  --   --  4.2  --  4.9   CHLORIDE  --   --  104  --  98   CO2  --   --  24  --  24   BUN  --   --  32.7*  --  27.3*   CR  --   --  1.08  --  0.94 "   ANIONGAP  --   --  11  --  11   GHAZAL  --   --  8.4  --  8.8   GLC  --  96 65*  --  104*     No results found for this or any previous visit (from the past 24 hour(s)).  Medications     - MEDICATION INSTRUCTIONS -         [Held by provider] enoxaparin ANTICOAGULANT  40 mg Subcutaneous Q24H     fluticasone-vilanterol  1 puff Inhalation Daily     lisinopril  5 mg Oral At Bedtime     metoprolol succinate ER  25 mg Oral Daily     sodium chloride (PF)  3 mL Intracatheter Q8H

## 2022-12-18 NOTE — PLAN OF CARE
Problem: Plan of Care - These are the overarching goals to be used throughout the patient stay.    Goal: Absence of Hospital-Acquired Illness or Injury  Intervention: Identify and Manage Fall Risk  Recent Flowsheet Documentation  Taken 12/18/2022 0020 by Evelyn Gibbons, RN  Safety Promotion/Fall Prevention:   activity supervised   bed alarm on   clutter free environment maintained   fall prevention program maintained   increased rounding and observation   increase visualization of patient   lighting adjusted   mobility aid in reach   nonskid shoes/slippers when out of bed   room door open   room near nurse's station  Taken 12/17/2022 2100 by Evelyn Gibbons, RN  Safety Promotion/Fall Prevention:   activity supervised   bed alarm on   clutter free environment maintained   fall prevention program maintained   increased rounding and observation   increase visualization of patient   lighting adjusted   mobility aid in reach   nonskid shoes/slippers when out of bed   room door open   room near nurse's station     Problem: Plan of Care - These are the overarching goals to be used throughout the patient stay.    Goal: Optimal Comfort and Wellbeing  Outcome: Progressing     Problem: Risk for Delirium  Goal: Optimal Coping  Outcome: Progressing     Problem: Risk for Delirium  Goal: Improved Attention and Thought Clarity  Outcome: Progressing  Intervention: Maximize Cognitive Function  Recent Flowsheet Documentation  Taken 12/18/2022 0020 by Evelyn Gibbons, RN  Reorientation Measures:   clock in view   glasses use encouraged  Taken 12/17/2022 2100 by Evelyn Gibbons, RN  Reorientation Measures:   clock in view   glasses use encouraged   Goal Outcome Evaluation:       Pt. More alert/orientated tonight, follows commands appropriately   Bed alarm on, call light in reach  Up to BR with Ax1 with walker and gait belt.   Pt. Sleeping well tonight, grouping cares to help promote sleep  Pacer site  WDL, dressing clean, dry and intact

## 2022-12-18 NOTE — PROGRESS NOTES
12/18/22 1150   Appointment Info   Signing Clinician's Name / Credentials (OT) Vesna Swenson, OTR/L   Living Environment   People in Home spouse   Current Living Arrangements house   Home Accessibility stairs to enter home;stairs within home   Number of Stairs, Main Entrance 5   Stair Railings, Main Entrance railings safe and in good condition   Number of Stairs, Within Home, Primary greater than 10 stairs   Stair Railings, Within Home, Primary railing on left side (ascending)   Transportation Anticipated family or friend will provide;health plan transportation   Self-Care   Usual Activity Tolerance moderate   Current Activity Tolerance moderate   Equipment Currently Used at Home none   Activity/Exercise/Self-Care Comment owns a walker, per chart wife is a caregiver ,pt gets assist with all ADL.Sleeps a lot at home.Patient unreliable historian  with answers.Wife having more difficulty carring for pt.  Wife not present.   Instrumental Activities of Daily Living (IADL)   IADL Comments wife provides IADL cares   General Information   Onset of Illness/Injury or Date of Surgery 12/14/22   Referring Physician Erlin Srinivasan MD   Patient/Family Therapy Goal Statement (OT) pt would like to go home at [place].  family not present   Existing Precautions/Restrictions fall;pacemaker   Left Upper Extremity (Weight-bearing Status) partial weight-bearing (PWB)   General Observations and Info following pacemaker precautions without reminders   Cognitive Status Examination   Orientation Status person   Affect/Mental Status (Cognitive) WNL   Follows Commands WFL   Memory Deficit moderate deficit   Cognitive Status Comments requires time to respond and simple commands   Strength Comprehensive (MMT)   Comment, General Manual Muscle Testing (MMT) Assessment WFL   Bed Mobility   Bed Mobility supine-sit;sit-supine   Supine-Sit Potter (Bed Mobility) supervision   Sit-Supine [place] (Bed Mobility) supervision   Transfers    Transfers sit-stand transfer;bed-chair transfer;toilet transfer   Transfer Skill: Bed to Chair/Chair to Bed   Bed-Chair Carl[place] (Transfers) supervision;contact guard   Assistive Device (Bed-Chair Transfers) rolling walker   Sit-Stand Transfer   Sit-Stand [place] (Transfers) supervision   Assistive Device (Sit-Stand Transfers) walker, standard   Sit/Stand Transfer Comments sl loss of balance without walker.  able to catch self   Toilet Transfer   Type (Toilet Transfer) stand-sit;sit-stand   Carlton Level (Toilet Transfer) supervision;contact guard   Assistive Device (Toilet Transfer) walker, standard   Stand-Sit Transfer   Stand-Sit Carl[place] (Transfers) supervision;contact guard   Assistive Device (Stand-Sit Transfers) walker, standard   Lower Body Dressing Assessment/Training   Carl[place] Level (Lower Body Dressing) supervision;set up   Position (Lower Body Dressing) unsupported sitting   Clinical Impression   Criteria for Skilled Therapeutic Interventions Met (OT) Yes, treatment indicated   OT Diagnosis reduced functional moblity   Influenced by the following impairments pacemaker   OT Problem List-Impairments impacting ADL problems related to;activity tolerance impaired;mobility;post-surgical precautions   Assessment of Occupational Performance 1-3 Performance Deficits   Identified Performance Deficits functional mobility and safety during transfers   Planned Therapy Interventions (OT) ADL retraining;bed mobility training;transfer training;home program guidelines;progressive activity/exercise   Clinical Decision Making Complexity (OT) moderate complexity   Risk & Benefits of therapy have been explained evaluation/treatment results reviewed;care plan/treatment goals reviewed;risks/benefits reviewed;current/potential barriers reviewed;participants voiced agreement with care plan;participants included;patient   OT Total Evaluation Time   OT Eval, Low Complexity Minutes (31677) 10   OT Goals    Therapy Frequency (OT) Daily   OT Predicted Duration/Target Date for Goal Attainment 12/23/22   OT Goals Transfers;Aerobic Activity   OT: Transfer Supervision/stand-by assist   OT: Perform aerobic activity with stable cardiovascular response 10 minutes;ambulation  (standing tolernace, sit to stand from chair)   OT Discharge Planning   OT Plan room moblity with pacer precutions.  endurance bulding tasks, standing tolerance   OT Discharge Recommendation (DC Rec) home with assist;Transitional Care Facility   OT Rationale for DC Rec pt requiring 24 hour supervision and SBA with moblity.  family not present.  If family cannot provide 24 hour supervision TCU recommended and establishing next steps of care, ie memory care   OT Brief overview of current status SBA transfers, bed mob and LE dressing.  follows pacer precautions with minimal cuing   Total Session Time   Total Session Time (sum of timed and untimed services) 10

## 2022-12-19 NOTE — PLAN OF CARE
Problem: Cardiac Rhythm Management Device  Goal: Absence of Bleeding  Outcome: Progressing  Goal: Effective Device Function  Outcome: Progressing   Goal Outcome Evaluation:      Patient stable throughout the shift. Pacer site still covered with large outer dressing, per MD note to be removed 2 days after discharge.     Ate both meals in the bed but did get up to the recliner with therapy and sat there for at least 2 hours. Patient pleasant through the shift. Did not use call light but did let needs known when rounded on. Denied pain. Telemetry reading A-Paced but not @ 100% varies but is set to fire w/HR below 50 BPM.    Awaiting placement at TCU.

## 2022-12-19 NOTE — CONSULTS
[facility]  Palliative Care Consultation Note    Patient: Billie Garcias  Date of Admission:  12/14/2022    Requesting Clinician / Team: Hospitalist  Reason for consult: Goals of care    Impression & Recommendations:  iBllie is recovering from PPM placement and feeling well today. He is oriented only to self and family members, which is his baseline. Family agree that goals of care are comfort-focused (would prefer not to return to hospital if possible). They are interested in outpatient Palliative Care, will place referral.    Billie is ambulatory but has increasing care requirements at home. Louann (wife) is hopeful that after brief TCU stay he could return home. She is not ready for him to move to a facility permanently.     Confirmed DNR/DNI code status and completed POLST over the phone with Louann. Velvet will bring in tomorrow for upload.    Goals of Care: comfort-focused (not comfort care)    Advanced Care Planning:  Advance directive: No  POLST: Completing here  Code status: DNR/DNI  Health care agent/surrogate: Louann (wife) and Velvet (daughter)    Symptom Management:  Not currently managing symptoms    Psychosocial & Spiritual:   - Supported by wife Louann who is primary caregiver at home (she is 87 y/o). Also has 2 daughters, Velvet lives next door to Billie and Louann  - Active Latter-day david, appreciates Spiritual Care    These recommendations have been discussed with Dr. Gore.      Thank you for the opportunity to participate in the care of this patient and family. Our team: will continue to follow.     During regular M-F work hours -- if you are not sure who specifically to contact -- please contact us by calling us directly at the Palliative Care Main Line [phone]    After regular work hours and on weekends/holidays, you can leave a message at [phone]      Assessments:  Billie is a 89 y/o man with dementia, COPD, and HFrEF (EF 30%), admitted  "12/14/2022 with syncope due to complete heartblock now s/p PPM placement 12/16.    Today, the patient was seen for:  Goals of care, intro to Palliative Care    Prognosis, Goals, & Planning:      Functional Status just prior to hospitalization: Palliative Performance Score:       40%- 1. Mainly in bed; 2. Unable to do most activity, extensive disease; 3. Mainly assistance; 4. Normal or reduced; 5. Full or drowsy +/- confusion      Prognosis, Goals, and/or Advance Care Planning were addressed today: Yes        Summary/Comments: No HCD or POLST in place. Per daughter, Billie has always felt he has \"lived a full life\" and does not desire aggressive care      Patient's decision making preferences: unable to assess          Patient has decision-making capacity today for complex decisions: No            I have concerns about the patient/family's health literacy today: No           Patient has a completed Health Care Directive: No.       Code status: No CPR / No Intubation    Coping, Meaning, & Spirituality:   Mood, coping, and/or meaning in the context of serious illness were addressed today: Yes  Summary/Comments: Provided support for family    Social:     Living situation: home with wife    [place] family / caregivers: Louann (wife) is primary caregiver    Current in-home services: none    History of Present Illness:  History gathered today from: patient, family/loved ones, medical chart    Billie is accompanied by his daughter Velvet during my visit today. He is sitting up in bed eating M&M's, which are his favorite treat according to Velvet. Billie knows Velvet and recognizes he is not a home, but is unable to recall events leading up to or during his admission.    Billie tells me he has 3 daughters (actually has 2 per Velvet) and lives with his wife Louann. He used to work at the [employer]. He has an active Religious david. He enjoys TV shows and eating M&Ms. He denies any pain, dyspnea or other symptoms at " this time.    Spoke with daughter Velvet outside the room. She shared that Louann is 88 years old and has struggled to care for Billie at home in recent months. Their home has stairs and he has fallen several times but never sustained a major injury. He does not have a HCD but Louann is his surrogate decision maker. Discussed completing a POLST with Louann, Velvet can help facilitate as mom has not been able to visit in the hospital. Provided Velvet with POLST form that I will follow up with a call.    Called Louann this evening. She is very stressed about managing her diabetes as her insulin pens aren't working. She was struggling to care for Billie up until his hospitalization. She notes he typically sleeps all day. She is hoping that he will go to TCU for a while so that he can get stronger and she can get her medications sorted. I shared my concern that Billie will likely continue to get weaker and need more support with his progressive dementia. Louann expressed understanding but is not ready for Billie to move somewhere permanently, she prefers to care for him at home if possible.    Louann confirmed DNR/DNI code status and general comfort-focused care. She has POLST form from Velvet which we reviewed over the phone. She signed it and will send with Velvet tomorrow. Louann is open to outpatient Palliative Care referral.    Key Palliative Symptom Data:  # Pain severity the last 12 hours: none  # Dyspnea severity the last 12 hours: none  # Nausea severity the last 12 hours: none  # Anxiety severity the last 12 hours: none    ROS:  Comprehensive ROS is reviewed and is negative except as here & per HPI     Past Medical History:  Past Medical History:   Diagnosis Date     Chronic HFrEF (heart failure with reduced ejection fraction) (H)      COPD (chronic obstructive pulmonary disease) (H)      LBBB (left bundle branch block)         Past Surgical History:  Past Surgical History:   Procedure Laterality  Date     ESOPHAGOSCOPY, GASTROSCOPY, DUODENOSCOPY (EGD), COMBINED N/A 2022    Procedure: ESOPHAGOGASTRODUODENOSCOPY (EGD) WITH BIOPSIES;  Surgeon: Daniel Guzman DO;  Location: [facility] OR     HEMORRHOID SURGERY       VA ESOPHAGOGASTRODUODENOSCOPY TRANSORAL DIAGNOSTIC N/A 10/21/2018    Procedure: ESOPHAGOGASTRODUODENOSCOPY (EGD) with biopsy;  Surgeon: Masoud Machuca MD;  Location: [facility] GI;  Service: Gastroenterology     SIGMOIDOSCOPY FLEXIBLE N/A 2022    Procedure: SIGMOIDOSCOPY, FLEXIBLE;  Surgeon: Daniel Guzman DO;  Location: [facility] OR     TONSILLECTOMY           Family History:  Family History   Problem Relation Age of Onset     Cancer Mother      Other - See Comments Father 35.00         in a car accident     Diabetes Type 2  Brother      No Known Problems Brother      Cerebrovascular Disease Sister      No Known Problems Sister      Cerebrovascular Disease Sister      No Known Problems Daughter      No Known Problems Daughter      CABG Brother          Allergies:  Allergies   Allergen Reactions     Spironolactone Rash     patient broke out in very bad rash on both arms.         Medications:  I have reviewed this patient's medication profile and medications from this hospitalization.   Noted:  Current Facility-Administered Medications   Medication     acetaminophen (TYLENOL) tablet 650 mg     glucose gel 15-30 g    Or     dextrose 50 % injection 25-50 mL    Or     glucagon injection 1 mg     [Held by provider] enoxaparin ANTICOAGULANT (LOVENOX) injection 40 mg     fluticasone-vilanterol (BREO ELLIPTA) 100-25 MCG/ACT inhaler 1 puff     HOLD: Heparin (IV or Subcutaneous) Post Procedure     ipratropium - albuterol 0.5 mg/2.5 mg/3 mL (DUONEB) neb solution 3 mL     lidocaine (LMX4) cream     lidocaine 1 % 0.1-1 mL     lisinopril (ZESTRIL) tablet 5 mg     melatonin tablet 1 mg     metoprolol succinate ER (TOPROL XL) 24 hr tablet 25 mg     No heparin products for 72 hours  "after device implantation unless approved by the implanting physician     sodium chloride (PF) 0.9% PF flush 2.5 mL     sodium chloride (PF) 0.9% PF flush 3 mL     sodium chloride (PF) 0.9% PF flush 3 mL       PERTINENT PHYSICAL EXAMINATION:  Vital Signs: Blood pressure 120/65, pulse 60, temperature 97.7  F (36.5  C), temperature source Oral, resp. rate 20, height 1.854 m (6' 1\"), weight 50.3 kg (110 lb 12.8 oz), SpO2 95 %.   GENERAL: sitting up in bed, alert and engaged with family  SKIN: Warm and dry   HEENT: +temporal muscle wasting, anicteric sclera, moist mucous membranes  LUNGS: non-labored   ABDOMINAL: soft, non distended, non tender  MUSKL: no gross joint deformities   EXTREMITIES: No edema or cyanosis, pulses 2+ and symmetrical  NEUROLOGIC: A&Ox1-2  PSYCH: calm    Data reviewed:  Recent imaging reviewed, my comments on pertinents:   CXR 12/16  IMPRESSION: Interval pacemaker placement in left chest with leads overlying the right atrium and right ventricle. No pneumothorax. Expected postoperative subcutaneous emphysema. Lungs are clear. Scattered calcified granulomas again noted. Bones are   unchanged.    Recent lab data reviewed, my comments on pertinents:   CMP  Recent Labs   Lab 12/19/22  0450 12/18/22  1829 12/18/22  0505 12/17/22  0629 12/16/22  1915 12/15/22  1658 12/15/22  1206 12/15/22  0804 12/14/22  1713 12/14/22  1449   NA  --   --   --   --   --   --   --  139  --  133*   POTASSIUM  --   --   --   --   --   --   --  4.2  --  4.9   CHLORIDE  --   --   --   --   --   --   --  104  --  98   CO2  --   --   --   --   --   --   --  24  --  24   ANIONGAP  --   --   --   --   --   --   --  11  --  11   GLC  --   --   --   --   --  96  --  65*  --  104*   BUN  --   --   --   --   --   --   --  32.7*  --  27.3*   CR  --   --   --   --   --   --   --  1.08  --  0.94   GFRESTIMATED  --   --   --   --   --   --   --  65  --  77   GHAZAL  --   --   --   --   --   --   --  8.4  --  8.8   MAG 1.8  --  1.9 2.0 2.1  " --    < >  --    < >  --    PHOS 2.7 2.3*  --   --   --   --   --   --   --   --     < > = values in this interval not displayed.     CBC  Recent Labs   Lab 12/17/22  0629 12/14/22  1533   WBC 9.8 14.1*   RBC 3.50* 3.93*   HGB 11.3* 12.8*   HCT 35.3* 39.2*   * 100   MCH 32.3 32.6   MCHC 32.0 32.7   RDW 12.6 12.5    283       TTS: I have personally spent a total of 60 minutes  today on the unit in review of medical record, consultation with the medical providers and assessment of patient today, with more than 50% of this time spent in counseling, coordination of care, and conversation in a family meeting re: diagnostic results, prognosis, symptom management, risks and benefits of management options,  emotional support and development of plan of care.     Nadja Escobar PA-C  [facility], Palliative Care  Dept phone: [phone]  Securely message with the Vocera Web Console

## 2022-12-19 NOTE — PROGRESS NOTES
Care Management Follow Up    Length of Stay (days): 5    Expected Discharge Date: 12/20/2022     Concerns to be Addressed:       Patient plan of care discussed at interdisciplinary rounds: Yes    Anticipated Discharge Disposition:       Anticipated Discharge Services:    Anticipated Discharge DME:      Patient/family educated on Medicare website which has current facility and service quality ratings:    Education Provided on the Discharge Plan:    Patient/Family in Agreement with the Plan:      Referrals Placed by CM/SW:    Private pay costs discussed: Not applicable    Additional Information:  RNCM attempted to place call to patient's wife. Just rings, unable to leave a voicemail, will try again later. Needing to discuss more TCU options and overall patient status/ goal.     Patient declined from 1 tcu option, 1 pending.     CM will continue to follow up on care progression, now medically clear for discharge, and aide in discharge planning.     Salena Gordon RN

## 2022-12-19 NOTE — PLAN OF CARE
Problem: Plan of Care - These are the overarching goals to be used throughout the patient stay.    Goal: Optimal Comfort and Wellbeing  Outcome: Progressing     Problem: Risk for Delirium  Goal: Improved Behavioral Control  Outcome: Progressing  Intervention: Minimize Safety Risk  Recent Flowsheet Documentation  Taken 12/19/2022 0519 by Nichole Brock RN  Enhanced Safety Measures: bed alarm set  Taken 12/19/2022 0001 by Nichole Brock RN  Enhanced Safety Measures: bed alarm set  Taken 12/18/2022 1949 by Nichole Brock RN  Enhanced Safety Measures: bed alarm set     Problem: Risk for Delirium  Goal: Improved Sleep  Outcome: Progressing     Problem: Cardiac Rhythm Management Device  Goal: Effective Oxygenation and Ventilation  Outcome: Progressing  Intervention: Optimize Oxygenation and Ventilation  Recent Flowsheet Documentation  Taken 12/19/2022 0519 by Nichole Brock RN  Cough And Deep Breathing: done with encouragement  Taken 12/19/2022 0001 by Nichole Brock RN  Cough And Deep Breathing: done with encouragement  Taken 12/18/2022 1949 by Nichole Brock RN  Cough And Deep Breathing: done with encouragement     Goal Outcome Evaluation:  VSS this shift.  Pt. Did not complain of pain.  Pt.voiding well when brought to the bathroom and regular intervals.  Intake remains decreased. Improved sleep and impulsiveness.

## 2022-12-20 NOTE — PROGRESS NOTES
Pt is Sabianist. His parents immigrated from [place] with his 3 older brothers. Billie and his 2 younger sisters were born on their farm in [place]. The community had one Sabianist [employer] that Nor[place]ians and Swedes attended. There was also a Gnosticism Orthodox for the [place].    Billie relays that he prays and that he is good with God. His short term memory is poor.  provided support through life review conversation and prayer.    Spiritual Care is available as needed or requested.    SYLVIE Carey.  Palliative Care Team

## 2022-12-20 NOTE — PLAN OF CARE
Problem: Plan of Care - These are the overarching goals to be used throughout the patient stay.    Goal: Absence of Hospital-Acquired Illness or Injury  Intervention: Identify and Manage Fall Risk    Safety Promotion/Fall Prevention:   activity supervised   bed alarm on   nonskid shoes/slippers when out of bed   assistive device/personal items within reach   lighting adjusted   clutter free environment maintained    Intervention: Prevent Skin Injury    Body Position:  Turn and reposition pt [id number] and PRN for comfort and to prevent skin breakdown.

## 2022-12-20 NOTE — PROGRESS NOTES
[facility]    Medicine Progress Note - Hospitalist Service    Date of Admission:  12/14/2022    Assessment & Plan   Billie Garcias is a 89-year-old man with history of Alzheimer's dementia, COPD, and HFrEF admitted on 12/14/2022 due to syncope and suspected complete heart block    Echocardiogram 10/26/2020 revealed LVEF of 30%.  Cardiologist recommended pacemaker placement, which he had 12/16.     Syncope due to complete heart block with symptomatic bradycardia s/p pacemaker placement 12/16  -Discontinued digoxin  -Discontinued telemetry  -Metoprolol 25 mg daily  -Per cardiology device check in 1-2 weeks with follow up in 3 months    HFrEF  -Not on diuretic therapy PTA   -Hold PTA digoxin  -Continue PTA lisinopril 5 mg daily  -Cardiology [id number] assistance, have signed off    Hypoglycemia  -Oral intake as tolerated  -Monitor for hypoglycemia and correct as per protocol    Hyponatremia- resolved  -Possibly related to heart failure  -Monitor sodium level    COPD  -Not in acute exacerbation  -Continue PTA Breo Ellipta  -DuoNebs as needed  -Supplemental oxygen as needed    Alzheimer's dementia  -At risk of postprocedure delirium, will monitor  -Oriented to self +/- place at baseline    Cachexia  Failure to thrive   -BMI 14  -Palliative consulted, appreciate recommendations: family interested in OP palliative, comfort focused care       Diet: Room Service  Combination Diet Regular Diet Adult; No Pork, Other - please comment    DVT Prophylaxis: Pneumatic Compression Devices  Dejesus Catheter: Not present  Central Lines: None  Cardiac Monitoring: None  Code Status: No CPR- Do NOT Intubate      Disposition Plan      Expected Discharge Date: 12/20/2022    Discharge Delays: Placement - TCU  *Medically Ready for Discharge    Discharge Comments: Palliative care consult.  TCU vs LTC vs hospice?        The patient's care was discussed with the Care Coordinator/, Patient and Wife  "and daughter.    Kathy Gore MD  Hospitalist Service  [facility]  Securely message with the ican[employer] Web Console (learn more here)  Text page via [employer] Paging/Directory         Clinically Significant Risk Factors                        # Cachexia: Estimated body mass index is 14.64 kg/m  as calculated from the following:    Height as of this encounter: 1.854 m (6' 1\").    Weight as of this encounter: 50.3 kg (111 lb).          ______________________________________________________________________    Interval History   Mr. Garcias is doing well today.  He can likely go home however there has been an issue with his wife where she is having difficulty with her diabetes and the fridge broke.  Did get excepted to TCU but wife wants him home.  If wife not able to take him at home tomorrow daughter will.  He is not having any complaints and he is oriented to person and self today.    Data reviewed today: I reviewed all medications, new labs and imaging results over the last 24 hours. I personally reviewed no images or EKG's today.    Physical Exam   Vital Signs: Temp: 98.1  F (36.7  C) Temp src: Oral BP: 117/64 Pulse: 50   Resp: 18 SpO2: 93 % O2 Device: None (Room air)    Weight: 111 lbs 0 oz  General Appearance: Awake, alert, in no acute distress, oriented x2 to self and place, frial  Respiratory: CTAB, no wheeze  Cardiovascular: RRR, no murmur noted  GI: soft, nontender, non distended, normal bowel sounds  Skin: no jaundice, petechiae and redness of the chest unchanged      Data   Recent Labs   Lab 12/20/22  0459 12/17/22  0629 12/15/22  1658 12/15/22  0804 12/14/22  1533 12/14/22  1449   WBC  --  9.8  --   --  14.1*  --    HGB  --  11.3*  --   --  12.8*  --    MCV  --  101*  --   --  100  --     233  --   --  283  --    NA  --   --   --  139  --  133*   POTASSIUM  --   --   --  4.2  --  4.9   CHLORIDE  --   --   --  104  --  98   CO2  --   --   --  24  --  24   BUN  --   --   --  " 32.7*  --  27.3*   CR 0.92  --   --  1.08  --  0.94   ANIONGAP  --   --   --  11  --  11   GHAZAL  --   --   --  8.4  --  8.8   GLC  --   --  96 65*  --  104*     Medications     - MEDICATION INSTRUCTIONS -         fluticasone-vilanterol  1 puff Inhalation Daily     lisinopril  5 mg Oral At Bedtime     metoprolol succinate ER  25 mg Oral Daily     sodium chloride (PF)  3 mL Intracatheter Q8H

## 2022-12-20 NOTE — PLAN OF CARE
Problem: Cardiac Rhythm Management Device  Goal: Optimal Adjustment to Device  Outcome: Progressing   Goal Outcome Evaluation:    A&O to self and place earlier in the shift. In the evening only A&O to self. No c/o pain. Ate well. He watched Be Here movies all shift. A1. VSS. Tele- A-paced, SR w/ 1st degree AV block, BBB.

## 2022-12-20 NOTE — PLAN OF CARE
Goal Outcome Evaluation:    Problem: Risk for Delirium  Goal: Improved Attention and Thought Clarity  Outcome: Progressing   Pt is a/o to self and place.  Cares have been clustered to promote adequate rest.     Pacemaker is covered with c/d/I dressing.  AV paced 100 %; pulse consistently 62.  LS are clear/diminished in bilateral lobes; oxygen sats >90% RA. Infrequent cough; congested and non-productive.     Plan: discharge tomorrow back to home.

## 2022-12-20 NOTE — PROGRESS NOTES
[facility]  Palliative Care Chart Check Note    Palliative medicine was consulted for goals of care.    Chart reviewed and recent events noted from preceding day. Briefly checked on patient who was resting comfortably.    Called Velvet (daughter) this afternoon. She has noticed that Billie is more alert since PPM placed. Provided anticipatory guidance regarding his progressive dementia and discussed possibility of PPM deactivation in the future should his quality of life decline. Velvet was appreciative of this information and outpatient Palliative Care referral.    Goals of care: comfort-focused (not comfort care)    Advanced care planning: POLST completed with Louann (wife) and uploaded to chart today    Support: supported by Louann (wife and caregiver) and Velvet (daughter). Appreciate Spiritual Care support.    Symptoms: not currently managing symptoms    Palliative Care will sign off, please reach out with any questions or concerns.    Nadja Escobar PA-C  [facility], Palliative Care  Dept phone: [phone]  Securely message with the Vocera Web Console

## 2022-12-20 NOTE — PROGRESS NOTES
[facility]    Medicine Progress Note - Hospitalist Service    Date of Admission:  12/14/2022    Assessment & Plan   Billie Garcias is a 89-year-old man with history of Alzheimer's dementia, COPD, and HFrEF admitted on 12/14/2022 due to syncope and suspected complete heart block    Echocardiogram 10/26/2020 revealed LVEF of 30%.  Cardiologist recommended pacemaker placement, which family has agreed to.  Will get pacemaker this afternoon.    Syncope due to complete heart block with symptomatic bradycardia s/p pacemaker placement 12/16  Discontinued digoxin  Discontinued telemetry  Metoprolol 25 mg daily  Per cardiology device check in 1-2 weeks with follow up in 3 months    HFrEF  Not on diuretic therapy PTA   Hold PTA digoxin  Continue PTA lisinopril 5 mg daily  Cardiology mtdcwz-gr-lduewvcyss assistance, have signed off    Hypoglycemia  Oral intake as tolerated  Monitor for hypoglycemia and correct as per protocol    Hyponatremia- resolved  Possibly related to heart failure  Monitor sodium level    COPD  Not in acute exacerbation  Continue PTA Breo Ellipta  DuoNebs as needed  Supplemental oxygen as needed    Alzheimer's dementia  At risk of postprocedure delirium, will monitor  Oriented to self only on exam today    Cachexia  Failure to thrive   BMI 14  Palliative consulted, appreciate recommendations: family interested in OP palliative, comfort focused care     Diet: Room Service  Combination Diet Regular Diet Adult; No Pork, Other - please comment    DVT Prophylaxis: Pneumatic Compression Devices  Dejesus Catheter: Not present  Central Lines: None  Cardiac Monitoring: None  Code Status: No CPR- Do NOT Intubate      Disposition Plan      Expected Discharge Date: 12/22/2022    Discharge Delays: Placement - TCU  *Medically Ready for Discharge    Discharge Comments: Palliative care consult.  TCU vs LTC vs hospice?        The patient's care was discussed with the Patient and  "palliative.    Kathy Gore MD  Hospitalist Service  [facility]  Securely message with the [employer] Web Console (learn more here)  Text page via Let Paging/Directory         Clinically Significant Risk Factors                        # Cachexia: Estimated body mass index is 14.62 kg/m  as calculated from the following:    Height as of this encounter: 1.854 m (6' 1\").    Weight as of this encounter: 50.3 kg (110 lb 12.8 oz).          ______________________________________________________________________    Interval History   Mr. Garcias is doing well today. Oriented to self only. No complains. Stated he enjoyed breakfast.     Data reviewed today: I reviewed all medications, new labs and imaging results over the last 24 hours. I personally reviewed no images or EKG's today.    Physical Exam   Vital Signs: Temp: 98  F (36.7  C) Temp src: Oral BP: 110/59 Pulse: 54   Resp: 18 SpO2: 94 % O2 Device: None (Room air)    Weight: 110 lbs 12.8 oz  General Appearance: Awake, alert, in no acute distress, frail  Respiratory: CTAB, no wheeze  Cardiovascular: RRR, no murmur noted  GI: soft, nontender, non distended, normal bowel sounds  Skin: no jaundice, petechiae and redness improving on chest    Data   Recent Labs   Lab 12/17/22  0629 12/15/22  1658 12/15/22  0804 12/14/22  1533 12/14/22  1449   WBC 9.8  --   --  14.1*  --    HGB 11.3*  --   --  12.8*  --    *  --   --  100  --      --   --  283  --    NA  --   --  139  --  133*   POTASSIUM  --   --  4.2  --  4.9   CHLORIDE  --   --  104  --  98   CO2  --   --  24  --  24   BUN  --   --  32.7*  --  27.3*   CR  --   --  1.08  --  0.94   ANIONGAP  --   --  11  --  11   GHAZAL  --   --  8.4  --  8.8   GLC  --  96 65*  --  104*     Medications     - MEDICATION INSTRUCTIONS -         [Held by provider] enoxaparin ANTICOAGULANT  40 mg Subcutaneous Q24H     fluticasone-vilanterol  1 puff Inhalation Daily     lisinopril  5 mg Oral At Bedtime     " metoprolol succinate ER  25 mg Oral Daily     sodium chloride (PF)  3 mL Intracatheter Q8H

## 2022-12-20 NOTE — PLAN OF CARE
Problem: Dysrhythmia  Goal: Normalized Cardiac Rhythm  Outcome: Progressing     Problem: Cardiac Rhythm Management Device  Goal: Optimal Adjustment to Device  Outcome: Progressing   Goal Outcome Evaluation:       Vs stable. He denies any SOB and chest pain.Left incision/dressing   dry and intact, no hematoma noted.  Pt alert to persona and confused x3.  He has been up in chair and tolerated well. No new skin concerns noted.

## 2022-12-21 PROBLEM — R00.1 SINUS BRADYCARDIA: Status: RESOLVED | Noted: 2022-01-01 | Resolved: 2022-01-01

## 2022-12-21 PROBLEM — I44.2 COMPLETE HEART BLOCK (H): Status: ACTIVE | Noted: 2022-01-01

## 2022-12-21 NOTE — PROGRESS NOTES
Discharged home with home care. Patient lives with wife. Daughter picked him up. All personal belongings sent with. Discharge packet with hard script for Metoprolol and Pacemaker booklet/card handed to daughter. All personal belongings sent with patient. Stable.

## 2022-12-21 NOTE — PLAN OF CARE
Physical Therapy Discharge Summary    Reason for therapy discharge:    Discharged to home with home therapy.    Progress towards therapy goal(s). See goals on Care Plan in [facility] electronic health record for goal details.  Goals not met.  Barriers to achieving goals:   Pt was working on the goals yet.  .    Therapy recommendation(s):    Continued therapy is recommended.  Rationale/Recommendations:  to improve mobility and strength. .

## 2022-12-21 NOTE — PLAN OF CARE
Assumed care 2300 to 0730. A&O x 2, disoriented to time & situation. Assist x 1 with a gait belt and walker. Tele is paced. Denies pain. Urinal at bedside, brief in place. Call light within reach, able to make needs known. Bed alarm on for safety.    Problem: Cardiac Rhythm Management Device  Goal: Effective Oxygenation and Ventilation  Outcome: Progressing     Problem: Cardiac Rhythm Management Device  Goal: Acceptable Pain Level  Intervention: Prevent or Manage Pain  Recent Flowsheet Documentation  Taken 12/21/2022 0404 by LALA DAILEY  Pain Management Interventions: rest
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
## 2022-12-21 NOTE — PLAN OF CARE
Occupational Therapy Discharge Summary    Reason for therapy discharge:    Discharged to home with home therapy.    Progress towards therapy goal(s). See goals on Care Plan in [facility] electronic health record for goal details.  Goals partially met.  Barriers to achieving goals:   discharge from facility.    Therapy recommendation(s):    Recommend 24 hour assist

## 2022-12-21 NOTE — PROGRESS NOTES
Care Management Discharge Note    Discharge Date: 12/21/2022       Discharge Disposition: Home, Home Care    Discharge Services:      Discharge DME:      Discharge Transportation: family or friend will provide, health plan transportation    Private pay costs discussed: Not applicable    PAS Confirmation Code:    Patient/family educated on Medicare website which has current facility and service quality ratings:      Education Provided on the Discharge Plan:    Persons Notified of Discharge Plans: Patient/ Family/ Care Team   Patient/Family in Agreement with the Plan: yes    Handoff Referral Completed: Yes    Additional Information:  Patient to discharge home, family to transport, homecare arranged via [facility] for RN/HHA/PT/OT.     Salena Gordon RN

## 2022-12-21 NOTE — DISCHARGE SUMMARY
[facility]    Discharge Summary  Hospitalist    Date of Admission:  12/14/2022  Date of Discharge:  12/21/2022  Discharging Provider: Jose Wren MD, MD    Discharge Diagnoses   Principal Problem:    Syncope w Complete heart block -- S/P PPM 12/16/22  Active Problems:    Dementia, old age, without behavioral disturbance (H)    Chronic systolic CHF     Generalized muscle weakness    Diarrhea, unspecified type      History of Present Illness   89 year old male with a medical history of Alzheimer's disease, COPD, and heart failure who presents to the ED for evaluation of diarrhea and generalized weakness. Patient reports that he fainted this morning when he got up from bed. His wife called EMS. He reports that he has been having diarrhea and feels generally weak. He has been having 3-4 episodes of diarrhea per day. The stool is nonbloody. He reports no associated nausea, vomiting and abdomina pain. He is tto weak to walk with his walker. No fever, cough, SOB and chest pain. In ER, lab test revealed mildly elevated troponin. He has bradycardia with HR at the 30s. EKG with intermittent complete heart block.     Hospital Course   89-year-old man with history of Alzheimer's dementia, COPD, and HFrEF admitted on 12/14/2022 due to syncope and suspected complete heart block     Echocardiogram 10/26/2020 revealed LVEF of 30%.  Cardiologist recommended pacemaker placement, which he had 12/16.      Syncope due to complete heart block with symptomatic bradycardia s/p pacemaker placement 12/16  -Discontinued digoxin  -Discontinued telemetry  -Metoprolol 25 mg daily  -Per cardiology device check in 1-2 weeks with follow up in 3 months     HFrEF  -Not on diuretic therapy PTA   -Hold PTA digoxin  -Continue PTA lisinopril 5 mg daily  -Cardiology fxugzi-jx-lpolsosimh assistance, have signed off     Hypoglycemia  -Oral intake as tolerated  -Monitor for hypoglycemia and correct as per  protocol     Hyponatremia- resolved  -Possibly related to heart failure  -Monitor sodium level     COPD  -Not in acute exacerbation  -Continue PTA Breo Ellipta  -DuoNebs as needed  -Supplemental oxygen as needed     Alzheimer's dementia  -At risk of postprocedure delirium, will monitor  -Oriented to self +/- place at baseline     Cachexia  Failure to thrive   -BMI 14  -Palliative consulted, appreciate recommendations: family interested in OP palliative, comfort focused care    Jose Wren MD  Pager: [phone]  Cell Phone:  [phone]       Significant Results and Procedures   As above    Pending Results   These results will be followed up by Dr. Wren  Unresulted Labs Ordered in the Past 30 Days of this Admission     No orders found from 11/14/2022 to 12/15/2022.          Code Status DNR/DNI       Primary Care Physician   Paramjit Christian    Physical Exam   Temp: 97.6  F (36.4  C) Temp src: Oral BP: 130/65 Pulse: 58   Resp: 18 SpO2: 95 % O2 Device: None (Room air)    Vitals:    12/19/22 0521 12/20/22 0505 12/21/22 0325   Weight: 50.3 kg (110 lb 12.8 oz) 50.3 kg (111 lb) 50 kg (110 lb 4.8 oz)     Vital Signs with Ranges  Temp:  [97.5  F (36.4  C)-98.3  F (36.8  C)] 97.6  F (36.4  C)  Pulse:  [54-69] 58  Resp:  [16-22] 18  BP: ()/(55-65) 130/65  SpO2:  [94 %-96 %] 95 %  I/O last 3 completed shifts:  In: 560 [P.O.:560]  Out: 550 [Urine:550]    Exam on discharge:   Alert, Ox1, pleasant    Discharge Disposition   Discharged to home  Condition at discharge: Fair    Consultations This Hospital Stay   CARE MANAGEMENT / SOCIAL WORK IP CONSULT  CARDIOLOGY IP CONSULT  PHYSICAL THERAPY ADULT IP CONSULT  OCCUPATIONAL THERAPY ADULT IP CONSULT  PALLIATIVE CARE ADULT IP CONSULT  SPIRITUAL HEALTH SERVICES IP CONSULT    Time Spent on this Encounter   I spent a total of 35 minutes discharging this patient.     Discharge Orders     Discharge Medications   Current Discharge Medication List      START taking  these medications    Details   acetaminophen (TYLENOL) 325 MG tablet Take 2 tablets (650 mg) by mouth every 4 hours as needed for other (mild pain (1-3))  Refills: 0    Associated Diagnoses: Pain      metoprolol succinate ER (TOPROL XL) 25 MG 24 hr tablet Take 1 tablet (25 mg) by mouth daily  Qty: 30 tablet, Refills: 3    Associated Diagnoses: Chronic HFrEF (heart failure with reduced ejection fraction) (H)         CONTINUE these medications which have NOT CHANGED    Details   fluticasone-salmeterol (ADVAIR) 250-50 MCG/DOSE inhaler Inhale 1 puff into the lungs 2 times daily      lisinopril (PRINIVIL,ZESTRIL) 5 MG tablet [LISINOPRIL (PRINIVIL,ZESTRIL) 5 MG TABLET] Take 1 tablet (5 mg total) by mouth at bedtime.  Qty: 90 tablet, Refills: 3    Associated Diagnoses: Dilated cardiomyopathy (H)         STOP taking these medications       digoxin (LANOXIN) 125 mcg tablet Comments:   Reason for Stopping:         pantoprazole (PROTONIX) 40 MG EC tablet Comments:   Reason for Stopping:         polyethylene glycol (MIRALAX) 17 g packet Comments:   Reason for Stopping:         senna-docusate (SENOKOT-S/PERICOLACE) 8.6-50 MG tablet Comments:   Reason for Stopping:             Allergies   Allergies   Allergen Reactions     Spironolactone Rash     patient broke out in very bad rash on both arms.      Data   Most Recent 3 CBC's:Recent Labs   Lab Test 12/20/22  0459 12/17/22  0629 12/14/22  1533 02/21/22  0709   WBC  --  9.8 14.1* 7.2   HGB  --  11.3* 12.8* 11.4*   MCV  --  101* 100 100    233 283 173      Most Recent 3 BMP's:  Recent Labs   Lab Test 12/20/22  0459 12/15/22  1658 12/15/22  0804 12/14/22  1449 02/21/22  0709   NA  --   --  139 133* 139   POTASSIUM  --   --  4.2 4.9 4.1   CHLORIDE  --   --  104 98 106   CO2  --   --  24 24 25   BUN  --   --  32.7* 27.3* 24   CR 0.92  --  1.08 0.94 0.88   ANIONGAP  --   --  11 11 8   HGAZAL  --   --  8.4 8.8 8.1*   GLC  --  96 65* 104* 93     Most Recent 2 LFT's:  Recent Labs   Lab  Test 01/09/22  1756 01/31/19  0535   AST 17 28   ALT <9 18   ALKPHOS 64 54   BILITOTAL 0.8 0.4     Most Recent INR's and Anticoagulation Dosing History:  Anticoagulation Dose History     Recent Dosing and Labs Latest Ref Rng & Units 8/23/2018 1/29/2019    INR 0.90 - 1.10 1.08 1.11(H)        Most Recent 3 Troponin's:No lab results found.  Most Recent Cholesterol Panel:  Recent Labs   Lab Test 04/15/16  1132   CHOL 215*   *   HDL 34*   TRIG 87     Most Recent 6 Bacteria Isolates From Any Culture (See EPIC Reports for Culture Details):No lab results found.  Most Recent TSH, T4 and A1c Labs:  Recent Labs   Lab Test 12/15/22  0929 01/29/19  1612   TSH  --  0.73   A1C 5.2  --

## 2022-12-22 NOTE — PROGRESS NOTES
"Clinic Care Coordination Contact  [facility]: Post-Discharge Note  SITUATION                                                      Admission:    Admission Date: 12/14/22   Reason for Admission: Generalized weakness, Diarrhea  Discharge:   Discharge Date: 12/21/22  Discharge Diagnosis: Syncope w Complete heart block -- S/P PPM 12/16/22,Dementia, old age, without behavioral disturbance (H), Chronic systolic CHF, Generalized muscle weakness, Diarrhea, unspecified type    BACKGROUND                                                      Per hospital discharge summary and inpatient provider notes:    Billie Garcias is a 89 year old male with a medical history of Alzheimer's disease, COPD, and heart failure who presents to the ED for evaluation of diarrhea and generalized weakness. Patient reports that he fainted this morning when he got up from bed. His wife called EMS. He reports that he has been having diarrhea and feels generally weak. He has been having 3-4 episodes of diarrhea per day. The stool is nonbloody. He reports no associated nausea, vomiting and abdomina pain. He is tto weak to walk with his walker. No fever, cough, SOB and chest pain. In ER, lab test revealed mildly elevated troponin. He has bradycardia with HR at the 30s. Patient does not know the baseline of his heart rate.    ASSESSMENT      Discharge Assessment  How are you doing now that you are home?: \"We got up at 4 am and I have him in the living room where he slept. He seems to be more comfortable and wanting to sleep out there so I propped him up with pillows and things. His appetite is good which really suprised me. He hasn't had a bowel movement yet but has urinated. I washed him down and put new clothes on and that's about it. I did get an appointment on the 30th with cardiology scheduled for him.\"  How are your symptoms? (Red Flag symptoms escalate to triage hotline per guidelines): Improved  Do you feel your condition is stable enough " to be safe at home until your provider visit?: Yes  Does the patient have their discharge instructions? : Yes  Does the patient have questions regarding their discharge instructions? : No  Were you started on any new medications or were there changes to any of your previous medications? : Yes  Does the patient have all of their medications?: Yes  Do you have questions regarding any of your medications? : Yes (see comment) (Pt's wife had questions about lisinopril 5 MG tablet medication. CHW reviewed AVS with pt's wife.)  Do you have all of your needed medical supplies or equipment (DME)?  (i.e. oxygen tank, CPAP, cane, etc.): Yes  Discharge follow-up appointment scheduled within 14 calendar days? : Yes  Discharge Follow Up Appointment Date: 12/30/22  Discharge Follow Up Appointment Scheduled with?: Specialty Care Provider (Cardiologist)    Post-op (VERONICA CTA Only)  If the patient had a surgery or procedure, do they have any questions for a nurse?: No      PLAN                                                      Outpatient Plan:      Follow-up and recommended labs and tests  Follow up Cardiology in 1 week to check pacemaker. Call [phone]  if questions.  Call Dr. Bianchi if any medical questions at Cell Phone [phone].    Future Appointments   Date Time Provider Department Center   12/30/2022  2:00 PM ARNALDO [facility] DEVICE NURSE 1 ADAM [facility]   1/30/2023 10:00 AM ARNALDO [facility] DEVICE NURSE 1 ADAM [facility]         For any urgent concerns, please contact our 24 hour nurse triage line: [phone] ([address] [place] [address])         VERONICA Chou  [phone]  [facility]

## 2023-01-01 ENCOUNTER — APPOINTMENT (OUTPATIENT)
Dept: SPEECH THERAPY | Facility: HOSPITAL | Age: 88
DRG: 190 | End: 2023-01-01
Attending: HOSPITALIST
Payer: COMMERCIAL

## 2023-01-01 ENCOUNTER — PATIENT OUTREACH (OUTPATIENT)
Dept: CARE COORDINATION | Facility: CLINIC | Age: 88
End: 2023-01-01
Payer: COMMERCIAL

## 2023-01-01 ENCOUNTER — APPOINTMENT (OUTPATIENT)
Dept: RADIOLOGY | Facility: HOSPITAL | Age: 88
DRG: 190 | End: 2023-01-01
Attending: HOSPITALIST
Payer: COMMERCIAL

## 2023-01-01 ENCOUNTER — APPOINTMENT (OUTPATIENT)
Dept: SPEECH THERAPY | Facility: HOSPITAL | Age: 88
DRG: 190 | End: 2023-01-01
Payer: COMMERCIAL

## 2023-01-01 ENCOUNTER — APPOINTMENT (OUTPATIENT)
Dept: OCCUPATIONAL THERAPY | Facility: HOSPITAL | Age: 88
DRG: 190 | End: 2023-01-01
Attending: HOSPITALIST
Payer: COMMERCIAL

## 2023-01-01 ENCOUNTER — ANCILLARY PROCEDURE (OUTPATIENT)
Dept: CARDIOLOGY | Facility: CLINIC | Age: 88
End: 2023-01-01
Attending: INTERNAL MEDICINE
Payer: COMMERCIAL

## 2023-01-01 ENCOUNTER — APPOINTMENT (OUTPATIENT)
Dept: RADIOLOGY | Facility: HOSPITAL | Age: 88
DRG: 190 | End: 2023-01-01
Attending: STUDENT IN AN ORGANIZED HEALTH CARE EDUCATION/TRAINING PROGRAM
Payer: COMMERCIAL

## 2023-01-01 ENCOUNTER — APPOINTMENT (OUTPATIENT)
Dept: PHYSICAL THERAPY | Facility: HOSPITAL | Age: 88
DRG: 190 | End: 2023-01-01
Payer: COMMERCIAL

## 2023-01-01 ENCOUNTER — APPOINTMENT (OUTPATIENT)
Dept: PHYSICAL THERAPY | Facility: HOSPITAL | Age: 88
DRG: 190 | End: 2023-01-01
Attending: INTERNAL MEDICINE
Payer: COMMERCIAL

## 2023-01-01 ENCOUNTER — HOSPITAL ENCOUNTER (INPATIENT)
Facility: HOSPITAL | Age: 88
LOS: 3 days | Discharge: HOME | DRG: 190 | End: 2023-04-17
Attending: STUDENT IN AN ORGANIZED HEALTH CARE EDUCATION/TRAINING PROGRAM | Admitting: HOSPITALIST
Payer: COMMERCIAL

## 2023-01-01 ENCOUNTER — HOSPITAL ENCOUNTER (INPATIENT)
Facility: HOSPITAL | Age: 88
LOS: 4 days | Discharge: HOSPICE | DRG: 190 | End: 2023-05-13
Attending: EMERGENCY MEDICINE | Admitting: INTERNAL MEDICINE
Payer: COMMERCIAL

## 2023-01-01 ENCOUNTER — APPOINTMENT (OUTPATIENT)
Dept: PHYSICAL THERAPY | Facility: HOSPITAL | Age: 88
DRG: 190 | End: 2023-01-01
Attending: HOSPITALIST
Payer: COMMERCIAL

## 2023-01-01 ENCOUNTER — APPOINTMENT (OUTPATIENT)
Dept: OCCUPATIONAL THERAPY | Facility: HOSPITAL | Age: 88
DRG: 190 | End: 2023-01-01
Payer: COMMERCIAL

## 2023-01-01 ENCOUNTER — APPOINTMENT (OUTPATIENT)
Dept: OCCUPATIONAL THERAPY | Facility: HOSPITAL | Age: 88
DRG: 190 | End: 2023-01-01
Attending: INTERNAL MEDICINE
Payer: COMMERCIAL

## 2023-01-01 ENCOUNTER — APPOINTMENT (OUTPATIENT)
Dept: RADIOLOGY | Facility: HOSPITAL | Age: 88
DRG: 190 | End: 2023-01-01
Attending: EMERGENCY MEDICINE
Payer: COMMERCIAL

## 2023-01-01 ENCOUNTER — APPOINTMENT (OUTPATIENT)
Dept: CARDIOLOGY | Facility: HOSPITAL | Age: 88
DRG: 190 | End: 2023-01-01
Attending: INTERNAL MEDICINE
Payer: COMMERCIAL

## 2023-01-01 VITALS
OXYGEN SATURATION: 94 % | HEART RATE: 65 BPM | DIASTOLIC BLOOD PRESSURE: 84 MMHG | BODY MASS INDEX: 14.51 KG/M2 | RESPIRATION RATE: 18 BRPM | TEMPERATURE: 97.9 F | WEIGHT: 110 LBS | SYSTOLIC BLOOD PRESSURE: 142 MMHG

## 2023-01-01 VITALS
HEIGHT: 72 IN | BODY MASS INDEX: 17.61 KG/M2 | WEIGHT: 130 LBS | DIASTOLIC BLOOD PRESSURE: 81 MMHG | TEMPERATURE: 97.8 F | HEART RATE: 70 BPM | RESPIRATION RATE: 20 BRPM | SYSTOLIC BLOOD PRESSURE: 141 MMHG | OXYGEN SATURATION: 92 %

## 2023-01-01 DIAGNOSIS — I50.32 CHRONIC DIASTOLIC HEART FAILURE (H): ICD-10-CM

## 2023-01-01 DIAGNOSIS — K59.00 CONSTIPATION, UNSPECIFIED CONSTIPATION TYPE: ICD-10-CM

## 2023-01-01 DIAGNOSIS — Z95.0 PACEMAKER: ICD-10-CM

## 2023-01-01 DIAGNOSIS — Z95.0 CARDIAC PACEMAKER IN SITU: Primary | ICD-10-CM

## 2023-01-01 DIAGNOSIS — I44.2 COMPLETE HEART BLOCK (H): ICD-10-CM

## 2023-01-01 DIAGNOSIS — M62.81 GENERALIZED MUSCLE WEAKNESS: ICD-10-CM

## 2023-01-01 DIAGNOSIS — I44.7 LEFT BUNDLE BRANCH BLOCK: ICD-10-CM

## 2023-01-01 DIAGNOSIS — J44.1 COPD EXACERBATION (H): ICD-10-CM

## 2023-01-01 DIAGNOSIS — J44.1 COPD EXACERBATION (H): Primary | ICD-10-CM

## 2023-01-01 LAB
ALBUMIN SERPL BCG-MCNC: 3.5 G/DL (ref 3.5–5.2)
ALBUMIN SERPL BCG-MCNC: 3.7 G/DL (ref 3.5–5.2)
ALP SERPL-CCNC: 68 U/L (ref 40–129)
ALP SERPL-CCNC: 81 U/L (ref 40–129)
ALT SERPL W P-5'-P-CCNC: 10 U/L (ref 10–50)
ALT SERPL W P-5'-P-CCNC: 9 U/L (ref 10–50)
ANION GAP SERPL CALCULATED.3IONS-SCNC: 10 MMOL/L (ref 7–15)
ANION GAP SERPL CALCULATED.3IONS-SCNC: 11 MMOL/L (ref 7–15)
ANION GAP SERPL CALCULATED.3IONS-SCNC: 11 MMOL/L (ref 7–15)
ANION GAP SERPL CALCULATED.3IONS-SCNC: 12 MMOL/L (ref 7–15)
ANION GAP SERPL CALCULATED.3IONS-SCNC: 8 MMOL/L (ref 7–15)
AST SERPL W P-5'-P-CCNC: 22 U/L (ref 10–50)
AST SERPL W P-5'-P-CCNC: 25 U/L (ref 10–50)
ATRIAL RATE - MUSE: 60 BPM
ATRIAL RATE - MUSE: 79 BPM
BASOPHILS # BLD AUTO: 0 10E3/UL (ref 0–0.2)
BASOPHILS # BLD AUTO: 0.1 10E3/UL (ref 0–0.2)
BASOPHILS # BLD AUTO: 0.1 10E3/UL (ref 0–0.2)
BASOPHILS NFR BLD AUTO: 0 %
BASOPHILS NFR BLD AUTO: 1 %
BASOPHILS NFR BLD AUTO: 1 %
BILIRUB SERPL-MCNC: 0.5 MG/DL
BILIRUB SERPL-MCNC: 0.5 MG/DL
BUN SERPL-MCNC: 28.8 MG/DL (ref 8–23)
BUN SERPL-MCNC: 31.6 MG/DL (ref 8–23)
BUN SERPL-MCNC: 33 MG/DL (ref 8–23)
BUN SERPL-MCNC: 46.5 MG/DL (ref 8–23)
BUN SERPL-MCNC: 60.2 MG/DL (ref 8–23)
CALCIUM SERPL-MCNC: 8.2 MG/DL (ref 8.2–9.6)
CALCIUM SERPL-MCNC: 8.8 MG/DL (ref 8.2–9.6)
CALCIUM SERPL-MCNC: 9 MG/DL (ref 8.2–9.6)
CALCIUM SERPL-MCNC: 9.1 MG/DL (ref 8.2–9.6)
CALCIUM SERPL-MCNC: 9.3 MG/DL (ref 8.2–9.6)
CHLORIDE SERPL-SCNC: 103 MMOL/L (ref 98–107)
CHLORIDE SERPL-SCNC: 104 MMOL/L (ref 98–107)
CHLORIDE SERPL-SCNC: 105 MMOL/L (ref 98–107)
CHLORIDE SERPL-SCNC: 105 MMOL/L (ref 98–107)
CHLORIDE SERPL-SCNC: 106 MMOL/L (ref 98–107)
CREAT SERPL-MCNC: 0.97 MG/DL (ref 0.67–1.17)
CREAT SERPL-MCNC: 1 MG/DL (ref 0.67–1.17)
CREAT SERPL-MCNC: 1 MG/DL (ref 0.67–1.17)
CREAT SERPL-MCNC: 1.09 MG/DL (ref 0.67–1.17)
CREAT SERPL-MCNC: 1.29 MG/DL (ref 0.67–1.17)
DEPRECATED HCO3 PLAS-SCNC: 24 MMOL/L (ref 22–29)
DEPRECATED HCO3 PLAS-SCNC: 25 MMOL/L (ref 22–29)
DEPRECATED HCO3 PLAS-SCNC: 25 MMOL/L (ref 22–29)
DEPRECATED HCO3 PLAS-SCNC: 26 MMOL/L (ref 22–29)
DEPRECATED HCO3 PLAS-SCNC: 28 MMOL/L (ref 22–29)
DIASTOLIC BLOOD PRESSURE - MUSE: 77 MMHG
DIASTOLIC BLOOD PRESSURE - MUSE: NORMAL MMHG
DIGOXIN SERPL-MCNC: <0.4 NG/ML (ref 0.6–2)
EOSINOPHIL # BLD AUTO: 0 10E3/UL (ref 0–0.7)
EOSINOPHIL # BLD AUTO: 0.7 10E3/UL (ref 0–0.7)
EOSINOPHIL # BLD AUTO: 0.8 10E3/UL (ref 0–0.7)
EOSINOPHIL NFR BLD AUTO: 0 %
EOSINOPHIL NFR BLD AUTO: 13 %
EOSINOPHIL NFR BLD AUTO: 8 %
ERYTHROCYTE [DISTWIDTH] IN BLOOD BY AUTOMATED COUNT: 12.9 % (ref 10–15)
ERYTHROCYTE [DISTWIDTH] IN BLOOD BY AUTOMATED COUNT: 13 % (ref 10–15)
ERYTHROCYTE [DISTWIDTH] IN BLOOD BY AUTOMATED COUNT: 13 % (ref 10–15)
FLUAV RNA SPEC QL NAA+PROBE: NEGATIVE
FLUBV RNA RESP QL NAA+PROBE: NEGATIVE
GFR SERPL CREATININE-BSD FRML MDRD: 53 ML/MIN/1.73M2
GFR SERPL CREATININE-BSD FRML MDRD: 64 ML/MIN/1.73M2
GFR SERPL CREATININE-BSD FRML MDRD: 71 ML/MIN/1.73M2
GFR SERPL CREATININE-BSD FRML MDRD: 71 ML/MIN/1.73M2
GFR SERPL CREATININE-BSD FRML MDRD: 74 ML/MIN/1.73M2
GLUCOSE SERPL-MCNC: 108 MG/DL (ref 70–99)
GLUCOSE SERPL-MCNC: 113 MG/DL (ref 70–99)
GLUCOSE SERPL-MCNC: 138 MG/DL (ref 70–99)
GLUCOSE SERPL-MCNC: 80 MG/DL (ref 70–99)
GLUCOSE SERPL-MCNC: 87 MG/DL (ref 70–99)
HCT VFR BLD AUTO: 32.4 % (ref 40–53)
HCT VFR BLD AUTO: 33.9 % (ref 40–53)
HCT VFR BLD AUTO: 41.1 % (ref 40–53)
HGB BLD-MCNC: 10.6 G/DL (ref 13.3–17.7)
HGB BLD-MCNC: 11 G/DL (ref 13.3–17.7)
HGB BLD-MCNC: 11 G/DL (ref 13.3–17.7)
HGB BLD-MCNC: 13.1 G/DL (ref 13.3–17.7)
HOLD SPECIMEN: NORMAL
IMM GRANULOCYTES # BLD: 0 10E3/UL
IMM GRANULOCYTES # BLD: 0 10E3/UL
IMM GRANULOCYTES # BLD: 0.1 10E3/UL
IMM GRANULOCYTES NFR BLD: 0 %
IMM GRANULOCYTES NFR BLD: 1 %
IMM GRANULOCYTES NFR BLD: 1 %
INTERPRETATION ECG - MUSE: NORMAL
INTERPRETATION ECG - MUSE: NORMAL
LYMPHOCYTES # BLD AUTO: 0.6 10E3/UL (ref 0.8–5.3)
LYMPHOCYTES # BLD AUTO: 1.3 10E3/UL (ref 0.8–5.3)
LYMPHOCYTES # BLD AUTO: 1.5 10E3/UL (ref 0.8–5.3)
LYMPHOCYTES NFR BLD AUTO: 16 %
LYMPHOCYTES NFR BLD AUTO: 23 %
LYMPHOCYTES NFR BLD AUTO: 9 %
MAGNESIUM SERPL-MCNC: 2.2 MG/DL (ref 1.7–2.3)
MCH RBC QN AUTO: 32.4 PG (ref 26.5–33)
MCH RBC QN AUTO: 32.6 PG (ref 26.5–33)
MCH RBC QN AUTO: 32.6 PG (ref 26.5–33)
MCHC RBC AUTO-ENTMCNC: 31.9 G/DL (ref 31.5–36.5)
MCHC RBC AUTO-ENTMCNC: 32.4 G/DL (ref 31.5–36.5)
MCHC RBC AUTO-ENTMCNC: 32.7 G/DL (ref 31.5–36.5)
MCV RBC AUTO: 100 FL (ref 78–100)
MCV RBC AUTO: 101 FL (ref 78–100)
MCV RBC AUTO: 102 FL (ref 78–100)
MDC_IDC_EPISODE_DTM: NORMAL
MDC_IDC_EPISODE_DURATION: 1 S
MDC_IDC_EPISODE_ID: NORMAL
MDC_IDC_EPISODE_TYPE: NORMAL
MDC_IDC_LEAD_IMPLANT_DT: NORMAL
MDC_IDC_LEAD_LOCATION: NORMAL
MDC_IDC_LEAD_LOCATION_DETAIL_1: NORMAL
MDC_IDC_LEAD_MFG: NORMAL
MDC_IDC_LEAD_MODEL: NORMAL
MDC_IDC_LEAD_POLARITY_TYPE: NORMAL
MDC_IDC_LEAD_SERIAL: NORMAL
MDC_IDC_MSMT_BATTERY_DTM: NORMAL
MDC_IDC_MSMT_BATTERY_REMAINING_LONGEVITY: 162 MO
MDC_IDC_MSMT_BATTERY_REMAINING_PERCENTAGE: 100 %
MDC_IDC_MSMT_BATTERY_STATUS: NORMAL
MDC_IDC_MSMT_BATTERY_STATUS: NORMAL
MDC_IDC_MSMT_LEADCHNL_RA_IMPEDANCE_VALUE: 605 OHM
MDC_IDC_MSMT_LEADCHNL_RA_IMPEDANCE_VALUE: 635 OHM
MDC_IDC_MSMT_LEADCHNL_RA_IMPEDANCE_VALUE: 635 OHM
MDC_IDC_MSMT_LEADCHNL_RA_PACING_THRESHOLD_AMPLITUDE: 0.6 V
MDC_IDC_MSMT_LEADCHNL_RA_PACING_THRESHOLD_AMPLITUDE: 0.7 V
MDC_IDC_MSMT_LEADCHNL_RA_PACING_THRESHOLD_AMPLITUDE: 2.3 V
MDC_IDC_MSMT_LEADCHNL_RA_PACING_THRESHOLD_PULSEWIDTH: 0.4 MS
MDC_IDC_MSMT_LEADCHNL_RA_SENSING_INTR_AMPL: 5.1 MV
MDC_IDC_MSMT_LEADCHNL_RA_SENSING_INTR_AMPL: 5.1 MV
MDC_IDC_MSMT_LEADCHNL_RV_IMPEDANCE_VALUE: 619 OHM
MDC_IDC_MSMT_LEADCHNL_RV_IMPEDANCE_VALUE: 659 OHM
MDC_IDC_MSMT_LEADCHNL_RV_IMPEDANCE_VALUE: 659 OHM
MDC_IDC_MSMT_LEADCHNL_RV_PACING_THRESHOLD_AMPLITUDE: 0.7 V
MDC_IDC_MSMT_LEADCHNL_RV_PACING_THRESHOLD_AMPLITUDE: 0.7 V
MDC_IDC_MSMT_LEADCHNL_RV_PACING_THRESHOLD_AMPLITUDE: 0.9 V
MDC_IDC_MSMT_LEADCHNL_RV_PACING_THRESHOLD_PULSEWIDTH: 0.4 MS
MDC_IDC_MSMT_LEADCHNL_RV_SENSING_INTR_AMPL: 22.2 MV
MDC_IDC_MSMT_LEADCHNL_RV_SENSING_INTR_AMPL: 22.2 MV
MDC_IDC_PG_IMPLANT_DTM: NORMAL
MDC_IDC_PG_IMPLANT_DTM: NORMAL
MDC_IDC_PG_MFG: NORMAL
MDC_IDC_PG_MFG: NORMAL
MDC_IDC_PG_MODEL: NORMAL
MDC_IDC_PG_MODEL: NORMAL
MDC_IDC_PG_SERIAL: NORMAL
MDC_IDC_PG_SERIAL: NORMAL
MDC_IDC_PG_TYPE: NORMAL
MDC_IDC_PG_TYPE: NORMAL
MDC_IDC_SESS_CLINIC_NAME: NORMAL
MDC_IDC_SESS_CLINIC_NAME: NORMAL
MDC_IDC_SESS_DTM: NORMAL
MDC_IDC_SESS_DTM: NORMAL
MDC_IDC_SESS_TYPE: NORMAL
MDC_IDC_SESS_TYPE: NORMAL
MDC_IDC_SET_BRADY_AT_MODE_SWITCH_MODE: NORMAL
MDC_IDC_SET_BRADY_AT_MODE_SWITCH_MODE: NORMAL
MDC_IDC_SET_BRADY_AT_MODE_SWITCH_RATE: 160 {BEATS}/MIN
MDC_IDC_SET_BRADY_AT_MODE_SWITCH_RATE: 160 {BEATS}/MIN
MDC_IDC_SET_BRADY_LOWRATE: 50 {BEATS}/MIN
MDC_IDC_SET_BRADY_LOWRATE: 50 {BEATS}/MIN
MDC_IDC_SET_BRADY_MAX_SENSOR_RATE: 120 {BEATS}/MIN
MDC_IDC_SET_BRADY_MAX_SENSOR_RATE: 120 {BEATS}/MIN
MDC_IDC_SET_BRADY_MAX_TRACKING_RATE: 120 {BEATS}/MIN
MDC_IDC_SET_BRADY_MAX_TRACKING_RATE: 120 {BEATS}/MIN
MDC_IDC_SET_BRADY_MODE: NORMAL
MDC_IDC_SET_BRADY_MODE: NORMAL
MDC_IDC_SET_BRADY_PAV_DELAY_HIGH: 180 MS
MDC_IDC_SET_BRADY_PAV_DELAY_HIGH: 180 MS
MDC_IDC_SET_BRADY_PAV_DELAY_LOW: 200 MS
MDC_IDC_SET_BRADY_PAV_DELAY_LOW: 200 MS
MDC_IDC_SET_BRADY_SAV_DELAY_HIGH: 135 MS
MDC_IDC_SET_BRADY_SAV_DELAY_HIGH: 135 MS
MDC_IDC_SET_BRADY_SAV_DELAY_LOW: 150 MS
MDC_IDC_SET_BRADY_SAV_DELAY_LOW: 150 MS
MDC_IDC_SET_LEADCHNL_RA_PACING_AMPLITUDE: 3.5 V
MDC_IDC_SET_LEADCHNL_RA_PACING_AMPLITUDE: 3.5 V
MDC_IDC_SET_LEADCHNL_RA_PACING_CAPTURE_MODE: NORMAL
MDC_IDC_SET_LEADCHNL_RA_PACING_CAPTURE_MODE: NORMAL
MDC_IDC_SET_LEADCHNL_RA_PACING_POLARITY: NORMAL
MDC_IDC_SET_LEADCHNL_RA_PACING_POLARITY: NORMAL
MDC_IDC_SET_LEADCHNL_RA_PACING_PULSEWIDTH: 0.4 MS
MDC_IDC_SET_LEADCHNL_RA_PACING_PULSEWIDTH: 0.4 MS
MDC_IDC_SET_LEADCHNL_RA_SENSING_ADAPTATION_MODE: NORMAL
MDC_IDC_SET_LEADCHNL_RA_SENSING_ADAPTATION_MODE: NORMAL
MDC_IDC_SET_LEADCHNL_RA_SENSING_POLARITY: NORMAL
MDC_IDC_SET_LEADCHNL_RA_SENSING_POLARITY: NORMAL
MDC_IDC_SET_LEADCHNL_RA_SENSING_SENSITIVITY: 0.25 MV
MDC_IDC_SET_LEADCHNL_RA_SENSING_SENSITIVITY: 0.25 MV
MDC_IDC_SET_LEADCHNL_RV_PACING_AMPLITUDE: 1.4 V
MDC_IDC_SET_LEADCHNL_RV_PACING_AMPLITUDE: 1.4 V
MDC_IDC_SET_LEADCHNL_RV_PACING_CAPTURE_MODE: NORMAL
MDC_IDC_SET_LEADCHNL_RV_PACING_CAPTURE_MODE: NORMAL
MDC_IDC_SET_LEADCHNL_RV_PACING_POLARITY: NORMAL
MDC_IDC_SET_LEADCHNL_RV_PACING_POLARITY: NORMAL
MDC_IDC_SET_LEADCHNL_RV_PACING_PULSEWIDTH: 0.4 MS
MDC_IDC_SET_LEADCHNL_RV_PACING_PULSEWIDTH: 0.4 MS
MDC_IDC_SET_LEADCHNL_RV_SENSING_ADAPTATION_MODE: NORMAL
MDC_IDC_SET_LEADCHNL_RV_SENSING_ADAPTATION_MODE: NORMAL
MDC_IDC_SET_LEADCHNL_RV_SENSING_POLARITY: NORMAL
MDC_IDC_SET_LEADCHNL_RV_SENSING_POLARITY: NORMAL
MDC_IDC_SET_LEADCHNL_RV_SENSING_SENSITIVITY: 1.5 MV
MDC_IDC_SET_LEADCHNL_RV_SENSING_SENSITIVITY: 1.5 MV
MDC_IDC_SET_ZONE_DETECTION_INTERVAL: 353 MS
MDC_IDC_SET_ZONE_DETECTION_INTERVAL: 353 MS
MDC_IDC_SET_ZONE_TYPE: NORMAL
MDC_IDC_SET_ZONE_TYPE: NORMAL
MDC_IDC_SET_ZONE_VENDOR_TYPE: NORMAL
MDC_IDC_SET_ZONE_VENDOR_TYPE: NORMAL
MDC_IDC_STAT_AT_BURDEN_PERCENT: 1 %
MDC_IDC_STAT_AT_DTM_END: NORMAL
MDC_IDC_STAT_AT_DTM_START: NORMAL
MDC_IDC_STAT_BRADY_DTM_END: NORMAL
MDC_IDC_STAT_BRADY_DTM_START: NORMAL
MDC_IDC_STAT_BRADY_RA_PERCENT_PACED: 3 %
MDC_IDC_STAT_BRADY_RA_PERCENT_PACED: 6 %
MDC_IDC_STAT_BRADY_RV_PERCENT_PACED: 100 %
MDC_IDC_STAT_BRADY_RV_PERCENT_PACED: 100 %
MDC_IDC_STAT_EPISODE_RECENT_COUNT: 0
MDC_IDC_STAT_EPISODE_RECENT_COUNT: 1
MDC_IDC_STAT_EPISODE_RECENT_COUNT_DTM_END: NORMAL
MDC_IDC_STAT_EPISODE_RECENT_COUNT_DTM_START: NORMAL
MDC_IDC_STAT_EPISODE_TOTAL_COUNT: 0
MDC_IDC_STAT_EPISODE_TOTAL_COUNT_DTM_END: NORMAL
MDC_IDC_STAT_EPISODE_TYPE: NORMAL
MDC_IDC_STAT_EPISODE_VENDOR_TYPE: NORMAL
MONOCYTES # BLD AUTO: 0.2 10E3/UL (ref 0–1.3)
MONOCYTES # BLD AUTO: 0.6 10E3/UL (ref 0–1.3)
MONOCYTES # BLD AUTO: 0.7 10E3/UL (ref 0–1.3)
MONOCYTES NFR BLD AUTO: 10 %
MONOCYTES NFR BLD AUTO: 4 %
MONOCYTES NFR BLD AUTO: 9 %
NEUTROPHILS # BLD AUTO: 3.4 10E3/UL (ref 1.6–8.3)
NEUTROPHILS # BLD AUTO: 5.4 10E3/UL (ref 1.6–8.3)
NEUTROPHILS # BLD AUTO: 5.8 10E3/UL (ref 1.6–8.3)
NEUTROPHILS NFR BLD AUTO: 52 %
NEUTROPHILS NFR BLD AUTO: 66 %
NEUTROPHILS NFR BLD AUTO: 86 %
NRBC # BLD AUTO: 0 10E3/UL
NRBC BLD AUTO-RTO: 0 /100
NT-PROBNP SERPL-MCNC: 1334 PG/ML (ref 0–1800)
NT-PROBNP SERPL-MCNC: 1527 PG/ML (ref 0–1800)
P AXIS - MUSE: 81 DEGREES
P AXIS - MUSE: 88 DEGREES
PLATELET # BLD AUTO: 154 10E3/UL (ref 150–450)
PLATELET # BLD AUTO: 167 10E3/UL (ref 150–450)
PLATELET # BLD AUTO: 173 10E3/UL (ref 150–450)
PLATELET # BLD AUTO: 195 10E3/UL (ref 150–450)
POTASSIUM SERPL-SCNC: 3.9 MMOL/L (ref 3.4–5.3)
POTASSIUM SERPL-SCNC: 4.5 MMOL/L (ref 3.4–5.3)
POTASSIUM SERPL-SCNC: 4.7 MMOL/L (ref 3.4–5.3)
PR INTERVAL - MUSE: 160 MS
PR INTERVAL - MUSE: 164 MS
PROT SERPL-MCNC: 6 G/DL (ref 6.4–8.3)
PROT SERPL-MCNC: 6.2 G/DL (ref 6.4–8.3)
QRS DURATION - MUSE: 174 MS
QRS DURATION - MUSE: 186 MS
QT - MUSE: 448 MS
QT - MUSE: 498 MS
QTC - MUSE: 498 MS
QTC - MUSE: 513 MS
R AXIS - MUSE: 19 DEGREES
R AXIS - MUSE: 89 DEGREES
RBC # BLD AUTO: 3.25 10E6/UL (ref 4.4–5.9)
RBC # BLD AUTO: 3.37 10E6/UL (ref 4.4–5.9)
RBC # BLD AUTO: 4.04 10E6/UL (ref 4.4–5.9)
RSV RNA SPEC NAA+PROBE: NEGATIVE
SARS-COV-2 RNA RESP QL NAA+PROBE: NEGATIVE
SODIUM SERPL-SCNC: 138 MMOL/L (ref 136–145)
SODIUM SERPL-SCNC: 140 MMOL/L (ref 136–145)
SODIUM SERPL-SCNC: 141 MMOL/L (ref 136–145)
SODIUM SERPL-SCNC: 142 MMOL/L (ref 136–145)
SODIUM SERPL-SCNC: 142 MMOL/L (ref 136–145)
SYSTOLIC BLOOD PRESSURE - MUSE: 148 MMHG
SYSTOLIC BLOOD PRESSURE - MUSE: NORMAL MMHG
T AXIS - MUSE: -48 DEGREES
T AXIS - MUSE: 95 DEGREES
TROPONIN T SERPL HS-MCNC: 38 NG/L
TROPONIN T SERPL HS-MCNC: 42 NG/L
TROPONIN T SERPL HS-MCNC: 43 NG/L
TROPONIN T SERPL HS-MCNC: 47 NG/L
TROPONIN T SERPL HS-MCNC: 53 NG/L
TROPONIN T SERPL HS-MCNC: 54 NG/L
VENTRICULAR RATE- MUSE: 60 BPM
VENTRICULAR RATE- MUSE: 79 BPM
WBC # BLD AUTO: 6.4 10E3/UL (ref 4–11)
WBC # BLD AUTO: 6.7 10E3/UL (ref 4–11)
WBC # BLD AUTO: 8.2 10E3/UL (ref 4–11)

## 2023-01-01 PROCEDURE — 94640 AIRWAY INHALATION TREATMENT: CPT | Mod: 76

## 2023-01-01 PROCEDURE — 250N000011 HC RX IP 250 OP 636: Performed by: HOSPITALIST

## 2023-01-01 PROCEDURE — 80053 COMPREHEN METABOLIC PANEL: CPT | Performed by: STUDENT IN AN ORGANIZED HEALTH CARE EDUCATION/TRAINING PROGRAM

## 2023-01-01 PROCEDURE — 71046 X-RAY EXAM CHEST 2 VIEWS: CPT

## 2023-01-01 PROCEDURE — 250N000012 HC RX MED GY IP 250 OP 636 PS 637: Performed by: HOSPITALIST

## 2023-01-01 PROCEDURE — 250N000011 HC RX IP 250 OP 636: Performed by: INTERNAL MEDICINE

## 2023-01-01 PROCEDURE — 250N000013 HC RX MED GY IP 250 OP 250 PS 637: Performed by: INTERNAL MEDICINE

## 2023-01-01 PROCEDURE — 120N000001 HC R&B MED SURG/OB

## 2023-01-01 PROCEDURE — 84484 ASSAY OF TROPONIN QUANT: CPT | Performed by: STUDENT IN AN ORGANIZED HEALTH CARE EDUCATION/TRAINING PROGRAM

## 2023-01-01 PROCEDURE — G0463 HOSPITAL OUTPT CLINIC VISIT: HCPCS

## 2023-01-01 PROCEDURE — 99232 SBSQ HOSP IP/OBS MODERATE 35: CPT | Performed by: INTERNAL MEDICINE

## 2023-01-01 PROCEDURE — G0463 HOSPITAL OUTPT CLINIC VISIT: HCPCS | Mod: 25

## 2023-01-01 PROCEDURE — 250N000013 HC RX MED GY IP 250 OP 250 PS 637: Performed by: HOSPITALIST

## 2023-01-01 PROCEDURE — 999N000157 HC STATISTIC RCP TIME EA 10 MIN

## 2023-01-01 PROCEDURE — 99285 EMERGENCY DEPT VISIT HI MDM: CPT | Mod: 25,CS

## 2023-01-01 PROCEDURE — 97110 THERAPEUTIC EXERCISES: CPT | Mod: GO

## 2023-01-01 PROCEDURE — 85025 COMPLETE CBC W/AUTO DIFF WBC: CPT | Performed by: INTERNAL MEDICINE

## 2023-01-01 PROCEDURE — 97161 PT EVAL LOW COMPLEX 20 MIN: CPT | Mod: GP

## 2023-01-01 PROCEDURE — 97116 GAIT TRAINING THERAPY: CPT | Mod: GP

## 2023-01-01 PROCEDURE — 36415 COLL VENOUS BLD VENIPUNCTURE: CPT | Performed by: INTERNAL MEDICINE

## 2023-01-01 PROCEDURE — 85018 HEMOGLOBIN: CPT | Performed by: HOSPITALIST

## 2023-01-01 PROCEDURE — 94640 AIRWAY INHALATION TREATMENT: CPT

## 2023-01-01 PROCEDURE — 250N000009 HC RX 250: Performed by: INTERNAL MEDICINE

## 2023-01-01 PROCEDURE — 97530 THERAPEUTIC ACTIVITIES: CPT | Mod: GO

## 2023-01-01 PROCEDURE — 999N000156 HC STATISTIC RCP CONSULT EA 30 MIN

## 2023-01-01 PROCEDURE — 258N000003 HC RX IP 258 OP 636: Performed by: INTERNAL MEDICINE

## 2023-01-01 PROCEDURE — 999N000032 HC STATISTIC CHRONIC DISEASE SPECIALIST RT CONSULT

## 2023-01-01 PROCEDURE — 36415 COLL VENOUS BLD VENIPUNCTURE: CPT | Performed by: EMERGENCY MEDICINE

## 2023-01-01 PROCEDURE — 92526 ORAL FUNCTION THERAPY: CPT | Mod: GN

## 2023-01-01 PROCEDURE — 250N000009 HC RX 250: Performed by: HOSPITALIST

## 2023-01-01 PROCEDURE — 97535 SELF CARE MNGMENT TRAINING: CPT | Mod: GO

## 2023-01-01 PROCEDURE — 272N000202 HC AEROBIKA WITH MANOMETER

## 2023-01-01 PROCEDURE — 99223 1ST HOSP IP/OBS HIGH 75: CPT | Mod: AI | Performed by: INTERNAL MEDICINE

## 2023-01-01 PROCEDURE — 83880 ASSAY OF NATRIURETIC PEPTIDE: CPT | Performed by: STUDENT IN AN ORGANIZED HEALTH CARE EDUCATION/TRAINING PROGRAM

## 2023-01-01 PROCEDURE — 250N000012 HC RX MED GY IP 250 OP 636 PS 637: Performed by: INTERNAL MEDICINE

## 2023-01-01 PROCEDURE — 94664 DEMO&/EVAL PT USE INHALER: CPT

## 2023-01-01 PROCEDURE — 84484 ASSAY OF TROPONIN QUANT: CPT | Performed by: INTERNAL MEDICINE

## 2023-01-01 PROCEDURE — 36415 COLL VENOUS BLD VENIPUNCTURE: CPT | Performed by: STUDENT IN AN ORGANIZED HEALTH CARE EDUCATION/TRAINING PROGRAM

## 2023-01-01 PROCEDURE — 97165 OT EVAL LOW COMPLEX 30 MIN: CPT | Mod: GO

## 2023-01-01 PROCEDURE — 85025 COMPLETE CBC W/AUTO DIFF WBC: CPT | Performed by: STUDENT IN AN ORGANIZED HEALTH CARE EDUCATION/TRAINING PROGRAM

## 2023-01-01 PROCEDURE — 80053 COMPREHEN METABOLIC PANEL: CPT | Performed by: INTERNAL MEDICINE

## 2023-01-01 PROCEDURE — 96374 THER/PROPH/DIAG INJ IV PUSH: CPT

## 2023-01-01 PROCEDURE — 93005 ELECTROCARDIOGRAM TRACING: CPT | Performed by: EMERGENCY MEDICINE

## 2023-01-01 PROCEDURE — 93005 ELECTROCARDIOGRAM TRACING: CPT | Performed by: STUDENT IN AN ORGANIZED HEALTH CARE EDUCATION/TRAINING PROGRAM

## 2023-01-01 PROCEDURE — 255N000002 HC RX 255 OP 636: Performed by: HOSPITALIST

## 2023-01-01 PROCEDURE — 87637 SARSCOV2&INF A&B&RSV AMP PRB: CPT | Performed by: EMERGENCY MEDICINE

## 2023-01-01 PROCEDURE — 99285 EMERGENCY DEPT VISIT HI MDM: CPT | Mod: 25

## 2023-01-01 PROCEDURE — 93296 REM INTERROG EVL PM/IDS: CPT | Performed by: INTERNAL MEDICINE

## 2023-01-01 PROCEDURE — 99239 HOSP IP/OBS DSCHRG MGMT >30: CPT | Performed by: HOSPITALIST

## 2023-01-01 PROCEDURE — 250N000011 HC RX IP 250 OP 636: Performed by: EMERGENCY MEDICINE

## 2023-01-01 PROCEDURE — 74230 X-RAY XM SWLNG FUNCJ C+: CPT

## 2023-01-01 PROCEDURE — 36415 COLL VENOUS BLD VENIPUNCTURE: CPT | Performed by: HOSPITALIST

## 2023-01-01 PROCEDURE — 250N000009 HC RX 250: Performed by: EMERGENCY MEDICINE

## 2023-01-01 PROCEDURE — 80048 BASIC METABOLIC PNL TOTAL CA: CPT | Performed by: EMERGENCY MEDICINE

## 2023-01-01 PROCEDURE — 93294 REM INTERROG EVL PM/LDLS PM: CPT | Performed by: INTERNAL MEDICINE

## 2023-01-01 PROCEDURE — 94799 UNLISTED PULMONARY SVC/PX: CPT

## 2023-01-01 PROCEDURE — 80048 BASIC METABOLIC PNL TOTAL CA: CPT | Performed by: HOSPITALIST

## 2023-01-01 PROCEDURE — 99222 1ST HOSP IP/OBS MODERATE 55: CPT | Performed by: HOSPITALIST

## 2023-01-01 PROCEDURE — 999N000033 HC STATISTIC CHRONIC PULMONARY DISEASE SPECIALIST

## 2023-01-01 PROCEDURE — 93280 PM DEVICE PROGR EVAL DUAL: CPT | Performed by: INTERNAL MEDICINE

## 2023-01-01 PROCEDURE — 92610 EVALUATE SWALLOWING FUNCTION: CPT | Mod: GN

## 2023-01-01 PROCEDURE — 97110 THERAPEUTIC EXERCISES: CPT | Mod: GP

## 2023-01-01 PROCEDURE — 97530 THERAPEUTIC ACTIVITIES: CPT | Mod: GP

## 2023-01-01 PROCEDURE — 83880 ASSAY OF NATRIURETIC PEPTIDE: CPT | Performed by: EMERGENCY MEDICINE

## 2023-01-01 PROCEDURE — 84484 ASSAY OF TROPONIN QUANT: CPT | Performed by: EMERGENCY MEDICINE

## 2023-01-01 PROCEDURE — 83735 ASSAY OF MAGNESIUM: CPT | Performed by: STUDENT IN AN ORGANIZED HEALTH CARE EDUCATION/TRAINING PROGRAM

## 2023-01-01 PROCEDURE — 250N000013 HC RX MED GY IP 250 OP 250 PS 637: Performed by: STUDENT IN AN ORGANIZED HEALTH CARE EDUCATION/TRAINING PROGRAM

## 2023-01-01 PROCEDURE — 92611 MOTION FLUOROSCOPY/SWALLOW: CPT | Mod: GN

## 2023-01-01 PROCEDURE — C9803 HOPD COVID-19 SPEC COLLECT: HCPCS

## 2023-01-01 PROCEDURE — 99233 SBSQ HOSP IP/OBS HIGH 50: CPT | Performed by: HOSPITALIST

## 2023-01-01 PROCEDURE — 93306 TTE W/DOPPLER COMPLETE: CPT | Mod: 26 | Performed by: INTERNAL MEDICINE

## 2023-01-01 PROCEDURE — 99232 SBSQ HOSP IP/OBS MODERATE 35: CPT | Performed by: HOSPITALIST

## 2023-01-01 PROCEDURE — 99239 HOSP IP/OBS DSCHRG MGMT >30: CPT | Performed by: INTERNAL MEDICINE

## 2023-01-01 PROCEDURE — 85025 COMPLETE CBC W/AUTO DIFF WBC: CPT | Performed by: EMERGENCY MEDICINE

## 2023-01-01 PROCEDURE — 85049 AUTOMATED PLATELET COUNT: CPT | Performed by: INTERNAL MEDICINE

## 2023-01-01 PROCEDURE — 80162 ASSAY OF DIGOXIN TOTAL: CPT | Performed by: STUDENT IN AN ORGANIZED HEALTH CARE EDUCATION/TRAINING PROGRAM

## 2023-01-01 RX ORDER — PREDNISONE 20 MG/1
20 TABLET ORAL DAILY
Qty: 3 TABLET | Refills: 0 | Status: SHIPPED | OUTPATIENT
Start: 2023-01-01 | End: 2023-01-01

## 2023-01-01 RX ORDER — PREDNISONE 20 MG/1
40 TABLET ORAL DAILY
Status: DISCONTINUED | OUTPATIENT
Start: 2023-01-01 | End: 2023-01-01 | Stop reason: HOSPADM

## 2023-01-01 RX ORDER — TAMSULOSIN HYDROCHLORIDE 0.4 MG/1
0.4 CAPSULE ORAL DAILY
Status: DISCONTINUED | OUTPATIENT
Start: 2023-01-01 | End: 2023-01-01 | Stop reason: HOSPADM

## 2023-01-01 RX ORDER — IPRATROPIUM BROMIDE AND ALBUTEROL SULFATE 2.5; .5 MG/3ML; MG/3ML
1 SOLUTION RESPIRATORY (INHALATION) EVERY 6 HOURS PRN
Status: DISCONTINUED | OUTPATIENT
Start: 2023-01-01 | End: 2023-01-01

## 2023-01-01 RX ORDER — IPRATROPIUM BROMIDE AND ALBUTEROL SULFATE 2.5; .5 MG/3ML; MG/3ML
3 SOLUTION RESPIRATORY (INHALATION)
Status: DISCONTINUED | OUTPATIENT
Start: 2023-01-01 | End: 2023-01-01

## 2023-01-01 RX ORDER — GUAIFENESIN 600 MG/1
600 TABLET, EXTENDED RELEASE ORAL 2 TIMES DAILY
Status: DISCONTINUED | OUTPATIENT
Start: 2023-01-01 | End: 2023-01-01 | Stop reason: HOSPADM

## 2023-01-01 RX ORDER — ONDANSETRON 2 MG/ML
4 INJECTION INTRAMUSCULAR; INTRAVENOUS EVERY 6 HOURS PRN
Status: DISCONTINUED | OUTPATIENT
Start: 2023-01-01 | End: 2023-01-01 | Stop reason: HOSPADM

## 2023-01-01 RX ORDER — METHYLPREDNISOLONE SODIUM SUCCINATE 125 MG/2ML
125 INJECTION, POWDER, LYOPHILIZED, FOR SOLUTION INTRAMUSCULAR; INTRAVENOUS ONCE
Status: COMPLETED | OUTPATIENT
Start: 2023-01-01 | End: 2023-01-01

## 2023-01-01 RX ORDER — METHYLPREDNISOLONE SODIUM SUCCINATE 40 MG/ML
40 INJECTION, POWDER, LYOPHILIZED, FOR SOLUTION INTRAMUSCULAR; INTRAVENOUS EVERY 12 HOURS
Status: DISCONTINUED | OUTPATIENT
Start: 2023-01-01 | End: 2023-01-01

## 2023-01-01 RX ORDER — PREDNISONE 20 MG/1
40 TABLET ORAL DAILY
Qty: 4 TABLET | Refills: 0 | Status: SHIPPED | OUTPATIENT
Start: 2023-01-01 | End: 2023-01-01

## 2023-01-01 RX ORDER — ALBUTEROL SULFATE 0.83 MG/ML
2.5 SOLUTION RESPIRATORY (INHALATION)
Status: DISCONTINUED | OUTPATIENT
Start: 2023-01-01 | End: 2023-01-01 | Stop reason: HOSPADM

## 2023-01-01 RX ORDER — ONDANSETRON 4 MG/1
4 TABLET, ORALLY DISINTEGRATING ORAL EVERY 6 HOURS PRN
Status: DISCONTINUED | OUTPATIENT
Start: 2023-01-01 | End: 2023-01-01 | Stop reason: HOSPADM

## 2023-01-01 RX ORDER — METHYLPREDNISOLONE SODIUM SUCCINATE 40 MG/ML
40 INJECTION, POWDER, LYOPHILIZED, FOR SOLUTION INTRAMUSCULAR; INTRAVENOUS EVERY 24 HOURS
Status: DISCONTINUED | OUTPATIENT
Start: 2023-01-01 | End: 2023-01-01

## 2023-01-01 RX ORDER — DOCUSATE SODIUM 100 MG/1
100 CAPSULE, LIQUID FILLED ORAL 2 TIMES DAILY
Status: DISCONTINUED | OUTPATIENT
Start: 2023-01-01 | End: 2023-01-01 | Stop reason: HOSPADM

## 2023-01-01 RX ORDER — AZITHROMYCIN 250 MG/1
250 TABLET, FILM COATED ORAL DAILY
Status: COMPLETED | OUTPATIENT
Start: 2023-01-01 | End: 2023-01-01

## 2023-01-01 RX ORDER — METOPROLOL SUCCINATE 25 MG/1
25 TABLET, EXTENDED RELEASE ORAL DAILY
Status: DISCONTINUED | OUTPATIENT
Start: 2023-01-01 | End: 2023-01-01 | Stop reason: HOSPADM

## 2023-01-01 RX ORDER — ENOXAPARIN SODIUM 100 MG/ML
30 INJECTION SUBCUTANEOUS EVERY 24 HOURS
Status: DISCONTINUED | OUTPATIENT
Start: 2023-01-01 | End: 2023-01-01 | Stop reason: HOSPADM

## 2023-01-01 RX ORDER — ACETAMINOPHEN 650 MG/1
650 SUPPOSITORY RECTAL EVERY 6 HOURS PRN
Status: DISCONTINUED | OUTPATIENT
Start: 2023-01-01 | End: 2023-01-01 | Stop reason: HOSPADM

## 2023-01-01 RX ORDER — DOCUSATE SODIUM 100 MG/1
100 CAPSULE, LIQUID FILLED ORAL 2 TIMES DAILY
Qty: 60 CAPSULE | Refills: 1 | Status: SHIPPED | OUTPATIENT
Start: 2023-01-01

## 2023-01-01 RX ORDER — PREDNISONE 20 MG/1
20 TABLET ORAL DAILY
Status: DISCONTINUED | OUTPATIENT
Start: 2023-01-01 | End: 2023-01-01 | Stop reason: HOSPADM

## 2023-01-01 RX ORDER — GUAIFENESIN 600 MG/1
1200 TABLET, EXTENDED RELEASE ORAL 2 TIMES DAILY
Status: DISCONTINUED | OUTPATIENT
Start: 2023-01-01 | End: 2023-01-01 | Stop reason: HOSPADM

## 2023-01-01 RX ORDER — LEVALBUTEROL INHALATION SOLUTION 1.25 MG/3ML
1.25 SOLUTION RESPIRATORY (INHALATION)
Status: DISCONTINUED | OUTPATIENT
Start: 2023-01-01 | End: 2023-01-01

## 2023-01-01 RX ORDER — FLUTICASONE FUROATE AND VILANTEROL 100; 25 UG/1; UG/1
1 POWDER RESPIRATORY (INHALATION) DAILY
Status: DISCONTINUED | OUTPATIENT
Start: 2023-01-01 | End: 2023-01-01 | Stop reason: HOSPADM

## 2023-01-01 RX ORDER — LIDOCAINE 40 MG/G
CREAM TOPICAL
Status: DISCONTINUED | OUTPATIENT
Start: 2023-01-01 | End: 2023-01-01 | Stop reason: HOSPADM

## 2023-01-01 RX ORDER — GUAIFENESIN 600 MG/1
600 TABLET, EXTENDED RELEASE ORAL 2 TIMES DAILY
Qty: 60 TABLET | Refills: 1 | Status: SHIPPED | OUTPATIENT
Start: 2023-01-01

## 2023-01-01 RX ORDER — IPRATROPIUM BROMIDE AND ALBUTEROL SULFATE 2.5; .5 MG/3ML; MG/3ML
1 SOLUTION RESPIRATORY (INHALATION) EVERY 6 HOURS PRN
COMMUNITY

## 2023-01-01 RX ORDER — LEVALBUTEROL INHALATION SOLUTION 1.25 MG/3ML
1.25 SOLUTION RESPIRATORY (INHALATION) EVERY 4 HOURS PRN
Status: DISCONTINUED | OUTPATIENT
Start: 2023-01-01 | End: 2023-01-01 | Stop reason: HOSPADM

## 2023-01-01 RX ORDER — IPRATROPIUM BROMIDE AND ALBUTEROL SULFATE 2.5; .5 MG/3ML; MG/3ML
3 SOLUTION RESPIRATORY (INHALATION) ONCE
Status: COMPLETED | OUTPATIENT
Start: 2023-01-01 | End: 2023-01-01

## 2023-01-01 RX ORDER — LEVALBUTEROL TARTRATE 45 UG/1
2 AEROSOL, METERED ORAL EVERY 6 HOURS PRN
Status: DISCONTINUED | OUTPATIENT
Start: 2023-01-01 | End: 2023-01-01 | Stop reason: HOSPADM

## 2023-01-01 RX ORDER — TAMSULOSIN HYDROCHLORIDE 0.4 MG/1
0.4 CAPSULE ORAL DAILY
Qty: 30 CAPSULE | Refills: 1 | Status: CANCELLED | OUTPATIENT
Start: 2023-01-01

## 2023-01-01 RX ORDER — SERTRALINE HYDROCHLORIDE 25 MG/1
25 TABLET, FILM COATED ORAL DAILY
Status: DISCONTINUED | OUTPATIENT
Start: 2023-01-01 | End: 2023-01-01 | Stop reason: HOSPADM

## 2023-01-01 RX ORDER — LISINOPRIL 5 MG/1
5 TABLET ORAL AT BEDTIME
Status: DISCONTINUED | OUTPATIENT
Start: 2023-01-01 | End: 2023-01-01 | Stop reason: HOSPADM

## 2023-01-01 RX ORDER — BARIUM SULFATE 400 MG/ML
SUSPENSION ORAL ONCE
Status: COMPLETED | OUTPATIENT
Start: 2023-01-01 | End: 2023-01-01

## 2023-01-01 RX ORDER — ACETAMINOPHEN 325 MG/1
650 TABLET ORAL EVERY 6 HOURS PRN
Status: DISCONTINUED | OUTPATIENT
Start: 2023-01-01 | End: 2023-01-01 | Stop reason: HOSPADM

## 2023-01-01 RX ORDER — AMOXICILLIN 250 MG
1 CAPSULE ORAL 2 TIMES DAILY
Status: DISCONTINUED | OUTPATIENT
Start: 2023-01-01 | End: 2023-01-01 | Stop reason: HOSPADM

## 2023-01-01 RX ORDER — LEVALBUTEROL INHALATION SOLUTION 1.25 MG/3ML
1.25 SOLUTION RESPIRATORY (INHALATION) 4 TIMES DAILY
Status: DISCONTINUED | OUTPATIENT
Start: 2023-01-01 | End: 2023-01-01 | Stop reason: HOSPADM

## 2023-01-01 RX ORDER — AMOXICILLIN 250 MG
2 CAPSULE ORAL 2 TIMES DAILY
Status: DISCONTINUED | OUTPATIENT
Start: 2023-01-01 | End: 2023-01-01 | Stop reason: HOSPADM

## 2023-01-01 RX ORDER — SERTRALINE HYDROCHLORIDE 25 MG/1
25 TABLET, FILM COATED ORAL DAILY
COMMUNITY

## 2023-01-01 RX ORDER — ACETAMINOPHEN 325 MG/1
650 TABLET ORAL EVERY 4 HOURS PRN
Status: DISCONTINUED | OUTPATIENT
Start: 2023-01-01 | End: 2023-01-01 | Stop reason: HOSPADM

## 2023-01-01 RX ORDER — LEVALBUTEROL INHALATION SOLUTION 1.25 MG/3ML
1.25 SOLUTION RESPIRATORY (INHALATION) EVERY 6 HOURS PRN
Status: DISCONTINUED | OUTPATIENT
Start: 2023-01-01 | End: 2023-01-01 | Stop reason: HOSPADM

## 2023-01-01 RX ORDER — METHYLPREDNISOLONE SODIUM SUCCINATE 40 MG/ML
40 INJECTION, POWDER, LYOPHILIZED, FOR SOLUTION INTRAMUSCULAR; INTRAVENOUS EVERY 8 HOURS
Status: DISCONTINUED | OUTPATIENT
Start: 2023-01-01 | End: 2023-01-01

## 2023-01-01 RX ADMIN — GUAIFENESIN 600 MG: 600 TABLET ORAL at 08:09

## 2023-01-01 RX ADMIN — LEVALBUTEROL TARTRATE 2 PUFF: 45 AEROSOL, METERED ORAL at 15:02

## 2023-01-01 RX ADMIN — GUAIFENESIN 1200 MG: 600 TABLET ORAL at 20:11

## 2023-01-01 RX ADMIN — LEVALBUTEROL HYDROCHLORIDE 1.25 MG: 1.25 SOLUTION RESPIRATORY (INHALATION) at 15:47

## 2023-01-01 RX ADMIN — DOCUSATE SODIUM 50 MG AND SENNOSIDES 8.6 MG 1 TABLET: 8.6; 5 TABLET, FILM COATED ORAL at 07:59

## 2023-01-01 RX ADMIN — ALBUTEROL SULFATE 2.5 MG: 2.5 SOLUTION RESPIRATORY (INHALATION) at 13:27

## 2023-01-01 RX ADMIN — LEVALBUTEROL 1.25 MG: 1.25 SOLUTION RESPIRATORY (INHALATION) at 08:38

## 2023-01-01 RX ADMIN — LEVALBUTEROL HYDROCHLORIDE 1.25 MG: 1.25 SOLUTION RESPIRATORY (INHALATION) at 08:40

## 2023-01-01 RX ADMIN — LEVALBUTEROL HYDROCHLORIDE 1.25 MG: 1.25 SOLUTION RESPIRATORY (INHALATION) at 01:13

## 2023-01-01 RX ADMIN — AZITHROMYCIN MONOHYDRATE 500 MG: 500 INJECTION, POWDER, LYOPHILIZED, FOR SOLUTION INTRAVENOUS at 16:30

## 2023-01-01 RX ADMIN — PREDNISONE 40 MG: 20 TABLET ORAL at 08:25

## 2023-01-01 RX ADMIN — METHYLPREDNISOLONE SODIUM SUCCINATE 40 MG: 40 INJECTION INTRAMUSCULAR; INTRAVENOUS at 11:55

## 2023-01-01 RX ADMIN — LEVALBUTEROL HYDROCHLORIDE 1.25 MG: 1.25 SOLUTION RESPIRATORY (INHALATION) at 07:55

## 2023-01-01 RX ADMIN — LEVALBUTEROL HYDROCHLORIDE 1.25 MG: 1.25 SOLUTION RESPIRATORY (INHALATION) at 09:20

## 2023-01-01 RX ADMIN — IPRATROPIUM BROMIDE 0.5 MG: 0.5 SOLUTION RESPIRATORY (INHALATION) at 21:20

## 2023-01-01 RX ADMIN — DOCUSATE SODIUM 100 MG: 100 CAPSULE, LIQUID FILLED ORAL at 08:15

## 2023-01-01 RX ADMIN — DOCUSATE SODIUM 50 MG AND SENNOSIDES 8.6 MG 1 TABLET: 8.6; 5 TABLET, FILM COATED ORAL at 21:10

## 2023-01-01 RX ADMIN — IPRATROPIUM BROMIDE 0.5 MG: 0.5 SOLUTION RESPIRATORY (INHALATION) at 15:47

## 2023-01-01 RX ADMIN — LISINOPRIL 5 MG: 5 TABLET ORAL at 22:16

## 2023-01-01 RX ADMIN — GUAIFENESIN 1200 MG: 600 TABLET ORAL at 09:15

## 2023-01-01 RX ADMIN — PERFLUTREN 3 ML: 6.52 INJECTION, SUSPENSION INTRAVENOUS at 08:43

## 2023-01-01 RX ADMIN — METHYLPREDNISOLONE SODIUM SUCCINATE 125 MG: 125 INJECTION, POWDER, FOR SOLUTION INTRAMUSCULAR; INTRAVENOUS at 11:59

## 2023-01-01 RX ADMIN — SERTRALINE HYDROCHLORIDE 25 MG: 25 TABLET, FILM COATED ORAL at 07:51

## 2023-01-01 RX ADMIN — METHYLPREDNISOLONE SODIUM SUCCINATE 40 MG: 40 INJECTION INTRAMUSCULAR; INTRAVENOUS at 02:47

## 2023-01-01 RX ADMIN — GUAIFENESIN 1200 MG: 600 TABLET ORAL at 21:44

## 2023-01-01 RX ADMIN — IPRATROPIUM BROMIDE 0.5 MG: 0.5 SOLUTION RESPIRATORY (INHALATION) at 13:03

## 2023-01-01 RX ADMIN — IPRATROPIUM BROMIDE 0.5 MG: 0.5 SOLUTION RESPIRATORY (INHALATION) at 06:12

## 2023-01-01 RX ADMIN — LEVALBUTEROL 1.25 MG: 1.25 SOLUTION RESPIRATORY (INHALATION) at 06:12

## 2023-01-01 RX ADMIN — AZITHROMYCIN MONOHYDRATE 250 MG: 250 TABLET ORAL at 08:09

## 2023-01-01 RX ADMIN — IPRATROPIUM BROMIDE 0.5 MG: 0.5 SOLUTION RESPIRATORY (INHALATION) at 16:05

## 2023-01-01 RX ADMIN — METHYLPREDNISOLONE SODIUM SUCCINATE 40 MG: 40 INJECTION INTRAMUSCULAR; INTRAVENOUS at 21:11

## 2023-01-01 RX ADMIN — IPRATROPIUM BROMIDE 0.5 MG: 0.5 SOLUTION RESPIRATORY (INHALATION) at 08:38

## 2023-01-01 RX ADMIN — GUAIFENESIN 1200 MG: 600 TABLET ORAL at 09:22

## 2023-01-01 RX ADMIN — AZITHROMYCIN MONOHYDRATE 500 MG: 500 INJECTION, POWDER, LYOPHILIZED, FOR SOLUTION INTRAVENOUS at 16:40

## 2023-01-01 RX ADMIN — FLUTICASONE FUROATE AND VILANTEROL TRIFENATATE 1 PUFF: 100; 25 POWDER RESPIRATORY (INHALATION) at 11:02

## 2023-01-01 RX ADMIN — LEVALBUTEROL HYDROCHLORIDE 1.25 MG: 1.25 SOLUTION RESPIRATORY (INHALATION) at 14:21

## 2023-01-01 RX ADMIN — LEVALBUTEROL HYDROCHLORIDE 1.25 MG: 1.25 SOLUTION RESPIRATORY (INHALATION) at 20:02

## 2023-01-01 RX ADMIN — FLUTICASONE FUROATE AND VILANTEROL TRIFENATATE 1 PUFF: 100; 25 POWDER RESPIRATORY (INHALATION) at 11:53

## 2023-01-01 RX ADMIN — GUAIFENESIN 600 MG: 600 TABLET ORAL at 07:59

## 2023-01-01 RX ADMIN — IPRATROPIUM BROMIDE 0.5 MG: 0.5 SOLUTION RESPIRATORY (INHALATION) at 21:21

## 2023-01-01 RX ADMIN — DOCUSATE SODIUM 100 MG: 100 CAPSULE, LIQUID FILLED ORAL at 09:23

## 2023-01-01 RX ADMIN — GUAIFENESIN 600 MG: 600 TABLET ORAL at 07:47

## 2023-01-01 RX ADMIN — METOPROLOL SUCCINATE 25 MG: 25 TABLET, EXTENDED RELEASE ORAL at 08:25

## 2023-01-01 RX ADMIN — IPRATROPIUM BROMIDE 0.5 MG: 0.5 SOLUTION RESPIRATORY (INHALATION) at 13:05

## 2023-01-01 RX ADMIN — METOPROLOL SUCCINATE 25 MG: 25 TABLET, EXTENDED RELEASE ORAL at 07:47

## 2023-01-01 RX ADMIN — METOPROLOL SUCCINATE 25 MG: 25 TABLET, EXTENDED RELEASE ORAL at 08:09

## 2023-01-01 RX ADMIN — IPRATROPIUM BROMIDE 0.5 MG: 0.5 SOLUTION RESPIRATORY (INHALATION) at 19:44

## 2023-01-01 RX ADMIN — AZITHROMYCIN MONOHYDRATE 250 MG: 250 TABLET ORAL at 08:25

## 2023-01-01 RX ADMIN — LISINOPRIL 5 MG: 5 TABLET ORAL at 21:10

## 2023-01-01 RX ADMIN — AZITHROMYCIN MONOHYDRATE 250 MG: 250 TABLET ORAL at 08:37

## 2023-01-01 RX ADMIN — LEVALBUTEROL HYDROCHLORIDE 1.25 MG: 1.25 SOLUTION RESPIRATORY (INHALATION) at 21:19

## 2023-01-01 RX ADMIN — SERTRALINE HYDROCHLORIDE 25 MG: 25 TABLET, FILM COATED ORAL at 09:16

## 2023-01-01 RX ADMIN — IPRATROPIUM BROMIDE 0.5 MG: 0.5 SOLUTION RESPIRATORY (INHALATION) at 14:21

## 2023-01-01 RX ADMIN — ENOXAPARIN SODIUM 30 MG: 30 INJECTION SUBCUTANEOUS at 16:30

## 2023-01-01 RX ADMIN — DOCUSATE SODIUM 100 MG: 100 CAPSULE, LIQUID FILLED ORAL at 20:10

## 2023-01-01 RX ADMIN — DOCUSATE SODIUM 50 MG AND SENNOSIDES 8.6 MG 1 TABLET: 8.6; 5 TABLET, FILM COATED ORAL at 07:47

## 2023-01-01 RX ADMIN — LEVALBUTEROL HYDROCHLORIDE 1.25 MG: 1.25 SOLUTION RESPIRATORY (INHALATION) at 21:22

## 2023-01-01 RX ADMIN — DOCUSATE SODIUM 50 MG AND SENNOSIDES 8.6 MG 1 TABLET: 8.6; 5 TABLET, FILM COATED ORAL at 21:09

## 2023-01-01 RX ADMIN — LEVALBUTEROL 1.25 MG: 1.25 SOLUTION RESPIRATORY (INHALATION) at 00:12

## 2023-01-01 RX ADMIN — LEVALBUTEROL HYDROCHLORIDE 1.25 MG: 1.25 SOLUTION RESPIRATORY (INHALATION) at 13:04

## 2023-01-01 RX ADMIN — IPRATROPIUM BROMIDE AND ALBUTEROL SULFATE 3 ML: 2.5; .5 SOLUTION RESPIRATORY (INHALATION) at 11:39

## 2023-01-01 RX ADMIN — DOCUSATE SODIUM 50 MG AND SENNOSIDES 8.6 MG 1 TABLET: 8.6; 5 TABLET, FILM COATED ORAL at 19:51

## 2023-01-01 RX ADMIN — IPRATROPIUM BROMIDE 0.5 MG: 0.5 SOLUTION RESPIRATORY (INHALATION) at 09:20

## 2023-01-01 RX ADMIN — ENOXAPARIN SODIUM 30 MG: 30 INJECTION SUBCUTANEOUS at 16:34

## 2023-01-01 RX ADMIN — METHYLPREDNISOLONE SODIUM SUCCINATE 40 MG: 40 INJECTION INTRAMUSCULAR; INTRAVENOUS at 03:41

## 2023-01-01 RX ADMIN — LEVALBUTEROL HYDROCHLORIDE 1.25 MG: 1.25 SOLUTION RESPIRATORY (INHALATION) at 17:24

## 2023-01-01 RX ADMIN — IPRATROPIUM BROMIDE 0.5 MG: 0.5 SOLUTION RESPIRATORY (INHALATION) at 08:39

## 2023-01-01 RX ADMIN — LEVALBUTEROL HYDROCHLORIDE 1.25 MG: 1.25 SOLUTION RESPIRATORY (INHALATION) at 09:13

## 2023-01-01 RX ADMIN — PREDNISONE 20 MG: 20 TABLET ORAL at 12:07

## 2023-01-01 RX ADMIN — IPRATROPIUM BROMIDE 0.5 MG: 0.5 SOLUTION RESPIRATORY (INHALATION) at 01:13

## 2023-01-01 RX ADMIN — GUAIFENESIN 600 MG: 600 TABLET ORAL at 19:51

## 2023-01-01 RX ADMIN — LISINOPRIL 5 MG: 5 TABLET ORAL at 21:11

## 2023-01-01 RX ADMIN — METOPROLOL SUCCINATE 25 MG: 25 TABLET, EXTENDED RELEASE ORAL at 07:59

## 2023-01-01 RX ADMIN — PREDNISONE 40 MG: 20 TABLET ORAL at 08:09

## 2023-01-01 RX ADMIN — IPRATROPIUM BROMIDE 0.5 MG: 0.5 SOLUTION RESPIRATORY (INHALATION) at 20:05

## 2023-01-01 RX ADMIN — METOPROLOL SUCCINATE 12.5 MG: 25 TABLET, EXTENDED RELEASE ORAL at 09:14

## 2023-01-01 RX ADMIN — ENOXAPARIN SODIUM 30 MG: 30 INJECTION SUBCUTANEOUS at 17:45

## 2023-01-01 RX ADMIN — ENOXAPARIN SODIUM 30 MG: 30 INJECTION SUBCUTANEOUS at 16:40

## 2023-01-01 RX ADMIN — FLUTICASONE FUROATE AND VILANTEROL TRIFENATATE 1 PUFF: 100; 25 POWDER RESPIRATORY (INHALATION) at 08:15

## 2023-01-01 RX ADMIN — METHYLPREDNISOLONE SODIUM SUCCINATE 40 MG: 40 INJECTION, POWDER, FOR SOLUTION INTRAMUSCULAR; INTRAVENOUS at 17:10

## 2023-01-01 RX ADMIN — DOCUSATE SODIUM 100 MG: 100 CAPSULE, LIQUID FILLED ORAL at 19:59

## 2023-01-01 RX ADMIN — LEVALBUTEROL HYDROCHLORIDE 1.25 MG: 1.25 SOLUTION RESPIRATORY (INHALATION) at 07:11

## 2023-01-01 RX ADMIN — SERTRALINE HYDROCHLORIDE 25 MG: 25 TABLET, FILM COATED ORAL at 08:25

## 2023-01-01 RX ADMIN — IPRATROPIUM BROMIDE 0.5 MG: 0.5 SOLUTION RESPIRATORY (INHALATION) at 07:56

## 2023-01-01 RX ADMIN — TAMSULOSIN HYDROCHLORIDE 0.4 MG: 0.4 CAPSULE ORAL at 17:45

## 2023-01-01 RX ADMIN — IPRATROPIUM BROMIDE 0.5 MG: 0.5 SOLUTION RESPIRATORY (INHALATION) at 00:12

## 2023-01-01 RX ADMIN — DOCUSATE SODIUM 100 MG: 100 CAPSULE, LIQUID FILLED ORAL at 21:44

## 2023-01-01 RX ADMIN — GUAIFENESIN 600 MG: 600 TABLET ORAL at 21:10

## 2023-01-01 RX ADMIN — DOCUSATE SODIUM 50 MG AND SENNOSIDES 8.6 MG 1 TABLET: 8.6; 5 TABLET, FILM COATED ORAL at 08:09

## 2023-01-01 RX ADMIN — PREDNISONE 20 MG: 20 TABLET ORAL at 09:14

## 2023-01-01 RX ADMIN — LEVALBUTEROL HYDROCHLORIDE 1.25 MG: 1.25 SOLUTION RESPIRATORY (INHALATION) at 20:05

## 2023-01-01 RX ADMIN — LEVALBUTEROL HYDROCHLORIDE 1.25 MG: 1.25 SOLUTION RESPIRATORY (INHALATION) at 16:18

## 2023-01-01 RX ADMIN — IPRATROPIUM BROMIDE 0.5 MG: 0.5 SOLUTION RESPIRATORY (INHALATION) at 17:24

## 2023-01-01 RX ADMIN — DOCUSATE SODIUM 50 MG AND SENNOSIDES 8.6 MG 1 TABLET: 8.6; 5 TABLET, FILM COATED ORAL at 08:24

## 2023-01-01 RX ADMIN — LISINOPRIL 5 MG: 5 TABLET ORAL at 21:28

## 2023-01-01 RX ADMIN — METHYLPREDNISOLONE SODIUM SUCCINATE 40 MG: 40 INJECTION, POWDER, FOR SOLUTION INTRAMUSCULAR; INTRAVENOUS at 16:44

## 2023-01-01 RX ADMIN — SERTRALINE HYDROCHLORIDE 25 MG: 25 TABLET, FILM COATED ORAL at 07:59

## 2023-01-01 RX ADMIN — LEVALBUTEROL HYDROCHLORIDE 1.25 MG: 1.25 SOLUTION RESPIRATORY (INHALATION) at 13:05

## 2023-01-01 RX ADMIN — SERTRALINE HYDROCHLORIDE 25 MG: 25 TABLET, FILM COATED ORAL at 08:09

## 2023-01-01 RX ADMIN — LEVALBUTEROL HYDROCHLORIDE 1.25 MG: 1.25 SOLUTION RESPIRATORY (INHALATION) at 19:44

## 2023-01-01 RX ADMIN — SERTRALINE HYDROCHLORIDE 25 MG: 25 TABLET, FILM COATED ORAL at 08:20

## 2023-01-01 RX ADMIN — DOCUSATE SODIUM 50 MG AND SENNOSIDES 8.6 MG 1 TABLET: 8.6; 5 TABLET, FILM COATED ORAL at 21:28

## 2023-01-01 RX ADMIN — IPRATROPIUM BROMIDE 0.5 MG: 0.5 SOLUTION RESPIRATORY (INHALATION) at 20:02

## 2023-01-01 RX ADMIN — IPRATROPIUM BROMIDE 0.5 MG: 0.5 SOLUTION RESPIRATORY (INHALATION) at 07:11

## 2023-01-01 RX ADMIN — GUAIFENESIN 600 MG: 600 TABLET ORAL at 21:28

## 2023-01-01 RX ADMIN — GUAIFENESIN 1200 MG: 600 TABLET ORAL at 08:15

## 2023-01-01 RX ADMIN — GUAIFENESIN 600 MG: 600 TABLET ORAL at 08:25

## 2023-01-01 RX ADMIN — IPRATROPIUM BROMIDE 0.5 MG: 0.5 SOLUTION RESPIRATORY (INHALATION) at 09:13

## 2023-01-01 RX ADMIN — GUAIFENESIN 1200 MG: 600 TABLET ORAL at 19:59

## 2023-01-01 RX ADMIN — BARIUM SULFATE 60 ML: 400 SUSPENSION ORAL at 10:12

## 2023-01-01 RX ADMIN — METOPROLOL SUCCINATE 12.5 MG: 25 TABLET, EXTENDED RELEASE ORAL at 08:15

## 2023-01-01 RX ADMIN — PREDNISONE 40 MG: 20 TABLET ORAL at 08:36

## 2023-01-01 RX ADMIN — SERTRALINE HYDROCHLORIDE 25 MG: 25 TABLET, FILM COATED ORAL at 11:02

## 2023-01-01 ASSESSMENT — ACTIVITIES OF DAILY LIVING (ADL)
ADLS_ACUITY_SCORE: 31
ADLS_ACUITY_SCORE: 41
ADLS_ACUITY_SCORE: 31
ADLS_ACUITY_SCORE: 29
DRESSING/BATHING_DIFFICULTY: NO
ADLS_ACUITY_SCORE: 42
ADLS_ACUITY_SCORE: 29
ADLS_ACUITY_SCORE: 23
ADLS_ACUITY_SCORE: 36
ADLS_ACUITY_SCORE: 29
ADLS_ACUITY_SCORE: 23
ADLS_ACUITY_SCORE: 29
ADLS_ACUITY_SCORE: 45
ADLS_ACUITY_SCORE: 35
DIFFICULTY_EATING/SWALLOWING: NO
ADLS_ACUITY_SCORE: 36
ADLS_ACUITY_SCORE: 35
ADLS_ACUITY_SCORE: 23
ADLS_ACUITY_SCORE: 29
CONCENTRATING,_REMEMBERING_OR_MAKING_DECISIONS_DIFFICULTY: YES
ADLS_ACUITY_SCORE: 23
ADLS_ACUITY_SCORE: 33
ADLS_ACUITY_SCORE: 35
ADLS_ACUITY_SCORE: 29
ADLS_ACUITY_SCORE: 23
ADLS_ACUITY_SCORE: 35
ADLS_ACUITY_SCORE: 35
FALL_HISTORY_WITHIN_LAST_SIX_MONTHS: NO
ADLS_ACUITY_SCORE: 29
ADLS_ACUITY_SCORE: 36
CONCENTRATING,_REMEMBERING_OR_MAKING_DECISIONS_DIFFICULTY: YES
ADLS_ACUITY_SCORE: 23
ADLS_ACUITY_SCORE: 29
ADLS_ACUITY_SCORE: 31
ADLS_ACUITY_SCORE: 29
ADLS_ACUITY_SCORE: 36
ADLS_ACUITY_SCORE: 36
ADLS_ACUITY_SCORE: 29
DEPENDENT_IADLS:: CLEANING;COOKING;LAUNDRY;SHOPPING;MEAL PREPARATION;MEDICATION MANAGEMENT;MONEY MANAGEMENT;TRANSPORTATION;INCONTINENCE
ADLS_ACUITY_SCORE: 23
WEAR_GLASSES_OR_BLIND: YES
ADLS_ACUITY_SCORE: 23
ADLS_ACUITY_SCORE: 29
DIFFICULTY_EATING/SWALLOWING: NO
ADLS_ACUITY_SCORE: 23
DEPENDENT_IADLS:: CLEANING;COOKING;LAUNDRY;SHOPPING;MEAL PREPARATION;MEDICATION MANAGEMENT;MONEY MANAGEMENT;TRANSPORTATION;INCONTINENCE
ADLS_ACUITY_SCORE: 29
ADLS_ACUITY_SCORE: 23
ADLS_ACUITY_SCORE: 29
ADLS_ACUITY_SCORE: 45
ADLS_ACUITY_SCORE: 23
ADLS_ACUITY_SCORE: 35
ADLS_ACUITY_SCORE: 35
ADLS_ACUITY_SCORE: 29
ADLS_ACUITY_SCORE: 32
DOING_ERRANDS_INDEPENDENTLY_DIFFICULTY: NO
ADLS_ACUITY_SCORE: 35
ADLS_ACUITY_SCORE: 31
ADLS_ACUITY_SCORE: 29
ADLS_ACUITY_SCORE: 34
ADLS_ACUITY_SCORE: 29
ADLS_ACUITY_SCORE: 42
ADLS_ACUITY_SCORE: 35
ADLS_ACUITY_SCORE: 45
ADLS_ACUITY_SCORE: 42
TOILETING_ISSUES: NO
ADLS_ACUITY_SCORE: 29
ADLS_ACUITY_SCORE: 29
WALKING_OR_CLIMBING_STAIRS_DIFFICULTY: NO
ADLS_ACUITY_SCORE: 31
DIFFICULTY_COMMUNICATING: YES
ADLS_ACUITY_SCORE: 29
ADLS_ACUITY_SCORE: 29
ADLS_ACUITY_SCORE: 35
ADLS_ACUITY_SCORE: 36
ADLS_ACUITY_SCORE: 45
ADLS_ACUITY_SCORE: 31
ADLS_ACUITY_SCORE: 37
ADLS_ACUITY_SCORE: 29
CHANGE_IN_FUNCTIONAL_STATUS_SINCE_ONSET_OF_CURRENT_ILLNESS/INJURY: YES
ADLS_ACUITY_SCORE: 29
HEARING_DIFFICULTY_OR_DEAF: NO
ADLS_ACUITY_SCORE: 29
ADLS_ACUITY_SCORE: 36
ADLS_ACUITY_SCORE: 29
ADLS_ACUITY_SCORE: 42
ADLS_ACUITY_SCORE: 36
ADLS_ACUITY_SCORE: 36
ADLS_ACUITY_SCORE: 29
ADLS_ACUITY_SCORE: 41
ADLS_ACUITY_SCORE: 29

## 2023-01-01 ASSESSMENT — ENCOUNTER SYMPTOMS
VOMITING: 1
SHORTNESS OF BREATH: 1
DIARRHEA: 1

## 2023-04-14 PROBLEM — J44.1 COPD EXACERBATION (H): Status: ACTIVE | Noted: 2018-08-23

## 2023-04-14 PROBLEM — M62.81 GENERALIZED MUSCLE WEAKNESS: Status: ACTIVE | Noted: 2022-01-01

## 2023-04-14 NOTE — ED NOTES
Bed: JNEDH-G  Expected date: 4/14/23  Expected time: 10:42 AM  Means of arrival:   Comments:  Chf/spf

## 2023-04-14 NOTE — PHARMACY-ADMISSION MEDICATION HISTORY
Pharmacist Admission Medication History    Admission medication history is complete. The information provided in this note is only as accurate as the sources available at the time of the update.    Medication reconciliation/reorder completed by provider prior to medication history? No    Information Source(s): Patient, Family member and CareEverywhere/SureScripts via in-person and phone    Pertinent Information: none    Changes made to PTA medication list:    Added: sertraline and duonebs    Deleted: None    Changed: None        Allergies reviewed with patient and updates made in EHR: yes    Medication History Completed By: Willard Martinez RP 4/14/2023 12:16 PM    PTA Med List   Medication Sig Last Dose     acetaminophen (TYLENOL) 325 MG tablet Take 2 tablets (650 mg) by mouth every 4 hours as needed for other (mild pain (1-3)) Unknown     fluticasone-salmeterol (ADVAIR) 250-50 MCG/DOSE inhaler Inhale 1 puff into the lungs 2 times daily 4/13/2023 at pm     ipratropium - albuterol 0.5 mg/2.5 mg/3 mL (DUONEB) 0.5-2.5 (3) MG/3ML neb solution Take 1 vial by nebulization every 6 hours as needed for shortness of breath, wheezing or cough 4/13/2023 at am     lisinopril (PRINIVIL,ZESTRIL) 5 MG tablet [LISINOPRIL (PRINIVIL,ZESTRIL) 5 MG TABLET] Take 1 tablet (5 mg total) by mouth at bedtime. 4/13/2023 at pm     metoprolol succinate ER (TOPROL XL) 25 MG 24 hr tablet Take 1 tablet (25 mg) by mouth daily 4/13/2023 at am     sertraline (ZOLOFT) 25 MG tablet Take 25 mg by mouth daily 4/13/2023 at am

## 2023-04-14 NOTE — H&P
[facility]    History and Physical - Hospitalist Service       Date of Admission:  4/14/2023    Assessment & Plan    Patient is a 89-year-old male with history of bronchiectasis, COPD and dementia admitted 4/14/2023 with COPD exacerbation.    #COPD/bronchiectasis exacerbation   Scheduled Duonebs   Resume PTA Advair   IV solumedrol   Supplemental O2 prn O2 sat <88%    #Generalized weakness/debility  #Dementia   Family open to discussing the possibility of hospice.    Obtain Palliative care consult    #Hypertension   Resume PTA meds       DVT ppx: PCDs     Diet: Mechanical/Dental Soft Diet    DVT Prophylaxis: Pneumatic Compression Devices  Dejesus Catheter: Not present  Lines: None     Cardiac Monitoring: None  Code Status: No CPR- Do NOT Intubate      Clinically Significant Risk Factors Present on Admission          # Hypocalcemia: Lowest Ca = 8.2 mg/dL in last 2 days, will monitor and replace as appropriate         # Hypertension: home medication list includes antihypertensive(s)   # Dementia: noted on problem list            Disposition Plan      Expected Discharge Date: 04/16/2023                  Russell Ramirez, DO  Hospitalist Service  [facility]  Securely message with [employer] (more info)  Text page via [facility] Paging/Directory     ______________________________________________________________________    Chief Complaint   Generalized weakness    History of Present Illness   Patient is a 89-year-old male with history of bronchiectasis, COPD and dementia who presents to the emergency department with generalized weakness and shortness of breath.  Patient reports trouble walking due to weakness.  He denies feeling short of breath right now but was when he initially came in.  He did receive a Xopenex breathing treatment.  Patient thinks he wears oxygen at home but only as needed.  He has had no fevers, chills, nausea, vomiting, syncope.  He does have a chronic cough  productive of clear sputum.      Past Medical History    Past Medical History:   Diagnosis Date     Chronic HFrEF (heart failure with reduced ejection fraction) (H)      COPD (chronic obstructive pulmonary disease) (H)      LBBB (left bundle branch block)        Past Surgical History   Past Surgical History:   Procedure Laterality Date     EP PACEMAKER DEVICE & LEAD IMPLANT- RIGHT ATRIAL & RIGHT VENTRICULAR N/A 12/16/2022    Procedure: Pacemaker Device & Lead Implant - Right Atrial & Right Ventricular;  Surgeon: Armida Lopez MD;  [place] [facility] [place] [place]     ESOPHAGOSCOPY, GASTROSCOPY, DUODENOSCOPY (EGD), COMBINED N/A 2/21/2022    Procedure: ESOPHAGOGASTRODUODENOSCOPY (EGD) WITH BIOPSIES;  Surgeon: Daniel Guzman DO;  Location: [facility] OR     HEMORRHOID SURGERY  1951     [place] ESOPHAGOGASTRODUODENOSCOPY TRANSORAL DIAGNOSTIC N/A 10/21/2018    Procedure: ESOPHAGOGASTRODUODENOSCOPY (EGD) with biopsy;  Surgeon: Masoud Machuca MD;  Location: [facility];  Service: Gastroenterology     SIGMOIDOSCOPY FLEXIBLE N/A 2/21/2022    Procedure: SIGMOIDOSCOPY, FLEXIBLE;  Surgeon: Dainel Guzman DO;  Location: [facility] OR     TONSILLECTOMY  1943       Prior to Admission Medications   Prior to Admission Medications   Prescriptions Last Dose Informant Patient Reported? Taking?   acetaminophen (TYLENOL) 325 MG tablet Unknown  No Yes   Sig: Take 2 tablets (650 mg) by mouth every 4 hours as needed for other (mild pain (1-3))   fluticasone-salmeterol (ADVAIR) 250-50 MCG/DOSE inhaler 4/13/2023 at pm  Yes Yes   Sig: Inhale 1 puff into the lungs 2 times daily   ipratropium - albuterol 0.5 mg/2.5 mg/3 mL (DUONEB) 0.5-2.5 (3) MG/3ML neb solution 4/13/2023 at am  Yes Yes   Sig: Take 1 vial by nebulization every 6 hours as needed for shortness of breath, wheezing or cough   lisinopril (PRINIVIL,ZESTRIL) 5 MG tablet 4/13/2023 at pm  No Yes   Sig: [LISINOPRIL (PRINIVIL,ZESTRIL) 5 MG TABLET] Take 1 tablet (5 mg total)  by mouth at bedtime.   metoprolol succinate ER (TOPROL XL) 25 MG 24 hr tablet 4/13/2023 at am  No Yes   Sig: Take 1 tablet (25 mg) by mouth daily   sertraline (ZOLOFT) 25 MG tablet 4/13/2023 at am  Yes Yes   Sig: Take 25 mg by mouth daily      Facility-Administered Medications: None        Review of Systems    CONSTITUTIONAL: NEGATIVE for fever, chills  RESP: POSITIVE for cough and SOB  CV: NEGATIVE for chest pain, palpitations or peripheral edema  GI: NEGATIVE for nausea and vomiting    Physical Exam   Vital Signs: Temp: 98  F (36.7  C) Temp src: Oral BP: (!) 148/76 Pulse: 69   Resp: 18 SpO2: 97 % O2 Device: None (Room air)    Weight: 110 lbs 0 oz    General Appearance:  No acute distress  Respiratory: Clear to auscultation bilaterally  Cardiovascular: Regular rate and rhythm  Extremities: No peripheral edema or cyanosis    Medical Decision Making             Data     I have personally reviewed the following data over the past 24 hrs:    6.4  \   11.0 (L)   / 167     142 106 28.8 (H) /  80   4.7 28 1.00 \       ALT: 9 (L) AST: 25 AP: 81 TBILI: 0.5   ALB: 3.5 TOT PROTEIN: 6.2 (L) LIPASE: N/A       Trop: 42 (H) BNP: 1,334       Imaging results reviewed over the past 24 hrs:   Recent Results (from the past 24 hour(s))   Chest XR,  PA & LAT    Narrative    EXAM: XR CHEST 2 VIEWS  LOCATION: [facility]  DATE/TIME: 4/14/2023 12:37 PM CDT    INDICATION: sob  COMPARISON: 03/15/2023 and older studies. CTA chest 01/29/2019      Impression    IMPRESSION: Dual-lead left subclavian venous pacer. Leads are intact and unchanged in position.    Small cluster of calcified granulomas in the right upper lobe peripherally again noted. Lungs are otherwise clear. Heart is of normal size. Descending thoracic aorta is quite ectatic. Central pulmonary vessels are enlarged. No signs of failure or   pneumonia.

## 2023-04-14 NOTE — ED PROVIDER NOTES
"  Emergency Department Encounter     FINAL IMPRESSION:  Failure to thrive, shortness of breath      ED COURSE AND MEDICAL DECISION MAKING     ED Course as of 04/14/23 1539   Fri Apr 14, 2023   1118 89-year-old male with extensive past medical history, DNR/DNI here with worsening breathing.  Patient's PCA at home called 911 and brought him in because of concerns of breathing issues.  Patient has no complaints.  States his breathing is \"fine.\"  History otherwise limited because of patient's dementia.  Vital stable on arrival.  Patient looks cachectic.  He has congestion bilaterally in his lungs as well as wheezing in the apices.  He shows no signs of accessory muscle use or signs of respiratory compromise.  He has bilateral lower extremity symmetrical edema.  His abdomen is benign.  Discussed case with patient's wife over the phone Louann, seems like she may be struggling at home keeping up with his cares.  They do have a PCA.  Plan for cardiopulmonary work-up here and reevaluate.   1135 EKG sinus 60, left bundle branch block present which is chronic, no discordant changes, inversion laterally which is chronic.  QTc is 498.   -Labs overall reassuring.  Chest x-ray overall reassuring.  Extensive discussion with family regarding disposition.  I think patient would benefit from hospice consultation.  Pennie family concern about ability to take care of patient at home.  Plan for admission n    11:09 AM I met with the patient, obtained history, performed an initial exam, and discussed options and plan for diagnostics and treatment here in the ED.   11:13 AM Spoke with patient's wife, Louann, and obtained additional patient history.   2:26 PM Updated Louann on patient's condition and further discussed patient's care plan.   3:07 PM Nursing staff reports patient's family are agreeable with patient admission.   3:08 PM Spoke with Louann.   3:10 PM Paged hospitalist.   3:38 PM Spoke with Dr. Russell Ramirez, hospitalist, who " agrees to accept patient for admission.         Medical Decision Making    History:    Supplemental history from: Family Member/Significant Other and EMS    External Record(s) reviewed: Documented in chart, if applicable.    Work Up:    Chart documentation includes differential considered and any EKGs or imaging independently interpreted by provider, where specified.    In additional to work up documented, I considered the following work up: Documented in chart, if applicable.    External consultation:    Discussion of management with another provider: Hospitalist    Complicating factors:    Care impacted by chronic illness: Chronic Lung Disease, Dementia and Other: CHF    Care affected by social determinants of health: N/A    Disposition considerations: Admit.        EKG  EKG sinus 60, left bundle branch block present which is chronic, no discordant changes, inversion laterally which is chronic.  QTc is 498.      Critical Care     Performed by: Daniel Deluna or    Authorized by: Daniel Deluna  Total critical care time:  minutes  Critical care was necessary to treat or prevent imminent or life-threatening deterioration of the following conditions:   Critical care was time spent personally by me on the following activities: development of treatment plan with patient or surrogate, discussions with consultants, examination of patient, evaluation of patient's response to treatment, obtaining history from patient or surrogate, ordering and performing treatments and interventions, ordering and review of laboratory studies, ordering and review of radiographic studies, re-evaluation of patient's condition and monitoring for potential decompensation.  Critical care time was exclusive of separately billable procedures and treating other patients.'    At the conclusion of the encounter I discussed the results of all the tests and the disposition. The questions were answered. The patient or family acknowledged understanding and was  "agreeable with the care plan.        MEDICATIONS GIVEN IN THE EMERGENCY DEPARTMENT:  Medications   levalbuterol (XOPENEX HFA) 45 MCG/ACT Inhaler 2 puff (2 puffs Inhalation $Given 4/14/23 3120)       NEW PRESCRIPTIONS STARTED AT TODAY'S ED VISIT:  New Prescriptions    No medications on file       HPI     Patient information obtained from: EMS report, Patient    Use of Interpretor: N/A     HPI is limited due to dementia.     Billie Garcias is a 89 year old male with a pertinent history of chronic HFrEF, COPD, LBBB, dilated cardiomyopathy, bronchiectasis, and dementia who presents to this ED by ambulance escorted by self for evaluation of shortness of breath.     Per EMS, patient lives at home with his wife. They have PCA visit them. Today, wife and PCA noted that patient seemed to be short of breath and \"breathing kind of funny\". Patient is noted to have wheezing with expiration.    Per patient, he denies shortness of breath or changes in appetite. Patient states his breathing is \"fine.\" Patient denies abdominal pain, chest pain, or any additional complaints at this time.     Patient is retired and previously worked in a factory assembly line for 50 years.      REVIEW OF SYSTEMS:  Review of Systems   ROS is limited due to dementia.             MEDICAL HISTORY     Past Medical History:   Diagnosis Date     Chronic HFrEF (heart failure with reduced ejection fraction) (H)      COPD (chronic obstructive pulmonary disease) (H)      LBBB (left bundle branch block)        Past Surgical History:   Procedure Laterality Date     EP PACEMAKER DEVICE & LEAD IMPLANT- RIGHT ATRIAL & RIGHT VENTRICULAR N/A 12/16/2022    Procedure: Pacemaker Device & Lead Implant - Right Atrial & Right Ventricular;  Surgeon: Armida Lopez MD;  Location: [facility] CV     ESOPHAGOSCOPY, GASTROSCOPY, DUODENOSCOPY (EGD), COMBINED N/A 2/21/2022    Procedure: ESOPHAGOGASTRODUODENOSCOPY (EGD) WITH BIOPSIES;  Surgeon: Daniel Gumzan DO; "  Location: [facility] OR     HEMORRHOID SURGERY       [place] ESOPHAGOGASTRODUODENOSCOPY TRANSORAL DIAGNOSTIC N/A 10/21/2018    Procedure: ESOPHAGOGASTRODUODENOSCOPY (EGD) with biopsy;  Surgeon: Masoud Machuca MD;  Location: [facility];  Service: Gastroenterology     SIGMOIDOSCOPY FLEXIBLE N/A 2022    Procedure: SIGMOIDOSCOPY, FLEXIBLE;  Surgeon: Daniel Guzman DO;  Location: [facility] OR     TONSILLECTOMY         Social History     Tobacco Use     Smoking status: Former     Packs/day: 1.00     Years: 16.00     Pack years: 16.00     Types: Cigarettes     Quit date: 1957     Years since quittin.3     Smokeless tobacco: Never   Substance Use Topics     Alcohol use: No     Drug use: No       acetaminophen (TYLENOL) 325 MG tablet  fluticasone-salmeterol (ADVAIR) 250-50 MCG/DOSE inhaler  ipratropium - albuterol 0.5 mg/2.5 mg/3 mL (DUONEB) 0.5-2.5 (3) MG/3ML neb solution  lisinopril (PRINIVIL,ZESTRIL) 5 MG tablet  metoprolol succinate ER (TOPROL XL) 25 MG 24 hr tablet  sertraline (ZOLOFT) 25 MG tablet            PHYSICAL EXAM     BP (!) 148/76   Pulse 69   Temp 98  F (36.7  C) (Oral)   Resp 18   Wt 49.9 kg (110 lb)   SpO2 97%   BMI 14.51 kg/m        PHYSICAL EXAM:     General: Patient appears cachetic.   HEENT: Moist mucous membranes,  No head trauma.  No midline neck pain.  Cardiovascular: Normal rate, normal rhythm, bilateral lower extremity symmetrical edema. .  No appreciable murmur.  Respiratory: Bilateral congestion in his lungs, wheezing in the apices. No signs of accessory muscle use of respiratory compromise.   Abdominal: Soft, nontender, nondistended, no palpable masses, no guarding, no rebound  Musculoskeletal: Full range of motion of joints, no deformities appreciated.   Neurological: Alert and oriented, grossly neurologically intact.  Psychological: Normal affect and mood.  Integument: No rashes appreciated        RESULTS       Labs Ordered and Resulted from Time of ED  Arrival to Time of ED Departure   COMPREHENSIVE METABOLIC PANEL - Abnormal       Result Value    Sodium 142      Potassium 4.7      Chloride 106      Carbon Dioxide (CO2) 28      Anion Gap 8      Urea Nitrogen 28.8 (*)     Creatinine 1.00      Calcium 8.2      Glucose 80      Alkaline Phosphatase 81      AST 25      ALT 9 (*)     Protein Total 6.2 (*)     Albumin 3.5      Bilirubin Total 0.5      GFR Estimate 71     TROPONIN T, HIGH SENSITIVITY - Abnormal    Troponin T, High Sensitivity 43 (*)    DIGOXIN LEVEL - Abnormal    Digoxin <0.4 (*)    CBC WITH PLATELETS AND DIFFERENTIAL - Abnormal    WBC Count 6.4      RBC Count 3.37 (*)     Hemoglobin 11.0 (*)     Hematocrit 33.9 (*)      (*)     MCH 32.6      MCHC 32.4      RDW 13.0      Platelet Count 167      % Neutrophils 52      % Lymphocytes 23      % Monocytes 10      % Eosinophils 13      % Basophils 1      % Immature Granulocytes 1      NRBCs per 100 WBC 0      Absolute Neutrophils 3.4      Absolute Lymphocytes 1.5      Absolute Monocytes 0.6      Absolute Eosinophils 0.8 (*)     Absolute Basophils 0.1      Absolute Immature Granulocytes 0.0      Absolute NRBCs 0.0     TROPONIN T, HIGH SENSITIVITY - Abnormal    Troponin T, High Sensitivity 42 (*)    MAGNESIUM - Normal    Magnesium 2.2     NT PROBNP INPATIENT - Normal    N terminal Pro BNP Inpatient 1,334         Chest XR,  PA & LAT   Final Result   IMPRESSION: Dual-lead left subclavian venous pacer. Leads are intact and unchanged in position.      Small cluster of calcified granulomas in the right upper lobe peripherally again noted. Lungs are otherwise clear. Heart is of normal size. Descending thoracic aorta is quite ectatic. Central pulmonary vessels are enlarged. No signs of failure or    pneumonia.          PROCEDURES:  Procedures:  Procedures       I, Cl Toscano am serving as a scribe to document services personally performed by Daniel Deluna DO, based on my observations and the provider's statements to  me.  I, Daniel Deluna DO, attest that [place] Rita Toscano is acting in a scribe capacity, has observed my performance of the services and has documented them in accordance with my direction.    Daniel Deluna DO  Emergency Medicine  [facility] EMERGENCY DEPARTMENT     Daniel Deluna DO  04/14/23 1748

## 2023-04-14 NOTE — ED TRIAGE NOTES
Pt arrives per SPF from own home where he lives with his wife. Wife wanted him to be seen the past few days due to change in breathing per normal. Patient denies feeling SOB or pain upon arrival to ED. Breath sounds wheezing worse upon expirations. Bi lateral lower extremities pitting edema. Pacemaker demand pace.      Triage Assessment     Row Name 04/14/23 1118       Triage Assessment (Adult)    Airway WDL WDL    Additional Documentation Breath Sounds (Group)       Respiratory WDL    Respiratory WDL X       Breath Sounds    Breath Sounds All Fields    All Lung Fields Breath Sounds wheezes, expiratory       Skin Circulation/Temperature WDL    Skin Circulation/Temperature WDL WDL       Cardiac WDL    Cardiac WDL X;rhythm    Pulse Rate & Regularity bradycardic       Peripheral/Neurovascular WDL    Peripheral Neurovascular WDL WDL       Cognitive/Neuro/Behavioral WDL    Cognitive/Neuro/Behavioral WDL X    Level of Consciousness confused    Orientation disoriented to;time;situation    Mood/Behavior calm;cooperative       [place] Coma Scale    Best Eye Response 4-->(E4) spontaneous    Best Motor Response 6-->(M6) obeys commands    Best Verbal Response 4-->(V4) confused    Lorenzo Coma Scale Score 14

## 2023-04-14 NOTE — ED NOTES
Pt ate about 50% of meal.  Coughing hard to get mucous up. Turning red while coughing. Lung sounds wheezing, worse with expiration. Daughter and wife notified regarding staying for observation. Inhaler given and Dr notified about worsening condition.

## 2023-04-14 NOTE — ED NOTES
Bed: JNED-39  Expected date: 4/14/23  Expected time: 5:06 PM  Means of arrival:   Comments:  CATHERINE G

## 2023-04-14 NOTE — ED NOTES
Pt has freq cough productive of thick clear sputum. He becomes red in the face and has audible wheezing , using accessory muscles. O2 sat 93% RA provider updated.

## 2023-04-15 NOTE — PROGRESS NOTES
Date of Admission:  2023  Hospital Day: 2    Pain/ discomfort: denies.    Problem List:  Generalized muscle weakness [M62.81]   Assessment:  Problem: Plan of Care - These are the overarching goals to be used throughout the patient stay.    Goal: Optimal Comfort and Wellbeing  Outcome: Progressing   Goal Outcome Evaluation:    -vitals stable  -RT gave scheduled MAR Neb Rx.  -AOx1 (oriented only to self), hx of dementia per team note.  -requested DVT ppx: PCDs. No device arrived yet for pt.  -pt very weak just with turns. He wanted to be left sleeping overnight if sleeping, so no turn and reposition while sleeping.  Progress:  No acute events.    /58 (Patient Position: Semi-Cardozo's, Cuff Size: Adult Small)   Pulse 72   Temp 98.1  F (36.7  C) (Oral)   Resp 26   Wt 49.9 kg (110 lb)   SpO2 94%   BMI 14.51 kg/m      Respiratory monitoring:   Resp: 26      Cardiac Monitoring: None       I/O last 3 completed shifts:  In: -   Out: 300 [Urine:300]    Wt Readings from Last 2 Encounters:   23 49.9 kg (110 lb)   22 50 kg (110 lb 4.8 oz)        24 Hour Vital Signs Summary:  Temp: 98.1  F (36.7  C) Temp  Min: 98  F (36.7  C)  Max: 98.1  F (36.7  C)  Resp: 26 Resp  Min: 16  Max: 26  SpO2: 94 % SpO2  Min: 93 %  Max: 98 %  Pulse: 72 Pulse  Min: 55  Max: 87    No data recorded  BP: 114/58 Systolic (24hrs), Av , Min:95 , Max:169   Diastolic (24hrs), Av, Min:53, Max:88      Oziel Meredith RN  .................................................... April 15, 2023   4:30 AM  [facility]

## 2023-04-15 NOTE — CONSULTS
Care Management Initial Consult    General Information  Assessment completed with: Spouse or significant other, spouse Pat  Type of CM/SW Visit: Initial Assessment    Primary Care Provider verified and updated as needed: Yes   Readmission within the last 30 days: no previous admission in last 30 days      Reason for Consult: discharge planning  Advance Care Planning: Advance Care Planning Reviewed: present on chart          Communication Assessment  Patient's communication style: spoken language (English or Bilingual)    Hearing Difficulty or Deaf: no   Wear Glasses or Blind: yes    Cognitive  Cognitive/Neuro/Behavioral: orientation  Level of Consciousness: alert, intermittent confusion  Arousal Level: opens eyes spontaneously  Orientation: time, situation  Mood/Behavior: calm, cooperative  Best Language: 0 - No aphasia  Speech: clear    Living Environment:   People in home: spouse     Current living Arrangements: house      Able to return to prior arrangements: yes       Family/Social Support:  Care provided by: spouse/significant other  Provides care for: no one, unable/limited ability to care for self  Marital Status:   Wife, Other (specify) ([facility])  Pat       Description of Support System: Supportive, Involved    Support Assessment: Adequate family and caregiver support    Current Resources:   Patient receiving home care services: Yes  Skilled Home Care Services: [facility] ([facility])  Community Resources: Home Care  Equipment currently used at home: none  Supplies currently used at home: Incontinence Supplies    Employment/Financial:  Employment Status:          Financial Concerns:             Lifestyle & Psychosocial Needs:  Social Determinants of Health     Tobacco Use: Medium Risk (4/14/2023)    Patient History      Smoking Tobacco Use: Former      Smokeless Tobacco Use: Never      Passive Exposure: Not on file   Alcohol Use: Not on file   Financial Resource Strain: Not on file   Food  "Insecurity: Not on file   Transportation Needs: Not on file   Physical Activity: Not on file   Stress: Not on file   Social Connections: Not on file   Intimate Partner Violence: Not on file   Depression: Not on file   Housing Stability: Not on file       Functional Status:  Prior to admission patient needed assistance:   Dependent ADLs:: Ambulation-no assistive device, Bathing, Dressing, Grooming, Incontinence, Transfers (uses rails around the home when ambulating)  Dependent IADLs:: Cleaning, Cooking, Laundry, Shopping, Meal Preparation, Medication Management, Money Management, Transportation, Incontinence  Assesssment of Functional Status: Not at  functional baseline    Mental Health Status:          Chemical Dependency Status:                Values/Beliefs:  Spiritual, Cultural Beliefs, Taoist Practices, Values that affect care:                 Additional Information:  Spoke to patient's wife Kristie on the phone to complete initial assessment. Patient lives with Kristie. Per Pat, she is his primary caregiver. She describes some of their daily routine as she cares for him and states that much of his day is now spent napping/ sleeping. Patient has not been eating much, has lost \"so much weight over the past few months. States that he ambulates using furniture and rails for balance.   Pat describes their relationship and states \"I am his nurse. He doesn't know me and I dont know him anymore\" in relation to his dementia. She states that she will keep him home \"as long as I can\".   [facility] for [facility]. Kristie is interested in hospice and would like to meet with hospice next week. After options of hospice agencies were discussed, Kristie would like to start the hospice discussion with [facility]. Referral faxed to [facility]. They will be in contact with Kristie on Monday, 4/17.    Care management will follow for discharge planning assist.    Louann Powers RN        "

## 2023-04-15 NOTE — PLAN OF CARE
Goal Outcome Evaluation:      Plan of Care Reviewed With: patient          Outcome Evaluation: pt has been comfortable & cooperative with plan of care. unsteady gait walking with staff using walker & gait belt. Just had pt up to toilet & only voided a drop when bladder scan done just prior for 88ml. Dr Ramirez updated. Will push fluids

## 2023-04-15 NOTE — ED NOTES
Bladder scan: Over 88mL    Note: bladder scan machine was not accurate for multiple scans so number should be interrogated.

## 2023-04-15 NOTE — PROGRESS NOTES
04/15/23 1100   Appointment Info   Signing Clinician's Name / Credentials (PT) Freda Vera,PT       Present no   Living Environment   People in Home spouse   Current Living Arrangements [place]  [place]   Home Accessibility stairs to enter home;stairs within home   Number of Stairs, Main Entrance 4   Number of Stairs, Within Home, Primary greater than 10 stairs   Stair Railings, Within Home, Primary railings on both sides of stairs   Self-Care   Usual Activity Tolerance good   Equipment Currently Used at Home none   Activity/Exercise/Self-Care Comment per pt I with dressing and bathing, pt answered questions, unsure of reliabilty   General Information   Onset of Illness/Injury or Date of Surgery 04/14/23   Referring Physician Dr. Russell Ramirez   Patient/Family Therapy Goals Statement (PT) return home   Pertinent History of Current Problem (include personal factors and/or comorbidities that impact the POC) admit weakness/SOB   General Observations pt in ER bed, willing to participate with PT   Cognition   Affect/Mental Status (Cognition) unable/difficult to assess   Pain Assessment   Patient Currently in Pain No   Range of Motion (ROM)   ROM Comment BLE WFL   Strength (Manual Muscle Testing)   Strength Comments BLE WFL   Bed Mobility   Assistive Device (Bed Mobility) other (see comments)  (HOB slightly elevated)   Comment, (Bed Mobility) SBA   Transfers   Impairments Contributing to Impaired Transfers impaired balance   Comment, (Transfers) CGA, pt shakey with first stand HHA   Gait/Stairs (Locomotion)   [place] Level (Gait) contact guard   Assistive Device (Gait) walker, front-wheeled   Distance in Feet 20   Distance in Feet (Gait) 100x2   Pattern (Gait) swing-through   Deviations/Abnormal Patterns (Gait) weight shifting decreased   Balance   Balance Comments cues for safety   Clinical Impression   Criteria for Skilled Therapeutic Intervention Yes, treatment indicated    PT Diagnosis (PT) decreased functional mobility   Influenced by the following impairments fatigue, SOB, cognition   Functional limitations due to impairments transfers, gait stairs   Clinical Presentation (PT Evaluation Complexity) Stable/Uncomplicated   Clinical Presentation Rationale presents as medically diagnosed   Clinical Decision Making (Complexity) low complexity   Planned Therapy Interventions (PT) gait training;transfer training;stair training   Anticipated Equipment Needs at Discharge (PT) walker, rolling   Risk & Benefits of therapy have been explained evaluation/treatment results reviewed   PT Total Evaluation Time   PT Eval, Low Complexity Minutes (36665) 13   Physical Therapy Goals   PT Frequency Daily   PT Predicted Duration/Target Date for Goal Attainment 04/19/23   PT Goals Transfers;Gait;Stairs   PT: Transfers Modified independent   PT: Gait Modified independent;Rolling walker;Greater than 200 feet   PT: Stairs 4 stairs  (CGA to enter home)   Interventions   Interventions Quick Adds Gait Training   Gait Training   Gait Training Minutes (22146) 10   Symptoms Noted During/After Treatment (Gait Training) shortness of breath;fatigue   Treatment Detail/Skilled Intervention cues for staying inside FWW with turns and to stand tall. O2 sat after walking 95% on RA, HR 77   [place] Level (Gait Training) contact guard   Physical Assistance Level (Gait Training) 1 person assist   Weight Bearing (Gait Training) full weight-bearing   Assistive Device (Gait Training) rolling walker   Pattern Analysis (Gait Training) swing-through gait   Gait Analysis Deviations decreased esteban   Impairments (Gait Analysis/Training) balance impaired   PT Discharge Planning   PT Plan trasnfers adn gait w/ FWW check O2 sats,4 stairs with rail   PT Discharge Recommendation (DC Rec)   (if pt has A at home then home with A for transfers and gait with home PT and FWW for gait)   PT Rationale for DC Rec unsure pt's living  situation, pt AX1 for transfers and gait with FWW at time of eval, pt pt does not have help at home may need short TCU stay   PT Brief overview of current status pt SBA for bed mob, CGA for transfers and gait w/ FWW   Total Session Time   Timed Code Treatment Minutes 10   Total Session Time (sum of timed and untimed services) 23

## 2023-04-15 NOTE — TREATMENT PLAN
RCAT Treatment Plan    Patient Score: 11  Patient Acuity: 3    Clinical Indication for Therapy: history of bronchospasm, home regimen, productive cough and prevent atelectasis    Therapy Ordered: Atrovent/Xopenex Q6, Flutter PRN, Incentive Spirometry per unit routine.    Assessment Summary: PT currently on room air with an SpO2 of 98%.  Diminished breath sounds, Infrequent, congested productive cough.  Encourage use of IS, Flutter, and coughing up secretions.  RT will re-evaluate RCAT in x72 hours.    Jaspal Bernal, RT  4/15/2023

## 2023-04-15 NOTE — PROGRESS NOTES
RESPIRATORY CARE NOTE:    PT is currently on 1L NC with an SpO2 of 98%.  Breathing pattern shortness of breath Breath sounds diminished Cough type infrequent, dry Sputum Type none  Xopenex/Atrovent nebulizer given x2.  Flutter x2.  Incentive spirometry and coughing up secretions encouraged.  PT tolerated treatments well.  RT will continue to follow.      /56 (BP Location: Right arm, Patient Position: Supine)   Pulse 69   Temp 97.8  F (36.6  C)   Resp 22   Wt 49.9 kg (110 lb)   SpO2 98%   BMI 14.51 kg/m      Jaspal Bernal, RT  4/15/2023

## 2023-04-15 NOTE — PROGRESS NOTES
[facility]    Medicine Progress Note - Hospitalist Service    Date of Admission:  4/14/2023    Assessment & Plan    Patient is a 89-year-old male with history of bronchiectasis, COPD and dementia admitted 4/14/2023 with COPD exacerbation.    #COPD/bronchiectasis exacerbation   Scheduled and PRN brochodilators   Resume PTA Advair   IV solumedrol   Supplemental O2 prn O2 sat <88%    #Generalized weakness/debility  #Dementia   Family open to discussing the possibility of hospice.    PT and OT evaluation in case family decides on placement as opposed to hospice    #Hypertension   BP has been normotensive without PTA meds   Hold Lisinopril for now   Resume metoprolol at lower dose and monitor hemodynamics       DVT ppx: PCDs       Diet: Mechanical/Dental Soft Diet    DVT Prophylaxis:   Dejesus Catheter: Not present  Lines: None     Cardiac Monitoring: None  Code Status: No CPR- Do NOT Intubate      Clinically Significant Risk Factors Present on Admission          # Hypocalcemia: Lowest Ca = 8.2 mg/dL in last 2 days, will monitor and replace as appropriate         # Hypertension: home medication list includes antihypertensive(s)   # Acute Respiratory Failure: Documented O2 saturation < 91%.  Continue supplemental oxygen as needed  # Dementia: noted on problem list            Disposition Plan      Expected Discharge Date: 04/16/2023                  Russell Ramirez DO  Hospitalist Service  [facility]  Securely message with [employer] (more info)  Text page via [employer] Paging/Directory   ______________________________________________________________________    Interval History   No acute events overnight. Pulse ox is reading low but patient denies shortness of breath, has no work of breathing and fingers are a little cold where oximeter sensor is connected.    Physical Exam   Vital Signs: Temp: 97.8  F (36.6  C) Temp src: Oral BP: 114/56 Pulse: 80   Resp: 24 SpO2: 98 % O2 Device:  Nasal cannula Oxygen Delivery: 1 LPM  Weight: 110 lbs 0 oz    General Appearance:  No acute distress  Respiratory: Clear to auscultation bilaterally, no wheezes or rhonchi appreciated  Cardiovascular: Regular rate and rhythm      Medical Decision Making             Data     I have personally reviewed the following data over the past 24 hrs:    6.4  \   11.0 (L)   / 167     142 106 28.8 (H) /  80   4.7 28 1.00 \       ALT: 9 (L) AST: 25 AP: 81 TBILI: 0.5   ALB: 3.5 TOT PROTEIN: 6.2 (L) LIPASE: N/A       Trop: 42 (H) BNP: 1,334       Imaging results reviewed over the past 24 hrs:   Recent Results (from the past 24 hour(s))   Chest XR,  PA & LAT    Narrative    EXAM: XR CHEST 2 VIEWS  LOCATION: [facility]  DATE/TIME: 4/14/2023 12:37 PM CDT    INDICATION: sob  COMPARISON: 03/15/2023 and older studies. CTA chest 01/29/2019      Impression    IMPRESSION: Dual-lead left subclavian venous pacer. Leads are intact and unchanged in position.    Small cluster of calcified granulomas in the right upper lobe peripherally again noted. Lungs are otherwise clear. Heart is of normal size. Descending thoracic aorta is quite ectatic. Central pulmonary vessels are enlarged. No signs of failure or   pneumonia.

## 2023-04-16 NOTE — TREATMENT PLAN
RCAT Treatment Plan    Patient Score: 9  Patient Acuity: 4    Clinical Indication for Therapy: COPD    Therapy Ordered: PRN Xopenex/Atrovent    Assessment Summary: Pt weaned from 2 to 1L on NC, SpO2 94%. Has a strong and productive cough. Breath sounds are diminished throughout, no changes post-treatment. Encouraged deep breathing and coughing techniques.     Janelle Loberg, RT  4/16/2023

## 2023-04-16 NOTE — PLAN OF CARE
Goal Outcome Evaluation:     Pt is alert with confusion. No c/o pain. Sits at side of bed without calling using call button. VS T 97.5F, RR 14/min, MD 92/min, /73, O2 sat 96% 2L per nasal cannula. Wakes up several times during shift and tries to come out of bed. Redirectable. Slept at least 2 hrs during shift.      Problem: Plan of Care - These are the overarching goals to be used throughout the patient stay.    Goal: Optimal Comfort and Wellbeing  Outcome: Progressing     Problem: Risk for Delirium  Goal: Optimal Coping  Outcome: Progressing  Goal: Improved Behavioral Control  Outcome: Not Progressing  Goal: Improved Attention and Thought Clarity  Intervention: Maximize Cognitive Function  Recent Flowsheet Documentation  Taken 4/16/2023 0024 by Lottie Crenshaw RN  Sensory Stimulation Regulation: television on  Reorientation Measures: glasses use encouraged     Problem: Risk for Delirium  Goal: Improved Behavioral Control  Outcome: Not Progressing     Problem: Risk for Delirium  Goal: Improved Attention and Thought Clarity  Intervention: Maximize Cognitive Function  Recent Flowsheet Documentation  Taken 4/16/2023 0024 by Lottie Crenshaw, RN  Sensory Stimulation Regulation: television on  Reorientation Measures: glasses use encouraged     Problem: Urinary Retention  Goal: Effective Urinary Elimination  Outcome: Progressing

## 2023-04-16 NOTE — PROGRESS NOTES
[facility] will try and do an on site visit 4/17 around 1000 to evaluate for hospice eligibility. Wife, Louann's phone was busy on several attempts to inform her of meeting. Hospice notified. They will continue attempts to speak with wife/family. [facility] ph ..

## 2023-04-16 NOTE — PLAN OF CARE
Goal Outcome Evaluation:      Problem: Plan of Care - These are the overarching goals to be used throughout the patient stay.    Goal: Optimal Comfort and Wellbeing  Outcome: Progressing       Patient is pleasant and cooperative.  Alert to self and place.  Easily redirectable.  Denies pain.  Maintaining oxygen sats on 2 L.  No concerns at this time.  Plans to go home on outpatient hospice.

## 2023-04-16 NOTE — PLAN OF CARE
Occupational Therapy Discharge Summary    Reason for therapy discharge:    Pt remains in the hospital; no further skilled OT indicated as pt will continue to need 24/7 supervision & assist w/ADLs.    Progress towards therapy goal(s). See goals on Care Plan in [facility] electronic health record for goal details.  Goals met.    Therapy recommendation(s):    Recommend continued 24/7 supervision/assist with ADLs. Pt demo SBA/min A of 1 with ADLs & trfs.

## 2023-04-16 NOTE — PLAN OF CARE
Problem: Plan of Care - These are the overarching goals to be used throughout the patient stay.    Goal: Absence of Hospital-Acquired Illness or Injury  Intervention: Identify and Manage Fall Risk  Recent Flowsheet Documentation  Taken 4/15/2023 1630 by Tran Guillaume RN  Safety Promotion/Fall Prevention: activity supervised     Problem: Risk for Delirium  Goal: Improved Attention and Thought Clarity  Intervention: Maximize Cognitive Function  Recent Flowsheet Documentation  Taken 4/15/2023 1630 by Tran Guillaume RN  Sensory Stimulation Regulation: television on  Reorientation Measures: glasses use encouraged   Goal Outcome Evaluation:       Patient admitted from the ED, denied any pain or discomfort. On at 2 LPM, oxygen saturation 93-96%. IV saline locked. Ambulated to the bathroom with 1 person standby assist and gait belt.  Attempted to get out of bet multiple times, reoriented and re-assured to return to bed and he did comply. Had 100%  of dinner, good fluid intake.

## 2023-04-16 NOTE — PROGRESS NOTES
"Occupational Therapy     04/16/23 1030   Appointment Info   Signing Clinician's Name / Credentials (OT) Michell Leon OTR/L   Living Environment   People in Home spouse   Current Living Arrangements house   Home Accessibility stairs to enter home;stairs within home   Number of Stairs, Main Entrance 4   Self-Care   Usual Activity Tolerance moderate   Current Activity Tolerance moderate   Equipment Currently Used at Home none   Activity/Exercise/Self-Care Comment Per report, supervision and A of 1 as needed for ADLs.   General Information   Onset of Illness/Injury or Date of Surgery 04/14/23   Referring Physician Russell Ramirez   Patient/Family Therapy Goal Statement (OT) none stated   Existing Precautions/Restrictions fall   Limitations/Impairments safety/cognitive  (impaired cognition @ baseline)   General Observations and Info Per chart:  \"89-year-old male with history of bronchiectasis, COPD and dementia who presents to the emergency department with generalized weakness and shortness of breath.  Patient reports trouble walking due to weakness.\"   Cognitive Status Examination   Orientation Status person   Follows Commands follows one-step commands   Memory Deficit short-term memory;recall, recent events   Attention Deficit requires cues/redirection to task  (easily redirectable back to tasks.)   Cognitive Status Comments impaired @ baseline   Pain Assessment   Patient Currently in Pain No   Posture   Posture not impaired   Range of Motion Comprehensive   General Range of Motion no range of motion deficits identified   Strength Comprehensive (MMT)   General Manual Muscle Testing (MMT) Assessment no strength deficits identified   Coordination   Upper Extremity Coordination No deficits were identified   Bed Mobility   Comment (Bed Mobility) SBA   Transfers   Transfer Comments SBA   Balance   Balance Comments CGA w/FWW; no overt unsteadiness but needing cues for walker use/new for pt per report.   Activities of Daily " Living   BADL Assessment/Intervention lower body dressing;toileting   Lower Body Dressing Assessment/Training   Comment, (Lower Body Dressing) SBA-cues to stay on task   Toileting   Comment, (Toileting) SBA standing @ toilet to urinate; no unsteadiness.   Clinical Impression   Criteria for Skilled Therapeutic Interventions Met (OT) No problems identified which require skilled intervention;No significant expected improvement in functional status  (d/t impaired cognition, pt will continue to need 24/7 supervision & minimal assist w/ADLs.)   OT Diagnosis decreased ADLs   OT Problem List-Impairments impacting ADL cognition;mobility;balance   Assessment of Occupational Performance 1-3 Performance Deficits   Identified Performance Deficits functional mob, impaired cognition   Planned Therapy Interventions (OT) progressive activity/exercise   Clinical Decision Making Complexity (OT) low complexity   Anticipated Equipment Needs Upon Discharge (OT)   (pt could benefit from shower chair to maximize safety for pt-pt unable to state if he uses one.)   Risk & Benefits of therapy have been explained evaluation/treatment results reviewed;participants voiced agreement with care plan;patient   OT Total Evaluation Time   OT Eval, Low Complexity Minutes (06746) 12   OT Goals   Therapy Frequency (OT) One time eval and treatment   OT Predicted Duration/Target Date for Goal Attainment 04/16/23   OT Goals Hygiene/Grooming;Lower Body Dressing;OT Goal 1   OT: Hygiene/Grooming supervision/stand-by assist   OT: Lower Body Dressing Supervision/stand-by assist   OT: Goal 1 Pt to demo @ least 5 minutes standing tolerance SBA w/AD with O2 sats > 90% in prep completion of ADLs.   Interventions   Interventions Quick Adds Therapeutic Activity   Self-Care/Home Management   Self-Care/Home Mgmt/ADL, Compensatory, Meal Prep Minutes (11573) 12   Symptoms Noted During/After Treatment (Meal Preparation/Planning Training) none   Treatment Detail/Skilled  Intervention Pt occasionally needing min A w/trfs-cues for fully aligning to surfaces & use of armrests or grab bars otherwise demo room trfs w/SBA. Pt able to follow directions & cues needed for full completion & thoroughness for basic ADLs. SBA toileting. Pt did miss toilet slightly during standing to urinate-he was able to recognize & able to perform periarea & wipe of top of thigh after given bathing wipe; no unsteadiness during task. SBA washing hands & face @ sink-only needing initial cue to stand closer to sink to maximize safety & cues to locate soap in order to use. SBA w/doff & don socks w/initiated figure four method. Pt alert, pleasant & able to follow simple cues w/occ redirection needed back to tasks.   Therapeutic Activities   Therapeutic Activity Minutes (08392) 9   Symptoms noted during/after treatment none   Treatment Detail/Skilled Intervention Pt states he doesn't use a walker @ home-new learning & frequent reminders needed to use for safety; tends to attempt to walk away from it. SBA/CGA standing tolerance > 5 minutes using walker. Once cued, CGA room mobility & functional mob in hallway ~150 ft for activity tolerance on 2 L O2-maintained O2 96-98%; HR 65. Pt denies feeling SOB during tasks.   OT Discharge Planning   OT Plan no further skilled OT indicated-pt will continue to need 24/7 supervision & demo SBA/min A overall w/ADLs.   OT Discharge Recommendation (DC Rec) home with assist  (pt needing 24/7 supervision & assist of 1 as needed for ADLs)   OT Rationale for DC Rec 24/7 assist for ADLs-supervision needed d/t impaired cognition.   OT Brief overview of current status SBA/min A overall for ADLs, trfs & functional mob. Defer to PT for AD recs.   Total Session Time   Timed Code Treatment Minutes 21   Total Session Time (sum of timed and untimed services) 33      Labs/EKG/Imaging Studies

## 2023-04-16 NOTE — PLAN OF CARE
"  Problem: Plan of Care - These are the overarching goals to be used throughout the patient stay.    Goal: Plan of Care Review  Description: The Plan of Care Review/Shift note should be completed every shift.  The Outcome Evaluation is a brief statement about your assessment that the patient is improving, declining, or no change.  This information will be displayed automatically on your shift note.  Outcome: Progressing  Flowsheets (Taken 4/16/2023 1752)  Plan of Care Reviewed With: patient  Goal: Patient-Specific Goal (Individualized)  Description: You can add care plan individualizations to a care plan. Examples of Individualization might be:  \"Parent requests to be called daily at 9am for status\", \"I have a hard time hearing out of my right ear\", or \"Do not touch me to wake me up as it startles me\".  Outcome: Progressing  Goal: Absence of Hospital-Acquired Illness or Injury  Outcome: Progressing  Intervention: Prevent Skin Injury  Recent Flowsheet Documentation  Taken 4/16/2023 1619 by Jessica Bloom RN  Body Position: position changed independently  Intervention: Prevent and Manage VTE (Venous Thromboembolism) Risk  Recent Flowsheet Documentation  Taken 4/16/2023 1619 by Jessica Bloom RN  VTE Prevention/Management: patient refused intervention  Goal: Optimal Comfort and Wellbeing  Outcome: Progressing  Goal: Readiness for Transition of Care  Outcome: Progressing     Problem: Risk for Delirium  Goal: Optimal Coping  Outcome: Progressing  Goal: Improved Behavioral Control  Outcome: Progressing  Goal: Improved Attention and Thought Clarity  Outcome: Progressing  Goal: Improved Sleep  Outcome: Progressing     Problem: Urinary Retention  Goal: Effective Urinary Elimination  Outcome: Progressing   Goal Outcome Evaluation:      Plan of Care Reviewed With: patient             Pt is alert and oriented x2. Pt is med complaint. Pt denies pain. Pt's v/s is stable. Pt is up on the chair. No complain. Continue to monitor " pt.

## 2023-04-16 NOTE — PROGRESS NOTES
[facility]    Medicine Progress Note - Hospitalist Service    Date of Admission:  4/14/2023    Assessment & Plan    Patient is a 89-year-old male with history of bronchiectasis, COPD and dementia admitted 4/14/2023 with COPD exacerbation.    #COPD/bronchiectasis exacerbation   Scheduled and PRN brochodilators   Advair   PO steroid   Supplemental O2 prn O2 sat <88%    #Generalized weakness/debility  #Dementia   Family has decided on [facility].     #Hypertension   BP has been normotensive without PTA meds   Hold Lisinopril for now   Resume metoprolol at lower dose and monitor hemodynamics       DVT ppx: PCDs         Diet: Mechanical/Dental Soft Diet    DVT Prophylaxis:   Dejesus Catheter: Not present  Lines: None     Cardiac Monitoring: None  Code Status: No CPR- Do NOT Intubate      Clinically Significant Risk Factors          # Hypocalcemia: Lowest Ca = 8.2 mg/dL in last 2 days, will monitor and replace as appropriate                       Disposition Plan     Expected Discharge Date: 04/16/2023      Destination: home with family;home with help/services            Russell Ramirez, DO  Hospitalist Service  [facility]  Securely message with [employer] (more info)  Text page via [email or web address] Paging/Directory   ______________________________________________________________________    Interval History   No acute events overnight.    Physical Exam   Vital Signs: Temp: 97.8  F (36.6  C) Temp src: Oral BP: 134/66 Pulse: 65   Resp: 16 SpO2: 96 % O2 Device: Nasal cannula Oxygen Delivery: 2 LPM  Weight: 110 lbs 0 oz    General Appearance:  No acute distress  Respiratory: Clear to auscultation bilaterally  Cardiovascular: Regular rate and rhythm      Medical Decision Making             Data

## 2023-04-16 NOTE — PROGRESS NOTES
Respiratory Therapy Note    Patient is receiving Q6Hr Xopenex and Atrovent nebs. Patient is on 2L NC, SpO2 94%. Pre-treatment HR 73, RR 20, Breath sounds diminished. Post-treatment HR 72, RR 18 and breath sounds unchanged. Cough is strong and productive. Continue to encourage deep breathing and coughing techniques.     Janelle Loberg, RT

## 2023-04-17 NOTE — DISCHARGE SUMMARY
[facility]  Hospitalist Discharge Summary      Date of Admission:  4/14/2023  Date of Discharge:  4/17/2023  Discharging Provider: Russell Ramirez DO  Discharge Service: Hospitalist Service    Discharge Diagnoses   COPD/bronchiectasis exacerbation with history of endstage COPD  Generalized weakness/debility  Dementia  Hypertension  Cachexia    Follow-ups Needed After Discharge   Follow-up Appointments     Follow-up and recommended labs and tests       Follow up with primary care provider, Paramjit Christian, within 7 days for   hospital follow- up.  No follow up labs or test are needed.             Unresulted Labs Ordered in the Past 30 Days of this Admission     No orders found from 3/15/2023 to 4/15/2023.          Discharge Disposition   Discharged to home with Hospice services  Condition at discharge: Stable      Hospital Course    Patient is a 89-year-old male with history of bronchiectasis, COPD and dementia admitted 4/14/2023 with COPD exacerbation.    #COPD/bronchiectasis exacerbation   Scheduled and PRN brochodilators   Advair   PO steroid for 3 more days   Supplemental O2 prn O2 sat <88%    #Generalized weakness/debility  #Dementia   Wife has decided on [facility] services     #Hypertension   Resume PTA metoprolol and lisinopril    #Cachexia   Normal progression of dementia and COPD   No specific treatment      Consultations This Hospital Stay   PALLIATIVE CARE ADULT IP CONSULT  SOCIAL WORK IP CONSULT  IP RESPIRATORY CARE CHRONIC PULMONARY DISEASE SPECIALIST  INPATIENT HOSPICE ADULT CONSULT  PHYSICAL THERAPY ADULT IP CONSULT  OCCUPATIONAL THERAPY ADULT IP CONSULT  INPATIENT HOSPICE ADULT CONSULT  CARE MANAGEMENT / SOCIAL WORK IP CONSULT  IP RESPIRATORY CARE CHRONIC PULMONARY DISEASE SPECIALIST    Code Status   No CPR- Do NOT Intubate    Time Spent on this Encounter   IRussell DO, personally saw the patient today and spent greater than 30 minutes discharging this patient.        Russell Ramirez, [place] [address] [facility] [address]  Phone: [phone]  Fax: [phone]  ______________________________________________________________________    Physical Exam   Vital Signs: Temp: 97.9  F (36.6  C) Temp src: Oral BP: (!) 142/84 Pulse: 65   Resp: 18 SpO2: 97 % O2 Device: Nasal cannula Oxygen Delivery: 2 LPM  Weight: 110 lbs 0 oz  No acute distress  Heart is regular rate and rhythm  Lungs are clear to auscultation bilaterally       Primary Care Physician   Paramjit Christian    Discharge Orders      Reason for your hospital stay    COPD exacerbation     Follow-up and recommended labs and tests     Follow up with primary care provider, Paramjit Christian, within 7 days for hospital follow- up.  No follow up labs or test are needed.     Activity    Your activity upon discharge: up with assistance     Diet    Follow this diet upon discharge: Orders Placed This Encounter      Mechanical/Dental Soft Diet       Significant Results and Procedures   Most Recent 3 CBC's:Recent Labs   Lab Test 04/14/23  1134 12/20/22  0459 12/17/22  0629 12/14/22  1533   WBC 6.4  --  9.8 14.1*   HGB 11.0*  --  11.3* 12.8*   *  --  101* 100    185 233 283     Most Recent 3 BMP's:Recent Labs   Lab Test 04/15/23  0918 04/14/23  1134 12/20/22  0459 12/15/22  1658 12/15/22  0804    142  --   --  139   POTASSIUM 3.9 4.7  --   --  4.2   CHLORIDE 105 106  --   --  104   CO2 26 28  --   --  24   BUN 33.0* 28.8*  --   --  32.7*   CR 1.00 1.00 0.92  --  1.08   ANIONGAP 11 8  --   --  11   GHAZAL 8.8 8.2  --   --  8.4   * 80  --  96 65*   ,   Results for orders placed or performed during the hospital encounter of 04/14/23   Chest XR,  PA & LAT    Narrative    EXAM: XR CHEST 2 VIEWS  LOCATION: [facility]  DATE/TIME: 4/14/2023 12:37 PM CDT    INDICATION: sob  COMPARISON: 03/15/2023 and older studies. CTA chest 01/29/2019      Impression     IMPRESSION: Dual-lead left subclavian venous pacer. Leads are intact and unchanged in position.    Small cluster of calcified granulomas in the right upper lobe peripherally again noted. Lungs are otherwise clear. Heart is of normal size. Descending thoracic aorta is quite ectatic. Central pulmonary vessels are enlarged. No signs of failure or   pneumonia.       Discharge Medications   Current Discharge Medication List      START taking these medications    Details   docusate sodium (COLACE) 100 MG capsule Take 1 capsule (100 mg) by mouth 2 times daily  Qty: 60 capsule, Refills: 1    Associated Diagnoses: Constipation, unspecified constipation type      guaiFENesin (MUCINEX) 600 MG 12 hr tablet Take 1 tablet (600 mg) by mouth 2 times daily  Qty: 60 tablet, Refills: 1    Associated Diagnoses: COPD exacerbation (H)      predniSONE (DELTASONE) 20 MG tablet Take 1 tablet (20 mg) by mouth daily for 3 days  Qty: 3 tablet, Refills: 0    Associated Diagnoses: COPD exacerbation (H)         CONTINUE these medications which have NOT CHANGED    Details   acetaminophen (TYLENOL) 325 MG tablet Take 2 tablets (650 mg) by mouth every 4 hours as needed for other (mild pain (1-3))  Refills: 0    Associated Diagnoses: Pain      fluticasone-salmeterol (ADVAIR) 250-50 MCG/DOSE inhaler Inhale 1 puff into the lungs 2 times daily      ipratropium - albuterol 0.5 mg/2.5 mg/3 mL (DUONEB) 0.5-2.5 (3) MG/3ML neb solution Take 1 vial by nebulization every 6 hours as needed for shortness of breath, wheezing or cough      lisinopril (PRINIVIL,ZESTRIL) 5 MG tablet [LISINOPRIL (PRINIVIL,ZESTRIL) 5 MG TABLET] Take 1 tablet (5 mg total) by mouth at bedtime.  Qty: 90 tablet, Refills: 3    Associated Diagnoses: Dilated cardiomyopathy (H)      metoprolol succinate ER (TOPROL XL) 25 MG 24 hr tablet Take 1 tablet (25 mg) by mouth daily  Qty: 30 tablet, Refills: 3    Associated Diagnoses: Chronic HFrEF (heart failure with reduced ejection fraction) (H)       sertraline (ZOLOFT) 25 MG tablet Take 25 mg by mouth daily           Allergies   Allergies   Allergen Reactions     Spironolactone Rash     patient broke out in very bad rash on both arms.

## 2023-04-17 NOTE — PROGRESS NOTES
"Care Management Discharge Note    Discharge Date: 04/17/2023      Discharge Disposition: Home    Discharge Services: None    Discharge DME: None    Discharge Transportation: family or friend will provide    Private pay costs discussed: Not applicable    PAS Confirmation Code:  (N/A)    Patient/family educated on Medicare website which has current facility and service quality ratings: yes    Education Provided on the Discharge Plan: Yes    Persons Notified of Discharge Plans: pt's wife Louann    Patient/Family in Agreement with the Plan: yes    Handoff Referral Completed: yes    Additional Information: ANTHONY spoke with Sendy from [facility].  She stated that wife was not sure if wants meeting today at hospital or once pt is discharged home.  Pt walking on stairs and still eating.  Pt not on oxygen.  She is not sure if pt will qualify for hospice.  She will be to hospital at 11 AM to evaluate pt.  Discussed that pt is medically ready to discharge today.      ANTHONY spoke with pt's wife Louann.  She stated that she was getting ready to head over to hospital to take pt home.  She stated that she will take care of pt until she cannot any longer.  She admitted to getting weary herself.  She stated that it is so despairing to see pt like this versus how he once was.  She stated that she will bring pt's slippers and a jacket for him.  She stated that she would be to hospital by around 11:30 today.  She stated that someone named Candy comes and bathes pt twice per week.  She stated that she does not think pt is ready for hospice quite yet.  ANTHONY provided her with Sendy's name and number to contact when she feels pt is ready.  ANTHONY called Sendy and updated her via voicemail regarding conversation with Louann.      ANTHONY spoke with Sendy after she met with pt and wife.  She stated that pt was \"borderline qualified\" for hospice.  Sendy will follow up in a week with them.  Pt does have diagnoses of COPD and heart failure to qualify " him, although ejection fracture and other values are not quite there yet.        GABBY Lynn, LGSW 04/17/23 6:19 PM

## 2023-04-17 NOTE — CONSULTS
COPD Consult Note  4/17/2023, 10:20 AM     Reason for Consult: COPD education  Admission diagnosis: Generalized muscle weakness [M62.81]     History: Billie is a 89 year old with a history of congestive heart failure, dementia, anemia, diallo's esophagus, cardiomyopathy, bronchiectasis, former smoker, and COPD. He has seen Dr. Mclaughlin at the [facility] in the past for pulmonary, has oxygen at home PRN. He was brought in by his wife for changes in his breathing for a few days, she wanted him looked at, he was also have some general weakness. He has been treated with supplemental oxygen, steroid, and bronchodilators.     Assessment:  Patient will not be seen at this time due to his dementia, and moving into home hospice care. We appreciate being consulted for his care and wish him and his family wel..    We will remain available to family and staff throughout his hospital stay and after discharge for any questions or concerns.  If you have questions please call COPD Education at [phone], thank you.    Evelyn Mullins, RT, Chronic Pulmonary Disease Specialist

## 2023-04-17 NOTE — PLAN OF CARE
Problem: Risk for Delirium  Goal: Improved Attention and Thought Clarity  Outcome: Progressing  Intervention: Maximize Cognitive Function  Recent Flowsheet Documentation  Taken 4/17/2023 0045 by Sheila Gaona RN  Sensory Stimulation Regulation: television on     Problem: Plan of Care - These are the overarching goals to be used throughout the patient stay.    Goal: Optimal Comfort and Wellbeing  Outcome: Progressing   Goal Outcome Evaluation:    Patient alert, oriented to self. Denied pain. Pleasant and co-operative with cares.  Needed assist x 2 with cares. Saturation 96 % on 2 LPM. Will continue to monitor the patient.

## 2023-04-17 NOTE — PLAN OF CARE
Goal Outcome Evaluation:  Discharged home with wife at this time. Pt has stable VS-O2 sat 94 on Room air. Denied discomfort. Has been pleasant and cooperative with cares disoriented to time and situation. Ate 100% of meals. Walked to bathroom-voided and had bm. Wife here and discharge orders reviewed with her-discharge prescriptions given to wife.IV removed. Sendy Garcia from Hospice spoke with wife before discharge and will be following up with them at home.

## 2023-04-18 NOTE — PROGRESS NOTES
[facility] [place]    Background: Transitional Care Management program identified per system criteria and reviewed by [facility] team for possible outreach.    Assessment: Upon chart review, CCR Team member will not proceed with patient outreach related to this episode of Transitional Care Management program due to reason below:    Patient has been discharged with Hospice Care    Plan: Transitional Care Management episode addressed appropriately per reason noted above.        Sheila Schulz  Community Health Worker  [facility], [facility]    *Connected Care Resource Team does NOT follow patient ongoing. Referrals are identified based on internal discharge reports and the outreach is to ensure patient has an understanding of their discharge instructions.

## 2023-05-09 NOTE — PHARMACY-ADMISSION MEDICATION HISTORY
Pharmacist Admission Medication History    Admission medication history is complete. The information provided in this note is only as accurate as the sources available at the time of the update.    Medication reconciliation/reorder completed by provider prior to medication history? No    Information Source(s): Patient and CareEverywhere/SureScripts via in-person    Pertinent Information: N/A    Changes made to PTA medication list:    Added: None    Deleted: None    Changed: None    Medication Affordability:  Not including over the counter (OTC) medications, was there a time in the past 3 months when you did not take your medications as prescribed because of cost?: Unable to Assess    Allergies reviewed with patient and updates made in EHR: yes    Medication History Completed By: Norberto Rivas [facility] 5/9/2023 1:55 PM    Prior to Admission medications    Medication Sig Last Dose Taking? Auth Provider Long Term End Date   acetaminophen (TYLENOL) 325 MG tablet Take 2 tablets (650 mg) by mouth every 4 hours as needed for other (mild pain (1-3)) Unknown at prn Yes Jose Bianchi MD     docusate sodium (COLACE) 100 MG capsule Take 1 capsule (100 mg) by mouth 2 times daily 5/9/2023 at AM Yes Russell Ramirez, DO     fluticasone-salmeterol (ADVAIR) 250-50 MCG/DOSE inhaler Inhale 1 puff into the lungs 2 times daily 5/9/2023 at AM, didn't bring Yes Unknown, Entered By History     guaiFENesin (MUCINEX) 600 MG 12 hr tablet Take 1 tablet (600 mg) by mouth 2 times daily 5/9/2023 at AM Yes Russell Ramirez, DO     ipratropium - albuterol 0.5 mg/2.5 mg/3 mL (DUONEB) 0.5-2.5 (3) MG/3ML neb solution Take 1 vial by nebulization every 6 hours as needed for shortness of breath, wheezing or cough Unknown at prn Yes Unknown, Entered By History No    lisinopril (PRINIVIL,ZESTRIL) 5 MG tablet [LISINOPRIL (PRINIVIL,ZESTRIL) 5 MG TABLET] Take 1 tablet (5 mg total) by mouth at bedtime. 5/8/2023 at PM Yes Edgar Baptiste APRN CNP Yes     metoprolol succinate ER (TOPROL XL) 25 MG 24 hr tablet Take 1 tablet (25 mg) by mouth daily 5/9/2023 at AM Yes Jose Bianchi MD Yes    sertraline (ZOLOFT) 25 MG tablet Take 25 mg by mouth daily 5/9/2023 at AM Yes Unknown, Entered By History Yes

## 2023-05-09 NOTE — PLAN OF CARE
Goal Outcome Evaluation:       Pt alert and oriented, but forgetful. Continues to have some shortness of breath. Lung sounds very diminished on right and wheezing heard on left. Currently getting abx. Maintaining SpO2 on 1L O2. Pt unable to obtain sputum sample. Frequent cough, with some coughing episodes during dinner. Pt states that he feels better than when he came in.

## 2023-05-09 NOTE — H&P
[facility]    History and Physical - Hospitalist Service       Date of Admission:  5/9/2023    Assessment & Plan      89 year old male with past medical history of CHF, COPD, dementia, and cardiomyopathy who presents shortness of breath, coughing and wheezing.  Found to have COPD exacerbation    Recurrent COPD cerebration with acute respiratory failure with hypoxia  - Recent hospitalization for similar condition few weeks ago  - Chest x-ray shows benign calcified granuloma in the right upper lobe with hyperinflation and no pneumonia.  - Continue oxygen support  - Continue IV steroid  - Start IV Zithromax empirically  - Check sputum culture  - Continue nebs and inhaler  - Consider palliative care consult    Elevated troponin  - Mild  - Continue to monitor on telemetry  - Serial troponin  - Check echo    Hypertension  - Stable blood pressure  - Restart home medications  - Continue to monitor blood pressure    Mood disorder  - Resume home medication    Dementia  - Stable  - Patient lives with his spouse who is a caregiver  - Monitor for delirium    Weakness and deconditioning  - PT/OT evaluation       Diet: Low Saturated Fat Na <2400 mg    DVT Prophylaxis: Enoxaparin (Lovenox) SQ  Dejesus Catheter: Not present  Lines: None     Cardiac Monitoring: ACTIVE order. Indication: AMI (NSTEMI/ STEMI) (48 hours)  Code Status: No CPR- Do NOT Intubate      Clinically Significant Risk Factors Present on Admission                  # Hypertension: home medication list includes antihypertensive(s)   # Dementia: noted on problem list    # Cachexia: Estimated body mass index is 17.63 kg/m  as calculated from the following:    Height as of this encounter: 1.829 m (6').    Weight as of this encounter: 59 kg (130 lb).           Disposition Plan      Expected Discharge Date: 05/11/2023                  Ra Henley MD  Hospitalist Service  [facility]  Securely message with [employer]  info)  Text page via [facility] Paging/Directory     ______________________________________________________________________    Chief Complaint   Shortness of breath    History is obtained from the patient    History of Present Illness   Billie Garcias is a 89 year old male with past medical history of CHF, COPD, dementia, and cardiomyopathy who presents shortness of breath, coughing and wheezing.  Basically the patient has had 2 days of vomiting and diarrhea. Today the patient and his wife called EMS because the patient was having increasing shortness of breath. He has a known history of COPD and uses nebulizer treatments at home. Today he had no improvement of his breathing with the nebulizer treatments. His wife noticed his breath sounds were very crackly and it seemed to take a lot of effort for him to get a good breath. Upon EMS arrival the patient had an O2 sat of 90%, improved to 93% en route.  In the ER patient is feeling better after neb treatment but still has some shortness of breath.  Of note the patient was hospitalized last month with similar problem.      Past Medical History    Past Medical History:   Diagnosis Date     Chronic HFrEF (heart failure with reduced ejection fraction) (H)      COPD (chronic obstructive pulmonary disease) (H)      LBBB (left bundle branch block)        Past Surgical History   Past Surgical History:   Procedure Laterality Date     EP PACEMAKER DEVICE & LEAD IMPLANT- RIGHT ATRIAL & RIGHT VENTRICULAR N/A 12/16/2022    Procedure: Pacemaker Device & Lead Implant - Right Atrial & Right Ventricular;  Surgeon: Armida Lopez MD;  Location: [facility] LAB CV     ESOPHAGOSCOPY, GASTROSCOPY, DUODENOSCOPY (EGD), COMBINED N/A 2/21/2022    Procedure: ESOPHAGOGASTRODUODENOSCOPY (EGD) WITH BIOPSIES;  Surgeon: Daniel Guzman DO;  Location: [facility] Main OR     HEMORRHOID SURGERY  1951     [place] ESOPHAGOGASTRODUODENOSCOPY TRANSORAL DIAGNOSTIC N/A 10/21/2018    Procedure:  ESOPHAGOGASTRODUODENOSCOPY (EGD) with biopsy;  Surgeon: Masoud Machuca MD;  Location: [facility];  Service: Gastroenterology     SIGMOIDOSCOPY FLEXIBLE N/A 2/21/2022    Procedure: SIGMOIDOSCOPY, FLEXIBLE;  Surgeon: Daniel Guzman DO;  [facility]     TONSILLECTOMY  1943       Prior to Admission Medications   Prior to Admission Medications   Prescriptions Last Dose Informant Patient Reported? Taking?   acetaminophen (TYLENOL) 325 MG tablet Unknown at prn  No Yes   Sig: Take 2 tablets (650 mg) by mouth every 4 hours as needed for other (mild pain (1-3))   docusate sodium (COLACE) 100 MG capsule 5/9/2023 at AM  No Yes   Sig: Take 1 capsule (100 mg) by mouth 2 times daily   fluticasone-salmeterol (ADVAIR) 250-50 MCG/DOSE inhaler 5/9/2023 at AM, didn't bring  Yes Yes   Sig: Inhale 1 puff into the lungs 2 times daily   guaiFENesin (MUCINEX) 600 MG 12 hr tablet 5/9/2023 at AM  No Yes   Sig: Take 1 tablet (600 mg) by mouth 2 times daily   ipratropium - albuterol 0.5 mg/2.5 mg/3 mL (DUONEB) 0.5-2.5 (3) MG/3ML neb solution Unknown at prn  Yes Yes   Sig: Take 1 vial by nebulization every 6 hours as needed for shortness of breath, wheezing or cough   lisinopril (PRINIVIL,ZESTRIL) 5 MG tablet 5/8/2023 at PM  No Yes   Sig: [LISINOPRIL (PRINIVIL,ZESTRIL) 5 MG TABLET] Take 1 tablet (5 mg total) by mouth at bedtime.   metoprolol succinate ER (TOPROL XL) 25 MG 24 hr tablet 5/9/2023 at AM  No Yes   Sig: Take 1 tablet (25 mg) by mouth daily   sertraline (ZOLOFT) 25 MG tablet 5/9/2023 at AM  Yes Yes   Sig: Take 25 mg by mouth daily      Facility-Administered Medications: None        Review of Systems    The 10 point Review of Systems is negative other than noted in the HPI or here.     Social History   I have reviewed this patient's social history and updated it with pertinent information if needed.  Social History     Tobacco Use     Smoking status: Former     Packs/day: 1.00     Years: 16.00     Pack years: 16.00      Types: Cigarettes     Quit date: 1957     Years since quittin.3     Smokeless tobacco: Never   Substance Use Topics     Alcohol use: No     Drug use: No       Family History   I have reviewed this patient's family history and updated it with pertinent information if needed.  Family History   Problem Relation Age of Onset     Cancer Mother      Other - See Comments Father 35.00         in a car accident     Diabetes Type 2  Brother      No Known Problems Brother      Cerebrovascular Disease Sister      No Known Problems Sister      Cerebrovascular Disease Sister      No Known Problems Daughter      No Known Problems Daughter      CABG Brother        Allergies   Allergies   Allergen Reactions     Spironolactone Rash     patient broke out in very bad rash on both arms.         Physical Exam   Vital Signs: Temp: 97.6  F (36.4  C) Temp src: Oral BP: 108/63 Pulse: 113   Resp: 30 SpO2: 94 %      Weight: 130 lbs 0 oz    General Appearance: Sleeping in bed mild respiratory distress  Respiratory: Decreased breath sounds on lung base with poor exchange, scattered rhonchi with expiratory wheeze.  Cardiovascular: Normal heart sound, soft systolic murmur on aortic area, no gallop  GI: Soft, normal bowel sounds, no tenderness  Skin: Dry, warm, no rash.  Neurology: Alert, oriented to place and person only.    Medical Decision Making     75 MINUTES SPENT BY ME on the date of service doing chart review, history, exam, documentation & further activities per the note.      Data     I have personally reviewed the following data over the past 24 hrs:    8.2  \   13.1 (L)   / 195     140 103 31.6 (H) /  87   4.5 25 0.97 \       Trop: 53 (H) BNP: 1,527       Imaging results reviewed over the past 24 hrs:   Recent Results (from the past 24 hour(s))   XR Chest 2 Views    Narrative    EXAM: XR CHEST 2 VIEWS  LOCATION: [facility]  DATE/TIME: 2023 1:17 PM CDT    INDICATION: shortness of breath with  wheezing  COMPARISON: 04/14/2023      Impression    IMPRESSION: Dual-lead left chest cardiac device. Benign calcified granuloma in the right upper lobe. The lungs are hyperinflated but otherwise clear. No effusions or pneumothorax. Heart size is normal. Tortuous ectatic aorta. Degenerative changes of the   spine.

## 2023-05-09 NOTE — PROGRESS NOTES
RESPIRATORY CARE NOTE     Patient Name: Billie Garcias  Today's Date: 5/9/2023       Pt continues to receive atrovent and xopenex. BS are exp wheezes. Pt is on 2lpm of oxygen via NC, SpO2 is 99%. Post treatment there is increased aeration. Pt also perceives improvement.  RT encouraged deep breathing and coughing techniques .  RT will continue to monitor and assess.

## 2023-05-09 NOTE — ED PROVIDER NOTES
EMERGENCY DEPARTMENT ENCOUNTER     NAME: Billie Garcias   AGE: 89 year old male   YOB: 1932   MRN: [id number]   EVALUATION DATE & TIME: No admission date for patient encounter.   PCP: Paramjit Christian     Chief Complaint   Patient presents with     Shortness of Breath   :    FINAL IMPRESSION       1. COPD exacerbation (H)           ED COURSE & MEDICAL DECISION MAKING      11:25 AM I introduced myself to the patient, obtained patient history, performed a physical exam, and discussed plan for ED workup including potential diagnostic laboratory/imaging studies and interventions.  1:58 PM I spoke with the hospitalist, Dr. Gregory. We discussed the patient's case and they agree to admit the patient.    Pertinent Labs & Imaging studies reviewed. (See chart for details)   89 year old male  presents to the Emergency Department for evaluation of shortness of breath.  Patient has a history of COPD and I can see on chart review he has had a couple of visits for this in 2023. Initial Vitals Reviewed. Initial exam notable for patient who is thin, frail elderly appearing male who had pulse ox in the low 90s but who had very diminished breath sounds throughout with tachypnea, prolonged expiratory phase, tight wheezing.  I ordered nebulizer treatments and steroids with concern for COPD exacerbation and his lung exam has improved significantly as has his oxygenation.  I did labs to look for other things like ACS, COVID or influenza and these are all negative.  No signs of ACS with a EKG with bundle branch block and negative troponin.  Ultimately, I think given his age and comorbidities and his appearance on arrival that he would be best served by at least an observation admission to continue bronchodilator therapy and monitoring until the steroids take efficacy.  Case discussed with hospitalist who excepted the patient for admission.           At the conclusion of the encounter I discussed the results of all of  the tests and the disposition. The questions were answered. The patient or family acknowledged understanding and was agreeable with the care plan.         Medical Decision Making    History:    Supplemental history from: Family Member/Significant Other    External Record(s) reviewed: Admission 4/14/23    Work Up:    Chart documentation includes differential considered and any EKGs or imaging independently interpreted by provider, where specified.    In additional to work up documented, I considered the following work up: Documented in chart, if applicable.    External consultation:    Discussion of management with another provider: hospitalist    Complicating factors:    Care impacted by chronic illness: Chronic Lung Disease and Heart Disease    Care affected by social determinants of health: N/A    Disposition considerations: Admit.    MEDICATIONS GIVEN IN THE EMERGENCY:   Medications   albuterol (PROVENTIL) neb solution 2.5 mg (2.5 mg Nebulization $Given 5/9/23 1327)   ipratropium - albuterol 0.5 mg/2.5 mg/3 mL (DUONEB) neb solution 3 mL (3 mLs Nebulization $Given 5/9/23 1139)   methylPREDNISolone sodium succinate (solu-MEDROL) injection 125 mg (125 mg Intravenous $Given 5/9/23 1159)      NEW PRESCRIPTIONS STARTED AT TODAY'S ER VISIT   New Prescriptions    No medications on file     ================================================================   HISTORY OF PRESENT ILLNESS       Patient information was obtained from: Patient , Wife  Use of Intrepreter: N/A  Billie MATHIS Dieter is a 89 year old male with history of CHF, COPD, and cardiomyopathy who presents shortness of breath, vomiting, and diarrhea.     The patient has had 2 days of vomiting and diarrhea. Today the patient and his wife called EMS because the patient was having increasing shortness of breath. He has a known history of COPD and uses nebulizer treatments at home. Today he had no improvement of his breathing with the nebulizer treatments. His wife  noticed his breath sounds were very crackly and it seemed to take a lot of effort for him to get a good breath. Upon EMS arrival the patient had an O2 sat of 90%, improved to 93% en route.    ================================================================    REVIEW OF SYSTEMS       Review of Systems   Respiratory: Positive for shortness of breath.    Gastrointestinal: Positive for diarrhea and vomiting.   All other systems reviewed and are negative.      PAST HISTORY     PAST MEDICAL HISTORY:   Past Medical History:   Diagnosis Date     Chronic HFrEF (heart failure with reduced ejection fraction) (H)      COPD (chronic obstructive pulmonary disease) (H)      LBBB (left bundle branch block)       PAST SURGICAL HISTORY:   Past Surgical History:   Procedure Laterality Date     EP PACEMAKER DEVICE & LEAD IMPLANT- RIGHT ATRIAL & RIGHT VENTRICULAR N/A 12/16/2022    Procedure: Pacemaker Device & Lead Implant - Right Atrial & Right Ventricular;  Surgeon: Armida Lopez MD;  Location: [facility] LAB CV     ESOPHAGOSCOPY, GASTROSCOPY, DUODENOSCOPY (EGD), COMBINED N/A 2/21/2022    Procedure: ESOPHAGOGASTRODUODENOSCOPY (EGD) WITH BIOPSIES;  Surgeon: Daniel Guzman DO;  Location: [facility] [place] OR     HEMORRHOID SURGERY  1951     [place] ESOPHAGOGASTRODUODENOSCOPY TRANSORAL DIAGNOSTIC N/A 10/21/2018    Procedure: ESOPHAGOGASTRODUODENOSCOPY (EGD) with biopsy;  Surgeon: Masoud Machuca MD;  Location: [facility] GI;  Service: Gastroenterology     SIGMOIDOSCOPY FLEXIBLE N/A 2/21/2022    Procedure: SIGMOIDOSCOPY, FLEXIBLE;  Surgeon: Daniel Guzman DO;  Location: [facility] [place] OR     TONSILLECTOMY  1943      CURRENT MEDICATIONS:   acetaminophen (TYLENOL) 325 MG tablet  docusate sodium (COLACE) 100 MG capsule  fluticasone-salmeterol (ADVAIR) 250-50 MCG/DOSE inhaler  guaiFENesin (MUCINEX) 600 MG 12 hr tablet  ipratropium - albuterol 0.5 mg/2.5 mg/3 mL (DUONEB) 0.5-2.5 (3) MG/3ML neb solution  lisinopril (PRINIVIL,ZESTRIL) 5 MG  tablet  metoprolol succinate ER (TOPROL XL) 25 MG 24 hr tablet  sertraline (ZOLOFT) 25 MG tablet      ALLERGIES:   Allergies   Allergen Reactions     Spironolactone Rash     patient broke out in very bad rash on both arms.       FAMILY HISTORY:   Family History   Problem Relation Age of Onset     Cancer Mother      Other - See Comments Father 35.00         in a car accident     Diabetes Type 2  Brother      No Known Problems Brother      Cerebrovascular Disease Sister      No Known Problems Sister      Cerebrovascular Disease Sister      No Known Problems Daughter      No Known Problems Daughter      CABG Brother       SOCIAL HISTORY:   Social History     Socioeconomic History     Marital status:      Number of children: 2   Tobacco Use     Smoking status: Former     Packs/day: 1.00     Years: 16.00     Pack years: 16.00     Types: Cigarettes     Quit date: 1957     Years since quittin.3     Smokeless tobacco: Never   Substance and Sexual Activity     Alcohol use: No     Drug use: No     Sexual activity: Not Currently     Partners: Female   Social History Narrative    He lives with his wife.        VITALS  Patient Vitals for the past 24 hrs:   BP Temp Temp src Pulse Resp SpO2 Height Weight   23 1345 126/63 -- -- 86 18 96 % -- --   23 1300 123/64 -- -- 88 19 93 % -- --   23 1245 125/59 -- -- 78 24 93 % -- --   23 1230 135/63 -- -- 79 24 91 % -- --   23 1215 130/71 -- -- 86 18 94 % -- --   23 1200 134/68 -- -- 72 17 97 % -- --   23 1146 126/77 -- -- 77 21 100 % -- --   23 1139 126/80 -- -- 72 -- 94 % -- --   23 1058 125/77 97.6  F (36.4  C) Oral 76 24 93 % 1.829 m (6') 59 kg (130 lb)        ================================================================    PHYSICAL EXAM     VITAL SIGNS: /63   Pulse 86   Temp 97.6  F (36.4  C) (Oral)   Resp 18   Ht 1.829 m (6')   Wt 59 kg (130 lb)   SpO2 96%   BMI 17.63 kg/m     Constitutional:   Awake, no acute distress   HENT:  Atraumatic, oropharynx without exudate or erythema, membranes moist  Lymph:  No adenopathy  Eyes: EOM intact, PERRL, no injection  Neck: Supple  Respiratory:  Tight breath sounds bilaterally with prolonged expiratory phase and wheezing.  Cardiovascular:  Regular rate and rhythm, single S1 and S2   GI:  Soft, nontender, nondistended, no rebound or guarding   Musculoskeletal:  Moves all extremities, no lower extremity edema, no deformities    Skin:  Warm, dry  Neurologic:  Alert and oriented x3, no focal deficits noted       ================================================================  LAB       All pertinent labs reviewed and interpreted.   Labs Ordered and Resulted from Time of ED Arrival to Time of ED Departure   BASIC METABOLIC PANEL - Abnormal       Result Value    Sodium 140      Potassium 4.5      Chloride 103      Carbon Dioxide (CO2) 25      Anion Gap 12      Urea Nitrogen 31.6 (*)     Creatinine 0.97      Calcium 9.3      Glucose 87      GFR Estimate 74     TROPONIN T, HIGH SENSITIVITY - Abnormal    Troponin T, High Sensitivity 53 (*)    CBC WITH PLATELETS AND DIFFERENTIAL - Abnormal    WBC Count 8.2      RBC Count 4.04 (*)     Hemoglobin 13.1 (*)     Hematocrit 41.1       (*)     MCH 32.4      MCHC 31.9      RDW 12.9      Platelet Count 195      % Neutrophils 66      % Lymphocytes 16      % Monocytes 9      % Eosinophils 8      % Basophils 1      % Immature Granulocytes 0      NRBCs per 100 WBC 0      Absolute Neutrophils 5.4      Absolute Lymphocytes 1.3      Absolute Monocytes 0.7      Absolute Eosinophils 0.7      Absolute Basophils 0.1      Absolute Immature Granulocytes 0.0      Absolute NRBCs 0.0     INFLUENZA A/B, RSV, & SARS-COV2 PCR - Normal    Influenza A PCR Negative      Influenza B PCR Negative      RSV PCR Negative      SARS CoV2 PCR Negative     NT PROBNP INPATIENT - Normal    N terminal Pro BNP Inpatient 1,527           ===============================================================  RADIOLOGY       Reviewed all pertinent imaging. Please see official radiology report.   XR Chest 2 Views   Final Result   IMPRESSION: Dual-lead left chest cardiac device. Benign calcified granuloma in the right upper lobe. The lungs are hyperinflated but otherwise clear. No effusions or pneumothorax. Heart size is normal. Tortuous ectatic aorta. Degenerative changes of the    spine.            ================================================================  EKG       EKG reviewed interpreted by me shows sinus rhythm with left bundle branch block, rate of 79, normal axis, QTc 513 with no acute ST or T wave changes since 14 April    I have independently reviewed and interpreted the EKG(s) documented above.     ================================================================  PROCEDURES         I, Boo Adair, am serving as a scribe to document services personally performed by Dr. Reza based on my observation and the provider's statements to me. I, Stacia Reza MD attest that Boo Adair is acting in a scribe capacity, has observed my performance of the services and has documented them in accordance with my direction.     Stacia Reza M.D.   Emergency Medicine   [facility] EMERGENCY DEPARTMENT  [address] [id number] [phone]  Dept: [phone]      Stacia Reza MD  05/09/23 9917

## 2023-05-09 NOTE — ED TRIAGE NOTES
Patient presents here via [facility], crew # 4, for evaluation of vomiting and diarrhea that has occurred over the past two days. Additionally, he has inspiratory and expiratory wheezes and using abdominal accessory muscle use for breathing pattern. Medics report that his oxygen saturations were 90 % and placed him on oxygen. Currently sats are 93%     Triage Assessment     Row Name 05/09/23 1101       Triage Assessment (Adult)    Additional Documentation Breath Sounds (Group)       Respiratory WDL    Respiratory WDL rhythm/pattern    Rhythm/Pattern, Respiratory --  abdominal breathing       Breath Sounds    Breath Sounds All Fields    All Lung Fields Breath Sounds wheezes, expiratory;wheezes, inspiratory       Skin Circulation/Temperature WDL    Skin Circulation/Temperature WDL WDL       Peripheral/Neurovascular WDL    Peripheral Neurovascular WDL WDL       Cognitive/Neuro/Behavioral WDL    Cognitive/Neuro/Behavioral WDL WDL

## 2023-05-10 PROBLEM — Z71.89 GOALS OF CARE, COUNSELING/DISCUSSION: Status: ACTIVE | Noted: 2023-01-01

## 2023-05-10 PROBLEM — R33.9 URINARY RETENTION: Status: ACTIVE | Noted: 2023-01-01

## 2023-05-10 PROBLEM — J96.01 ACUTE RESPIRATORY FAILURE WITH HYPOXIA (H): Status: ACTIVE | Noted: 2018-10-17

## 2023-05-10 PROBLEM — I10 ESSENTIAL HYPERTENSION: Status: ACTIVE | Noted: 2023-01-01

## 2023-05-10 PROBLEM — I50.20 HEART FAILURE WITH REDUCED EJECTION FRACTION, NYHA CLASS III (H): Chronic | Status: ACTIVE | Noted: 2018-11-07

## 2023-05-10 NOTE — PROGRESS NOTES
Respiratory Care    Pt on 3L NC SpO2 93. Nebs done. LS diminished pre and post. No respiratory distress noted. Will continue to follow.      Baljeet Ugarte, RT

## 2023-05-10 NOTE — CONSULTS
COPD Education Consult  5/10/2023, 11:30 AM  Reason for Consult: COPD education per protocol  Patient Admitted for: COPD exacerbation (H) [J44.1] on 5/9/2023     History of Present Illness: Billie is a 89 year old with a history of heart failure, dementia, cardiomyopathy, anemia, diallo's esophagus, former smoker, bronchiectasis, and COPD. He has seen pulmonary in the past but no longer follows with, and is on room air at baseline. Billie presented to the ED via EMS with vomiting/diarrhea x2 days, increased work of breathing, and I/E wheezing. He has been treated with IV antibiotics, IV steroid, supplemental oxygen, and bronchodilators.    Last PFT:  Date: 11/5/2018  Post-Spirometry:  FVC: 3.08, 92%, +30%  FEV1: 2.04, 83%, +47%  FEV1/FVC: 66%    Home Respiratory Medications:  Bronchodilators:   -Duo Neb PRN  Combination Therapy:   -Advair 1 puff BID    Imaging:  XR CHEST 2 VIEWS  DATE/TIME: 5/9/2023 1:17 PM CDT                                                              IMPRESSION: Dual-lead left chest cardiac device. Benign calcified granuloma in the right upper lobe. The lungs are hyperinflated but otherwise clear. No effusions or pneumothorax. Heart size is normal. Tortuous ectatic aorta. Degenerative changes of the   spine.    Assessment: Patient currently is sitting in bed, on a 3L nasal cannula saturations in the low to mid 90s during our conversation. Patient is able to speak in full sentences with little to no shortness of breath. Talked with bedside RN and RT. Wife was not visiting, will try to see tomorrow or will call to talk with. BP 95/52 (BP Location: Right arm)   Pulse 93   Temp 98.4  F (36.9  C) (Tympanic)   Resp 16   Ht 1.829 m (6')   Wt 59 kg (130 lb)   SpO2 93%   BMI 17.63 kg/m      Education:  -Medication use and instruction done, see progress note for details.  -Talked with him on what he does when shortness of breath, states he likes to have a fan or go outside for air, states his wife  or daughter helps him.   -Reviewed pursed-lip breathing, was able to copy and do once prompted, unable to do if asked.   -Knows to take a nebulizer treatment when having trouble breathing but says his wife or daughter get it ready for him and help him.    Recommendations:  -Continue current inpatient therapy, per [facility] protocols, wean oxygen as tolerates  -Consult(s) to Palliative Care for GOC due to second admission for breathing and within 30 days  -OT/PT evaluation  -Home oxygen evaluation prior to discharge.  -Medications patient is currently taking at home appear to be not ideal, recommend changing Duo Neb PRN to QID, in hospital he is having improvement with nebs and also on is lung function test had good bronchodilator response..    Billie will not participate in our call back program at this time due to his dementia. Will continue to follow patient throughout hospital stay along with educating patient and family.    Total time spend with patient 30 minutes and 60 minutes spent in chart review, care coordination, and documentation.    Evelyn Mullins, RT, Chronic Pulmonary Disease Specialist  Phone [phone]

## 2023-05-10 NOTE — PROGRESS NOTES
"Speech Pathology Clinical Swallow Evaluation       05/10/23 1400   Appointment Info   Signing Clinician's Name / Credentials (SLP) Scott Reich MA [facility] SLP   General Information   Onset of Illness/Injury or Date of Surgery 05/09/23   Referring Physician Nolvia Gonzalez MD   Patient/Family Therapy Goal Statement (SLP) he says he just wants to go home   Pertinent History of Current Problem 89 year old male with past medical history of CHF, COPD, dementia, and cardiomyopathy who presents shortness of breath, coughing and wheezing.  Found to have COPD exacerbation. Today his provider observed him coughing a lot with eating and desaturating into the low 80s. He has history of dysphagia with video swallow study in 2018, recommended chin tuck to reduce risk of aspiration. EGD in 2022 w/Torres's esophagus.   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 I suspect he is referring to his previous swallow study results and recommendations.   Pain Assessment   Patient Currently in Pain No   Type of Evaluation   Type of Evaluation Swallow Evaluation   Oral Motor   Oral Musculature generally intact   Structural Abnormalities none present   Mucosal Quality adequate   Dentition (Oral Motor)   Dentition (Oral Motor) adequate dentition;dental appliance/dentures   Dental Appliance/Denture (Oral Motor) dentures, partial   Facial Symmetry (Oral Motor)   Facial Symmetry (Oral Motor) WNL   Lip Function (Oral Motor)   Comment, Lip Function (Oral Motor) WFL   Tongue Function (Oral Motor)   Comment, Tongue Function (Oral Motor) WFL   Jaw Function (Oral Motor)   Jaw Function (Oral Motor) WNL   Cough/Swallow/Gag Reflex (Oral Motor)   Volitional Swallow (Oral Motor) WNL   Vocal Quality/Secretion Management (Oral Motor)   Vocal Quality (Oral Motor) wet/gurgly   Secretion Management (Oral Motor) WNL "   Comment, Vocal Quality/Secretion Management (Oral Motor) slightly wet vocal quality when I arrived, without PO   General Swallowing Observations   Past History of Dysphagia 2018 w/recommendations for chin tuck   Comment, General Swallowing Observations Pt is able to verbalize and demonstrate his head turn (turn to right and slightly tucked) he says does it whenever he eats or drinks.   Respiratory Support (General Swallowing Observations) nasal cannula  (3 LPM)   Current Diet/Method of Nutritional Intake (General Swallowing Observations, NIS) thin liquids (level 0);regular diet   Swallowing Evaluation Clinical swallow evaluation   Clinical Swallow Evaluation   Feeding Assistance no assistance needed   Clinical Swallow Evaluation Textures Trialed thin liquids;solid foods   Clinical Swallow Eval: Thin Liquid Texture Trial   Mode of Presentation, Thin Liquids straw;self-fed   Volume of Liquid or Food Presented 6 oz   Oral Phase of Swallow WFL   Pharyngeal Phase of Swallow coughing/choking;wet vocal quality after swallow   Successful Strategies Trialed During Procedure chin tuck;other (see comments)  (turn to right w/chin tuck)   Diagnostic Statement he initially used his head turn/chin tuck with each swallow, then forgot and did not use it, immediate and delayed coughing with some wet vocal quality when not using chin tuck.   Clinical Swallow Evaluation: Solid Food Texture Trial   Mode of Presentation self-fed   Volume Presented tisha crackers   Oral Phase WFL   Pharyngeal Phase intact   Diagnostic Statement He had stated he uses the head turn/chin tuck when eating food but did not actually use it when he began eating. He had no overt s/s aspiration while swallowing with head in neutral position.   Esophageal Phase of Swallow   Esophageal comments belching noted after he had finished PO intake, hx Torres's esophagus   Swallowing Recommendations   Diet Consistency Recommendations thin liquids (level 0);regular diet    Postural Recommendations chin tuck;head turn to right   Instrumental Assessment Recommendations VFSS (videofluoroscopic swallowing study)   Comment, Swallowing Recommendations recommend patient continue to use his head turn/chin tuck when taking sips of liquid, needs cues, will schedule video swallow to further assess current function and appropriate strategies.   General Therapy Interventions   Planned Therapy Interventions Dysphagia Treatment   Clinical Impression   Criteria for Skilled Therapeutic Interventions Met (SLP Eval) Yes, treatment indicated   SLP Diagnosis dysphagia   Functional Limitations Related to Problem List (SLP) risk of aspiration   Risks & Benefits of therapy have been explained evaluation/treatment results reviewed;care plan/treatment goals reviewed;risks/benefits reviewed;participants voiced agreement with care plan;participants included;patient   Clinical Impression Comments Pt presents with s/s aspiration with thin liquids when head is in neutral position. He has been instructed to do a chin tuck from previous swallow study in 2018. He actually demonstrates a head turn to the right with slight chin tuck. When using this compensation he had no overt s/s aspiration. However after only a few sips/swallows he forgot to use the strategy and consistently began coughing. No  overt s/s aspiration with regular texture. Recommend he continue with his current strategy, we will complete video swallow study to further assess current swallow function and strategies and provide information for goals of care. He may have difficulty implementing strategies independently given dementia diagnosis and observed forgetfulness during assessment. Palliative consult pending, we will monitor for goals and care.   SLP Total Evaluation Time   Eval: oral/pharyngeal swallow function, clinical swallow Minutes (96208) 20   SLP Goals   Therapy Frequency (SLP Eval) 5 times/wk   SLP Goals Swallow   SLP: Safely tolerate  diet without signs/symptoms of aspiration Regular diet;Thin liquids;With use of compensatory swallow strategies

## 2023-05-10 NOTE — PROGRESS NOTES
"[facility]    Medicine Progress Note - Hospitalist Service    Date of Admission:  5/9/2023    Assessment & Plan                Billie Garcias is a 89 year old male with history of CHF, COPD, dementia, and cardiomyopathy who presents shortness of breath, coughing and wheezing.  Found to have COPD exacerbation. Some concerns about possible aspiration as well. Hospital Day: 2       Recurrent COPD exacerbation with acute respiratory failure with hypoxia  - Recent hospitalization for similar condition few weeks ago  -not on home O2  - Chest x-ray shows benign calcified granuloma in the right upper lobe with hyperinflation and no pneumonia.  - Continue oxygen support  - Continue IV steroid, scale back on IV steroid today as he seems improved  - on IV Zithromax empirically  - sputum culture-specimen still not collected  - Continue nebs and inhaler  - Consider palliative care consult    Coughing with oral intake   -Speech to evaluate.  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  Usually wife sits next to him and coaches him as he eats but she is currently sick and unable to come visit him in the hospital     Elevated troponin  - Mild, downtrending  - discontinue telemetry    Chronic systolic heart failure  - Echo shows LVEF 40 to 45% is unchanged compared to 2019     Hypertension  - Stable blood pressure  - Continue home lisinopril and metoprolol  - Continue to monitor blood pressure     Mood disorder  -Home Zoloft     Dementia  - Stable  - Patient lives with his spouse who is a caregiver  - Monitor for delirium     Weakness and deconditioning  - PT/OT evaluation    Normocytic anemia  -Baseline 11-11.5, similar here  -No s/s bleeding  -Was 13.1 on admit maybe was a little bit " hemoconcentrated    Urine retention  -Straight cathed 2 times in past 24 hours  -continue bladder mgmt protocol    Goals of care  -Wife has been in touch with outside hospice agency, patient is not currently enrolled but they have been calling to check in every week and they were going to sign him on when wife was ready.  She now indicates she may be ready to sign him on.  She probably would not have wanted him to come into the hospital at this time except right now she is sick with a bad URI and not really able to manage caring for him at the moment.  -DNR/DNI  -Wife will likely sign him onto Hospice next time they call to check in       DVT Prophylaxis: Moderate risk. Lovenox  Diet: Low Saturated Fat Na <2400 mg    Dejesus Catheter: Not present  Lines: None     Cardiac Monitoring: ACTIVE order. Indication: AMI (NSTEMI/ STEMI) (48 hours)  Code Status: No CPR- Do NOT Intubate      Clinically Significant Risk Factors                        # Cachexia: Estimated body mass index is 17.63 kg/m  as calculated from the following:    Height as of this encounter: 1.829 m (6').    Weight as of this encounter: 59 kg (130 lb)., PRESENT ON ADMISSION         Disposition Plan   Disposition: Home    Expected Discharge Date: 05/11/2023             Medically ready to discharge today: No     The patient's care was discussed with the Patient and Patient's Family.    Nolvia Gonzalez MD  Hospitalist Service  [facility]  Securely message with [employer] (more info)  Text page via [employer] Paging/Directory   ______________________________________________________________________      Physical Exam   Vital Signs: Temp: 98.7  F (37.1  C) Temp src: Oral BP: 112/68 Pulse: 93   Resp: 22 SpO2: 94 % O2 Device: Nasal cannula Oxygen Delivery: 3 LPM  Weight: 130 lbs 0 oz  General: in no apparent distress, non-toxic and alert cachectic male lying in hospital bed oriented to person and place  HEENT: Head normocephalic atraumatic, oral  mucosa moist. Sclerae anicteric  CV: Regular rhythm, normal rate, no murmurs  Resp: No wheezes, diminished throughout  GI: Belly soft, nondistended, nontender, bowel sounds present  Skin: face and neck flushed  Extremities: No peripheral edema  Psych: Normal affect, mood euthymic  Neuro: CNII-XII grossly intact, moving all 4 extremities        Medical Decision Making               Data   Recent Results (from the past 12 hour(s))   Comprehensive metabolic panel    Collection Time: 05/10/23  5:54 AM   Result Value Ref Range    Sodium 141 136 - 145 mmol/L    Potassium 4.7 3.4 - 5.3 mmol/L    Chloride 105 98 - 107 mmol/L    Carbon Dioxide (CO2) 25 22 - 29 mmol/L    Anion Gap 11 7 - 15 mmol/L    Urea Nitrogen 46.5 (H) 8.0 - 23.0 mg/dL    Creatinine 1.29 (H) 0.67 - 1.17 mg/dL    Calcium 9.1 8.2 - 9.6 mg/dL    Glucose 138 (H) 70 - 99 mg/dL    Alkaline Phosphatase 68 40 - 129 U/L    AST 22 10 - 50 U/L    ALT 10 10 - 50 U/L    Protein Total 6.0 (L) 6.4 - 8.3 g/dL    Albumin 3.7 3.5 - 5.2 g/dL    Bilirubin Total 0.5 <=1.2 mg/dL    GFR Estimate 53 (L) >60 mL/min/1.73m2   Troponin T, High Sensitivity    Collection Time: 05/10/23  5:54 AM   Result Value Ref Range    Troponin T, High Sensitivity 38 (H) <=22 ng/L   CBC with platelets and differential    Collection Time: 05/10/23  5:54 AM   Result Value Ref Range    WBC Count 6.7 4.0 - 11.0 10e3/uL    RBC Count 3.25 (L) 4.40 - 5.90 10e6/uL    Hemoglobin 10.6 (L) 13.3 - 17.7 g/dL    Hematocrit 32.4 (L) 40.0 - 53.0 %     78 - 100 fL    MCH 32.6 26.5 - 33.0 pg    MCHC 32.7 31.5 - 36.5 g/dL    RDW 13.0 10.0 - 15.0 %    Platelet Count 154 150 - 450 10e3/uL    % Neutrophils 86 %    % Lymphocytes 9 %    % Monocytes 4 %    % Eosinophils 0 %    % Basophils 0 %    % Immature Granulocytes 1 %    NRBCs per 100 WBC 0 <1 /100    Absolute Neutrophils 5.8 1.6 - 8.3 10e3/uL    Absolute Lymphocytes 0.6 (L) 0.8 - 5.3 10e3/uL    Absolute Monocytes 0.2 0.0 - 1.3 10e3/uL    Absolute Eosinophils  0.0 0.0 - 0.7 10e3/uL    Absolute Basophils 0.0 0.0 - 0.2 10e3/uL    Absolute Immature Granulocytes 0.1 <=0.4 10e3/uL    Absolute NRBCs 0.0 10e3/uL     Interval History   Patient states he feels better and wants to go home today.  He is currently eating lunch, coughing a lot with eating and desaturating into the low 80s.  Oxygen was turned off I bumped this up to 2 L he improved to 86%.  I asked the nurse to go assess him, may benefit from oxy mask.  I will call and update his family member later today.  Not ready for discharge.  not sure if he may need speech evaluation I will review his chart more.

## 2023-05-10 NOTE — PROGRESS NOTES
05/10/23 1025   Appointment Info   Signing Clinician's Name / Credentials (OT) Vesna Swenson OTR/L   Living Environment   People in Home spouse;child(saima), adult   Current Living Arrangements house   Home Accessibility stairs to enter home;stairs within home   Number of Stairs, Main Entrance 4   Stair Railings, Main Entrance railing on right side (ascending)   Number of Stairs, Within Home, Primary greater than 10 stairs   Stair Railings, Within Home, Primary railings on both sides of stairs   Transportation Anticipated family or friend will provide   Living Environment Comments pt lives with his wife and two adult daughters.  pt has 24 hour care and would like to return there at discharge   Self-Care   Usual Activity Tolerance moderate   Current Activity Tolerance fair   Equipment Currently Used at Home walker, rolling   Activity/Exercise/Self-Care Comment pt states he has a walker but doesn't use consisstently.  showers and dresses independently but gets help with all IADLs.  no [place] drives and has his meds set up for him and money management provided   General Information   Onset of Illness/Injury or Date of Surgery 05/09/23   Referring Physician Ra Henley MD   Patient/Family Therapy Goal Statement (OT) pt would like to return home with family today if possible   Additional Occupational Profile Info/Pertinent History of Current Problem 89 year old male with past medical history of CHF, COPD, dementia, and cardiomyopathy who presents shortness of breath, coughing and wheezing.  Found to have COPD exacerbation   Performance Patterns (Routines, Roles, Habits) pt states he is inactive and doesn't do much these days   Existing Precautions/Restrictions fall   Limitations/Impairments safety/cognitive   General Observations and Info sl unstable withmoblity.  pt states he feels like he is at his baseline   Cognitive Status Examination   Follows Commands WFL   Safety Deficit moderate  deficit;problem-solving;safety precautions awareness;safety precautions follow-through/compliance   Memory Deficit short-term memory;recall, recent events   Attention Deficit requires cues/redirection to task   Cognitive Status Comments impaired at baselline   Strength Comprehensive (MMT)   Comment, General Manual Muscle Testing (MMT) Assessment generalized weakness   Bed Mobility   Bed Mobility supine-sit;sit-supine   Supine-Sit [place] (Bed Mobility) supervision   Comment (Bed Mobility) has an adjustable bed with bed rails at home   Transfers   Transfers bed-chair transfer;toilet transfer   Transfer Skill: Bed to Chair/Chair to Bed   Bed-Chair [place] (Transfers) supervision;verbal cues   Assistive Device (Bed-Chair Transfers) rolling walker   Toilet Transfer   Type (Toilet Transfer) sit-stand;stand-sit   [place] Level (Toilet Transfer) supervision;verbal cues   Assistive Device (Toilet Transfer) grab bars/safety frame   Toilet Transfer Comments reaching for tub and walker to get up instead of grab bars   Balance   Balance Comments SBA with all mobilty.  increase fall risk.  not following walker safety techniques and often setting walker aside   Activities of Daily Living   BADL Assessment/Intervention lower body dressing;grooming   Lower Body Dressing Assessment/Training   Comment, (Lower Body Dressing) with socks sitting eob   [place] Level (Lower Body Dressing) supervision;set up   Grooming Assessment/Training   [place] Level (Grooming) supervision;verbal cues;set up   Clinical Impression   Criteria for Skilled Therapeutic Interventions Met (OT) Yes, treatment indicated   OT Diagnosis decrease functional mobilty and safety   OT Problem List-Impairments impacting ADL cognition;mobility;balance   Assessment of Occupational Performance 1-3 Performance Deficits   Identified Performance Deficits safety with moblity,cognition, endurance   Planned Therapy Interventions (OT) progressive  activity/exercise  (safety)   Clinical Decision Making Complexity (OT) low complexity   Risk & Benefits of therapy have been explained evaluation/treatment results reviewed;participants voiced agreement with care plan;participants included;patient   Clinical Impression Comments pt  receives 24 hour supervision and good support at home.   OT Total Evaluation Time   OT Eval, Low Complexity Minutes (81263) 10   OT Goals   Therapy Frequency (OT) 5 times/wk   OT Predicted Duration/Target Date for Goal Attainment 05/13/23   OT Goals Aerobic Activity;Transfers   OT: Transfer Supervision/stand-by assist;Modified independent;with assistive device  (following safety precautions)   OT: Perform aerobic activity with stable cardiovascular response intermittent activity;10 minutes;ambulation  (standing tolerance, arm exercises)   OT Discharge Planning   OT Plan endurance building tasks, strength, walker safety   OT Discharge Recommendation (DC Rec) home with assist   OT Rationale for DC Rec continued 24/7 assist recommended.  pt might benefit from home care for endurance building and safety as well.   OT Brief overview of current status SBA moblity transfers   Total Session Time   Total Session Time (sum of timed and untimed services) 10

## 2023-05-10 NOTE — PLAN OF CARE
Problem: Plan of Care - These are the overarching goals to be used throughout the patient stay.    Goal: Absence of Hospital-Acquired Illness or Injury  Intervention: Identify and Manage Fall Risk  Recent Flowsheet Documentation  Taken 5/10/2023 0000 by Nupur Goncalves RN  Safety Promotion/Fall Prevention: activity supervised  Intervention: Prevent Skin Injury  Recent Flowsheet Documentation  Taken 5/10/2023 0000 by Nupur Goncalves, RN  Body Position: position changed independently   Goal Outcome Evaluation:       Patient alert, disoriented to time and situation. Oxygen at 2L NC. Lung sounds diminished. Pt agreeable to be cathed. Straight cathed x1 for 700cc's d/t inability to void at 0030

## 2023-05-10 NOTE — PROGRESS NOTES
COPD Inhaler demonstration and instruct  3/16/2023, 3:39 PM    Education:  Medication use and instruction done for advair inhaler.  Patient is able to achieve an adequate inhalation flow of 100 on the in-check device at free flow. Patient was able to demonstrate proper technique, knew when he takes it and tell most of the steps he does for his Advair diskus, needed reminder to rinse mouth out after and spit it out. Written instructions for Advair will be passed on to his wife.    Recommendations:  -Continue current inhaler regimen as patient is able to properly demonstrate use.  -Also would benefit from scheduled Duo Neb throughout day, QID.    Evelyn Mullins, RT, CTTS, Chronic Pulmonary Disease Specialist  Phone [phone]

## 2023-05-10 NOTE — PROGRESS NOTES
Pt continues to receive Xopenex/Atrovent. BS are diminished. there is no change Post treatment.  RT will continue to monitor and assess.      Andrey Glasgow RT

## 2023-05-10 NOTE — PLAN OF CARE
Problem: Plan of Care - These are the overarching goals to be used throughout the patient stay.    Goal: Optimal Comfort and Wellbeing  Outcome: Progressing   Goal Outcome Evaluation:       Patient denies pain or discomfort, writer got report from ER RN that patient has not voided and was scanned for 515 and that they updated MD before he was transferred here, still patient did not void, MD was updated and ordered to straight x1, writer went to patient's room to cath, patient refused, states sometimes he can go 2 days without voiding, MD updated. Patient disoriented to time, bp 99/54, other vitals stable, started on telemetry, remains on 02@2l, sats has been in the mid and upper 90's.

## 2023-05-10 NOTE — PLAN OF CARE
Problem: Urinary Retention  Goal: Effective Urinary Elimination  Outcome: Progressing   Goal Outcome Evaluation:       Patient unable to void and was bladder scanned for >300cc. Straight cath -370 ml.  Good appetite. Fed self. Denies pain. PT/OT  Continue to bladder scan every 4 hours if no void.  Patient has confusion and forgetfulness. Bed alarm on. Respiratory and Speech consult.

## 2023-05-10 NOTE — CONSULTS
Care Management Initial Consult    General Information  Assessment completed with: Patient, Spouse or significant other, Wife Louann  Type of CM/SW Visit: Initial Assessment    Primary Care Provider verified and updated as needed:     Readmission within the last 30 days: previous discharge plan unsuccessful   Return Category: Exacerbation of disease     Advance Care Planning: Advance Care Planning Reviewed: present on chart          Communication Assessment  Patient's communication style: spoken language (English or Bilingual)    Hearing Difficulty or Deaf: no   Wear Glasses or Blind: yes    Cognitive  Cognitive/Neuro/Behavioral: .WDL except, orientation  Level of Consciousness: alert  Arousal Level: opens eyes spontaneously  Orientation: place, time, situation  Mood/Behavior: calm, cooperative     Speech: hesitant, slow    Living Environment:   People in home: spouse  Current living Arrangements: house      Able to return to prior arrangements: yes       Family/Social Support:  Care provided by: spouse/significant other  Provides care for: no one, unable/limited ability to care for self  Marital Status:   Wife          Description of Support System: Supportive    Support Assessment: Adequate family and caregiver support    Current Resources:   Patient receiving home care services: Yes  Skilled Home Care Services: Home Health Aid  Community Resources: Home Care  Equipment currently used at home: walker, rolling  Supplies currently used at home: Incontinence Supplies, Oxygen Tubing/Supplies    Employment/Financial:  Employment Status: , previous service        Financial Concerns:           Lifestyle & Psychosocial Needs:  Social Determinants of Health     Tobacco Use: Medium Risk (4/14/2023)    Patient History      Smoking Tobacco Use: Former      Smokeless Tobacco Use: Never      Passive Exposure: Not on file   Alcohol Use: Not on file   Financial Resource Strain: Not on file   Food Insecurity: Not  on file   Transportation Needs: Not on file   Physical Activity: Not on file   Stress: Not on file   Social Connections: Not on file   Intimate Partner Violence: Not on file   Depression: Not on file   Housing Stability: Not on file       Functional Status:  Prior to admission patient needed assistance:   Dependent ADLs:: Ambulation-no assistive device, Bathing, Dressing, Grooming, Incontinence, Transfers  Dependent IADLs:: Cleaning, Cooking, Laundry, Shopping, Meal Preparation, Medication Management, Money Management, Transportation, Incontinence       Mental Health Status:          Chemical Dependency Status:                Values/Beliefs:  Spiritual, Cultural Beliefs, Hinduism Practices, Values that affect care:                 Additional Information:  RNCM spoke with patient in patient's room and called his wife.  Patient lives at home with wife.  Patient's activity is independent at baseline with walker assistance as needed. Patient's oldest daughter provide support for patient in the home as needed.  Patient is dependent far as medications, meals, shopping, transportation and money management.  Patient uses home oxygen and has a company deliver to the home.  Wife reports patient [facility]  No other services.  RNCM mentioned PT and OT recommending home care PT/OT. Wife received from [facility] about starting hospice service for patient.  Wife wasn't feeling well at this time and asked to call back later to continue assessment.  Wife was recently hospitalized. RNCM provided wife with contact information.     Waleska Solis RN

## 2023-05-10 NOTE — PROVIDER NOTIFICATION
"Notified provider that pt has not yet voided. Pt states that it is normal for him to only void a couple of times a day which goes back to [place]". Bladder scanned for 515. Refused catheter stating he will try again later. Provider said he will come talk to patient  "

## 2023-05-10 NOTE — PROGRESS NOTES
05/10/23 1000   Appointment Info   Signing Clinician's Name / Credentials (PT) Jose Antonio Null, PT   Rehab Comments (PT) Question if pt is poor historian.   Living Environment   People in Home spouse;child(saima), adult   Current Living Arrangements house   Home Accessibility stairs to enter home;stairs within home   Number of Stairs, Main Entrance 4   Stair Railings, Main Entrance railing on right side (ascending)   Number of Stairs, Within Home, Primary greater than 10 stairs   Stair Railings, Within Home, Primary railings on both sides of stairs   Transportation Anticipated family or friend will provide   Living Environment Comments Pt lives in a house with wife and adult daughters, all of whom are able to assist. 4 NIKO, 1 railing, full flight of stairs inside, B railings.   Self-Care   Usual Activity Tolerance moderate   Current Activity Tolerance fair   Regular Exercise   (Pt reports he is active at baseline and walks a lot.)   Equipment Currently Used at Home none  (Uncertain if owns walker?)   Fall history within last six months yes   Number of times patient has fallen within last six months 3   Activity/Exercise/Self-Care Comment Pt used to work at [employer]. [employer] . [place]   General Information   Onset of Illness/Injury or Date of Surgery 05/09/23   Referring Physician Dr. Nolvia Gonzalez.   Patient/Family Therapy Goals Statement (PT) To go home.   Pertinent History of Current Problem (include personal factors and/or comorbidities that impact the POC) From chart: 89 year old male with past medical history of CHF, COPD, dementia, and cardiomyopathy who presents shortness of breath, coughing and wheezing.  Found to have COPD exacerbation.   Existing Precautions/Restrictions fall;oxygen therapy device and L/min  (2L O2 via nc currently.)   Weight-Bearing Status - LLE full weight-bearing   Weight-Bearing Status - RLE full weight-bearing   Heart Disease Risk Factors Medical  history;Gender;Age   General Observations Male supine in bed.   Cognition   Affect/Mental Status (Cognition) WFL   Orientation Status (Cognition) oriented x 4   Follows Commands (Cognition) WFL   Cognitive Status Comments Pleasant, cooperative. Question if slightly confused.   Posture    Posture Forward head position;Protracted shoulders   Range of Motion (ROM)   ROM Comment Appears WFL.   Strength (Manual Muscle Testing)   Strength Comments Possible slight gross impairment.   Bed Mobility   Comment, (Bed Mobility) Supine-sit with SBA.   Transfers   Comment, (Transfers) Sit-stand with no AD, Haydee.   Gait/Stairs (Locomotion)   Distance in Feet (Gait) 200 ft.   Comment, (Gait/Stairs) Ambulated x 10 ft with no AD, CGA. Mildly unsteady at times but no LOBs observed.   Balance   Balance Comments Adequate for ambulation. Would recommend AD at this time.   Sensory Examination   Sensory Perception patient reports no sensory changes   Coordination   Coordination no deficits were identified   Muscle Tone   Muscle Tone no deficits were identified   Clinical Impression   Criteria for Skilled Therapeutic Intervention Yes, treatment indicated   PT Diagnosis (PT) Impaired functional mobility and endurance.   Influenced by the following impairments Medical, impaired endurance.   Functional limitations due to impairments Ambulation, stairs, endurance.   Clinical Presentation (PT Evaluation Complexity) Stable/Uncomplicated   Clinical Presentation Rationale Clinician judgment.   Clinical Decision Making (Complexity) low complexity   Planned Therapy Interventions (PT) gait training;home exercise program;patient/family education;stair training;strengthening;progressive activity/exercise   Anticipated Equipment Needs at Discharge (PT) walker, rolling   Risk & Benefits of therapy have been explained patient   PT Total Evaluation Time   PT Eval, Low Complexity Minutes (01725) 8   Physical Therapy Goals   PT Frequency Daily   PT Predicted  Duration/Target Date for Goal Attainment 05/17/23   PT Goals Transfers;Gait;Stairs   PT: Transfers Modified independent;Sit to/from stand;Assistive device   PT: Gait Modified independent;Assistive device;Greater than 200 feet   PT: Stairs Modified independent;4 stairs;Rail on right   Interventions   Interventions Quick Adds Gait Training;Therapeutic Activity   Therapeutic Activity   Therapeutic Activities: dynamic activities to improve functional performance Minutes (24885) 10   Symptoms Noted During/After Treatment Significant change in vital signs   Treatment Detail/Skilled Intervention PT: Eval completed, treatment initiated. Pt just finishing up RT treatment. Attempted to wean O2. Pt able to sat >90% at rest on RA, but once began to mobilize, quickly dropped into mid-high 80s. Gave 2L O2 via nc and returned to 90s. Demos sit-stand with Haydee, a little shaky initially and using bed for support, but able to stay standing and get his balance. After ambulation, practiced diaphragmatic breathing in sitting with verbal and manual cues to improve oxygenation, pt able to perform correctly when being cued consistently. Demos sit-supine with SBA, alarm on, needs in reach, OT to see.   Gait Training   Gait Training Minutes (28237) 10   Symptoms Noted During/After Treatment (Gait Training) fatigue   Treatment Detail/Skilled Intervention PT: With no AD and CGA, ambulated x 200 ft. On 4L O2 for activity, could not get good readings during ambulation but was ~94% after returning. Occasional mild unsteadiness, hunched posture. No LOBs but I do feel he is at an increased falls risk and would recommend AD. Not ready to try stairs today.   PT Discharge Planning   PT Plan PT: Ambulation with walker, issue walker if discharging and doesn't have at home, monitor O2, stairs.   PT Discharge Recommendation (DC Rec) home with assist;home with home care physical therapy   PT Rationale for DC Rec Pt mobilizing fairly well, reports family is  able to assist and is supportive. From discussion with OT, therapies had recommended home with 24 hour supervision, which I agree with at this time and it sounds like pt has at home. Would recommend HHPT to continnue working on endurance and balance. Would also recommend he uses walker for safety.   PT Brief overview of current status PT eval completed. Pt is SBA for bed mobility, Haydee for sit-stand with no AD, ambulated ~200 ft with no AD, CGA. Mildly unsteady, would recommend walker. Does desat with activity.   Total Session Time   Timed Code Treatment Minutes 20   Total Session Time (sum of timed and untimed services) 28

## 2023-05-11 NOTE — PROGRESS NOTES
Speech-Language Pathology: Video Swallow Study     05/11/23 1400   Appointment Info   Signing Clinician's Name / Credentials (SLP) Liya Etienne MA, CCC-SLP   General Information   Onset of Illness/Injury or Date of Surgery 05/09/23   Referring Physician Nolvia Gonzalez MD   Pertinent History of Current Problem 89 year old male with past medical history of CHF, COPD, dementia, and cardiomyopathy who presents shortness of breath, coughing and wheezing.  Found to have COPD exacerbation. Today his provider observed him coughing a lot with eating and desaturating into the low 80s. He has history of dysphagia with video swallow study in 2018, recommended chin tuck to reduce risk of aspiration. EGD in 2022 w/Torres's esophagus.   Type of Evaluation   Type of Evaluation Swallow Evaluation   General Swallowing Observations   Past History of Dysphagia 2018 w/recommendations for chin tuck. Pt had CSE on 5/10 and pt had coughing without use of swallow strategies.   Comment, General Swallowing Observations Pt is able to verbalize and demonstrate his head turn (turn to right and slightly tucked) he says does it whenever he eats or drinks.   Current Diet/Method of Nutritional Intake (General Swallowing Observations, NIS) thin liquids (level 0);regular diet   Swallowing Evaluation Videofluoroscopic swallow study (VFSS)   VFSS Evaluation   Radiologist Dr. Travis   Views Taken left lateral   Physical Location of Procedure [facility] radiology   VFSS Textures Trialed thin liquids;mildly thick liquids;solid foods   VFSS Eval: Thin Liquid Texture Trial   Mode of Presentation, Thin Liquid cup;self-fed   Order of Presentation 1,2,6   Preparatory Phase WFL   Oral Phase, Thin Liquid premature pharyngeal entry   Bolus Location When Swallow Triggered valleculae;pyriforms  (Valleculae with small, spoon size sip)   Pharyngeal Phase, Thin Liquid impaired epiglottic movement;impaired tongue base retraction;residue in vallecula;residue in  pyriform sinus   Rosenbek's Penetration Aspiration Scale: Thin Liquid Trial Results 2 - contrast enters airway, remains above the vocal cords, no residue remains (penetration)   Strategies and Compensations chin tuck;head turn to the right  (Patient has natural double or triple swallow)   Diagnostic Statement Pt had intermittent trace transient penetration with large consecutive sips of thin. He used strategies of chin tuck+head turn right and multiple swallows throughout evaluation. Did not use small single sip strategy despite training.   VFSS Eval: Mildly Thick Liquids   Order of Presentation 3   Preparatory Phase WFL   Oral Phase premature pharyngeal entry   Bolus Location When Swallow Triggered pyriforms   Pharyngeal Phase impaired tongue base retraction;residue in vallecula;residue in pyriform sinus   Rosenbek's Penetration Aspiration Scale 1 - no aspiration, contrast does not enter airway   Strategies and Compensations chin tuck;head turn to the right  (Patient has natural double or triple swallow)   Diagnostic Statement Very similar presentation to thin liquids.   VFSS Evaluation: Solid Food Texture Trial   Mode of Presentation, Solid spoon   Order of Presentation 4,5   Preparatory Phase WFL   Oral Phase, Solid premature pharyngeal entry   Bolus Location When Swallow Triggered valleculae   Pharyngeal Phase, Solid impaired tongue base retraction;residue in vallecula   Rosenbek's Penetration Aspiration Scale: Solid Food Trial Results 1 - no aspiration, contrast does not enter airway   Strategies and Compensations   (Patient has natural double swallow)   Diagnostic Statement Stasis clears with additional swallow.   Swallowing Recommendations   Diet Consistency Recommendations thin liquids (level 0);regular diet   Supervision Level for Intake 1:1 supervision needed   Postural Recommendations chin tuck;head turn to right   Monitoring/Assistance Required (Eating/Swallowing) stop eating activities when fatigue is  present   Recommended Feeding/Eating Techniques (Swallow Eval) maintain upright sitting position for eating   Medication Administration Recommendations, Swallowing (SLP) One at a time   Comment, Swallowing Recommendations Patient is at low aspiration risk when using swallow strategies. He will need direct supervision with meals to ensure he uses them.   General Therapy Interventions   Planned Therapy Interventions Dysphagia Treatment   Clinical Impression   Criteria for Skilled Therapeutic Interventions Met (SLP Eval) Yes, treatment indicated   SLP Diagnosis dysphagia   Risks & Benefits of therapy have been explained evaluation/treatment results reviewed;care plan/treatment goals reviewed;risks/benefits reviewed;participants voiced agreement with care plan;participants included;patient   Clinical Impression Comments Videofluoroscopic Swallow Study completed. Patient had no aspiration.  Intermittent shallow transient penetration with thin, specifically during large consecutive sips. No penetration with small sips controlled by SLP. Oral phase is WFL. Tongue base retraction was reduced. Swallow response was delayed. Epiglottic inversion was posterior-inferior to complete.  Significant stasis occurred with thin and mildly thick liquids at the valleculae and pyriform sinuses. This cleared with natural subsequent swallows. Hyolaryngeal elevation was WFL and hyolaryngeal excursion was reduced.  Mastication slow but functional.   SLP Total Evaluation Time   Evaluation, videofluoroscopic eval of swallow function Minutes (87439) 10   SLP Discharge Planning   SLP Plan 5; diet f/u review strategies, if okay d/c   SLP Rationale for DC Rec per care team   SLP Brief overview of current status  Recommend continue regular textures and thin liquids. 1:1 supervision to cue use of chin tuck/head turn to right when taking sips of liquids.

## 2023-05-11 NOTE — PLAN OF CARE
Problem: Plan of Care - These are the overarching goals to be used throughout the patient stay.    Goal: Optimal Comfort and Wellbeing  Outcome: Progressing     Problem: Urinary Retention  Goal: Effective Urinary Elimination  Outcome: Progressing   Goal Outcome Evaluation:         Patient denies pain or discomfort, patient did not void this shift, was scan for 52 and 256, did not meet parameters for straight cath, remain on 02@3l, sats has been in the mid 90's, no acute respiratory distress, vitals stable, alert and disoriented to time and situation.

## 2023-05-11 NOTE — PROGRESS NOTES
COPD Follow up Note  5/11/2023, 2:30 PM     Admission diagnosis: COPD exacerbation (H) [J44.1]     Follow up:  In review of chart appears Billie will be most likely enrolled into hospice care. Will not call his wife to offer education at this time due to his probably of moving into hospice. Will continue to monitor, remain available to wife, family, and staff regarding questions/concerns about his COPD or inhaled medications.    If you have questions please call COPD Education at [phone], thank you.    Evelyn Mullins, RT, CTTS, Chronic Pulmonary Disease Specialist

## 2023-05-11 NOTE — TREATMENT PLAN
RCAT Treatment Plan    Patient Score: 4  Patient Acuity: 5    Clinical Indication for Therapy: history of bronchospasm    Therapy Ordered: Xopenex and Atrovent Q6 PRN    Assessment Summary: pt on room air SpO2 94, diminished LS this morning. Infrequent cough, no shortness of breath RR 16-18. Will follow as needed.      Baljeet Ugarte, RT  5/11/2023

## 2023-05-11 NOTE — PLAN OF CARE
"  Problem: Plan of Care - These are the overarching goals to be used throughout the patient stay.    Goal: Plan of Care Review  Description: The Plan of Care Review/Shift note should be completed every shift.  The Outcome Evaluation is a brief statement about your assessment that the patient is improving, declining, or no change.  This information will be displayed automatically on your shift note.  Outcome: Progressing  Flowsheets (Taken 5/11/2023 1047)  Plan of Care Reviewed With: patient  Goal: Patient-Specific Goal (Individualized)  Description: You can add care plan individualizations to a care plan. Examples of Individualization might be:  \"Parent requests to be called daily at 9am for status\", \"I have a hard time hearing out of my right ear\", or \"Do not touch me to wake me up as it startles me\".  Outcome: Progressing  Goal: Absence of Hospital-Acquired Illness or Injury  Outcome: Progressing  Intervention: Identify and Manage Fall Risk  Recent Flowsheet Documentation  Taken 5/11/2023 0934 by Jessica Bloom RN  Safety Promotion/Fall Prevention:    activity supervised    assistive device/personal items within reach    clutter free environment maintained    increased rounding and observation    nonskid shoes/slippers when out of bed    patient and family education  Intervention: Prevent Skin Injury  Recent Flowsheet Documentation  Taken 5/11/2023 0934 by Jessica Bloom RN  Body Position: position maintained  Goal: Optimal Comfort and Wellbeing  Outcome: Progressing  Goal: Readiness for Transition of Care  Outcome: Progressing     Problem: Risk for Delirium  Goal: Optimal Coping  Outcome: Progressing  Goal: Improved Behavioral Control  Outcome: Progressing  Intervention: Minimize Safety Risk  Recent Flowsheet Documentation  Taken 5/11/2023 0934 by Jessica Bloom RN  Enhanced Safety Measures: room near unit station  Goal: Improved Attention and Thought Clarity  Outcome: Progressing  Goal: Improved " Sleep  Outcome: Progressing     Problem: Urinary Retention  Goal: Effective Urinary Elimination  Outcome: Progressing   Goal Outcome Evaluation:      Plan of Care Reviewed With: patient             Pt is alert and oriented x3. Pt is forgetful. Pt's v/s is stable. Pt denies pain. Pt is up with assist of 1.pt was st cath for 700ml this morning.  No complain. Continue to monitor pt.

## 2023-05-11 NOTE — PROGRESS NOTES
[facility]    Medicine Progress Note - Hospitalist Service    Date of Admission:  5/9/2023    Assessment & Plan   89 year old male with history of CHF, COPD, dementia, and cardiomyopathy presents with COPD exacerbation.        Recurrent COPD exacerbation with acute respiratory failure with hypoxia:  Recent hospitalization for similar condition few weeks ago  Not on home O2  Chest x-ray shows benign calcified granuloma in the right upper lobe with hyperinflation and no pneumonia.  - Continue oxygen support  - Change to prednisone  - change to oral Zithromax    - sputum culture-specimen still not collected  - Continue nebs and inhaler  - Family is requesting hospice consult       Concern for aspiraiton  - speech therapy recommends modified diet and VSS       Elevated troponin  - Mild, trended down on rechecks  - discontinued telemetry       Chronic systolic heart failure  Echo shows LVEF 40 to 45% is unchanged compared to 2019  - continue metoprolol, lisinopril       Mood disorder  -Home Zoloft       Dementia  - Stable  - Patient lives with his spouse who is a caregiver       Weakness and deconditioning  - PT/OT        Urine retention  -Straight cathed 2 times in past 24 hours  -continue bladder mgmt protocol       Goals of care:  -Spouse is asking for hospice consultation which was placed 5/11/2020  -DNR/DNI  -Plan home likely with home hospice             Diet: Low Saturated Fat Na <2400 mg    DVT Prophylaxis: Enoxaparin (Lovenox) SQ  Dejesus Catheter: Not present  Lines: None     Cardiac Monitoring: None  Code Status: No CPR- Do NOT Intubate      Clinically Significant Risk Factors                        # Cachexia: Estimated body mass index is 17.63 kg/m  as calculated from the following:    Height as of this encounter: 1.829 m (6').    Weight as of this encounter: 59 kg (130 lb)., PRESENT ON ADMISSION         Disposition Plan      Expected Discharge Date: 05/12/2023      Destination: home  with family  Discharge Comments: palliative ?          Adrian Bush, DO  Hospitalist Service  [facility]  Securely message with [employer] (more info)  Text page via [employer] Paging/Directory   ______________________________________________________________________    Interval History   NAD. Denies any complaints    Physical Exam   Vital Signs: Temp: 97.7  F (36.5  C) Temp src: Oral BP: 132/67 Pulse: 66   Resp: 16 SpO2: 94 % O2 Device: Nasal cannula Oxygen Delivery: 3 LPM  Weight: 130 lbs 0 oz  General: NAD  RESPIRATORY: Breathing nonlabored  CARDIOVASCULAR: No le edema bilat.   ABDOMEN: soft and non-tender  NEUROLOGIC: Motor and sensory intact, speech clear         Medical Decision Making       >35 MINUTES SPENT BY ME on the date of service doing chart review, history, exam, documentation & further activities per the note.      Data

## 2023-05-11 NOTE — PROGRESS NOTES
Care Management Follow Up    Length of Stay (days): 2    Expected Discharge Date: 05/12/2023     Concerns to be Addressed: discharge planning     Patient plan of care discussed at interdisciplinary rounds: Yes    Anticipated Discharge Disposition:  Home with hospice, palliative waiting goals of care.     Anticipated Discharge Services:  TBD  Anticipated Discharge DME:  TBD    Patient/family educated on Medicare website which has current facility and service quality ratings:  NA  Education Provided on the Discharge Plan:  yes  Patient/Family in Agreement with the Plan:  yes    Referrals Placed by CM/SW:  yes  Private pay costs discussed: NA    Additional Information:  Patient's wife called RNCM.  She asked about hospice.  RNCM left MD message to place palliative consult.  SLP recommenced 1:1 when eating due to dysphagia and needing cues to chin huck.head turn.     Per MD note yesterday:   Goals of care  -Wife has been in touch with outside hospice agency, patient is not currently enrolled but they have been calling to check in every week and they were going to sign him on when wife was ready.  She now indicates she may be ready to sign him on.  She probably would not have wanted him to come into the hospital at this time except right now she is sick with a bad URI and not really able to manage caring for him at the moment.  -DNR/DNI  -Wife will likely sign him onto Hospice next time they call to check in     CM will continue to follow goals of care and aide in discharge planning.  CM will follow up with wife to inform her of Palliative consult once placed.     11:25 AM - RNCM called patient's wife.  Wife is interested in home hospice, does not have a specific agency she wants to use.  RNCM let wife know that we will place referrals and update her on accepting agencies.  Referral sent to [facility].     3:17 PM - Recieved voicemail from [facility].  Sheyla provided Johnna's number as she will be onsite,  [phone].  RNCM left voicemail for Johnna requesting call back.     3:57 PM - Johnna left voicemail, RNCM called her back.  Johnna will review the chart tonight and will call RNCM back to tomorrow morning to discuss goals of care.     Waleska Solis RN

## 2023-05-12 NOTE — PLAN OF CARE
Problem: Plan of Care - These are the overarching goals to be used throughout the patient stay.    Goal: Optimal Comfort and Wellbeing  Outcome: Progressing  Goal: Readiness for Transition of Care  Outcome: Progressing     Problem: Risk for Delirium  Goal: Optimal Coping  Outcome: Progressing  Goal: Improved Behavioral Control  Outcome: Progressing  Intervention: Prevent and Manage Agitation  Recent Flowsheet Documentation  Taken 5/12/2023 1600 by Wang Jefferson RN  Environment Familiarity/Consistency: daily routine followed  Intervention: Minimize Safety Risk  Recent Flowsheet Documentation  Taken 5/12/2023 1600 by Wang Jefferson RN  Enhanced Safety Measures: room near unit station  Goal: Improved Attention and Thought Clarity  Outcome: Progressing  Intervention: Maximize Cognitive Function  Recent Flowsheet Documentation  Taken 5/12/2023 1600 by Wang Jefferson RN  Sensory Stimulation Regulation: care clustered  Reorientation Measures: clock in view   Goal Outcome Evaluation:      Staff continues to monitor and assess respiratory status    Wang Jefferson RN

## 2023-05-12 NOTE — PLAN OF CARE
Problem: Plan of Care - These are the overarching goals to be used throughout the patient stay.    Goal: Plan of Care Review  Description: The Plan of Care Review/Shift note should be completed every shift.  The Outcome Evaluation is a brief statement about your assessment that the patient is improving, declining, or no change.  This information will be displayed automatically on your shift note.  Outcome: Progressing     Problem: Plan of Care - These are the overarching goals to be used throughout the patient stay.    Goal: Optimal Comfort and Wellbeing  Outcome: Progressing     Problem: Risk for Delirium  Goal: Improved Attention and Thought Clarity  Intervention: Maximize Cognitive Function  Recent Flowsheet Documentation  Taken 5/12/2023 1000 by Angelina Obrien RN  Reorientation Measures: clock in view   Goal Outcome Evaluation:  Patient has been up to chair today with PT/OT sessions. Fed self with staff set up. Good appetite. Bladder scan every 4 hours and straight cath for >300cc. 1430 last straight cath was 700cc. Flomax started  Alert and confused, but follows directions. Denies pain.

## 2023-05-12 NOTE — PROGRESS NOTES
[facility]    Medicine Progress Note - Hospitalist Service    Date of Admission:  5/9/2023    Assessment & Plan   89 year old male with history of CHF, COPD, dementia, and cardiomyopathy presents with COPD exacerbation.        Recurrent COPD exacerbation with acute respiratory failure with hypoxia:  Recent hospitalization for similar condition few weeks ago  Not on home O2  Chest x-ray shows benign calcified granuloma in the right upper lobe with hyperinflation and no pneumonia.  - Continue oxygen support  -Continue prednisone  -Continue oral Zithromax    - Continue nebs and inhaler  - Family is planning home with hospice in the a.m.       Concern for aspiraiton  - speech therapy recommends modified diet and VSS      Urinary retention:  -- Start Flomax, straight cath if needed, Place Dejesus in the a.m. if needed       Elevated troponin  - Mild, trended down on rechecks  - discontinued telemetry       Chronic systolic heart failure  Echo shows LVEF 40 to 45% is unchanged compared to 2019  - continue metoprolol, lisinopril       Mood disorder  -Home Zoloft       Dementia  - Stable  - Patient lives with his spouse who is a caregiver       Weakness and deconditioning  - PT/OT          Goals of care:  -Home with hospice in the a.m.  -DNR/DNI             Diet: Combination Diet Regular Diet; Thin Liquids (level 0)    DVT Prophylaxis: Enoxaparin (Lovenox) SQ  Dejesus Catheter: Not present  Lines: None     Cardiac Monitoring: None  Code Status: No CPR- Do NOT Intubate      Clinically Significant Risk Factors                        # Cachexia: Estimated body mass index is 17.63 kg/m  as calculated from the following:    Height as of this encounter: 1.829 m (6').    Weight as of this encounter: 59 kg (130 lb)., PRESENT ON ADMISSION         Disposition Plan      Expected Discharge Date: 05/13/2023    Discharge Delays: *Early Discharge Anticipated  Destination: home with family  Discharge Comments: [facility]  [place] by wheelchair  between 4613-3711 on 5/13, need O2 3L.  To home with [facility].          Adrian Bush, DO  Hospitalist Service  [facility]  Securely message with [employer] (more info)  Text page via [employer] Paging/Directory   ______________________________________________________________________    Interval History   NAD. Denies any complaints    Physical Exam   Vital Signs: Temp: 97.9  F (36.6  C) Temp src: Oral BP: 112/69 Pulse: 78   Resp: 16 SpO2: 98 % O2 Device: Nasal cannula Oxygen Delivery: 3 LPM  Weight: 130 lbs 0 oz  General: NAD  RESPIRATORY: Breathing nonlabored  CARDIOVASCULAR: No le edema bilat.   ABDOMEN: soft and non-tender  NEUROLOGIC: Motor and sensory intact, speech clear         Medical Decision Making       >35 MINUTES SPENT BY ME on the date of service doing chart review, history, exam, documentation & further activities per the note.      Data

## 2023-05-12 NOTE — PROGRESS NOTES
Care Management Follow Up    Length of Stay (days): 3    Expected Discharge Date: 05/13/2023     Concerns to be Addressed: discharge planning     Patient plan of care discussed at interdisciplinary rounds: Yes    Anticipated Discharge Disposition:  Home with hospice, [facility]     Anticipated Discharge Services:  TBD  Anticipated Discharge DME:  TBD    Patient/family educated on Medicare website which has current facility and service quality ratings:  NA  Education Provided on the Discharge Plan:  yes  Patient/Family in Agreement with the Plan:  yes    Referrals Placed by CM/SW:  yes  Private pay costs discussed: transportation cost    Additional Information:  RNCM placed call to Johnna at [facility].  Castle will be here at [facility] this morning, will see patient and connect with wife. [facility] - [phone]     Chart reviewed.  Per RN note, needs straight cathing.  Patient is forgetful.  Assit of 1.  SLP notes pt needs supervision when eating.     11:31 AM - Care progression/ discharging planning discussed with MD.  Noted multiple straight caths, MD will start Flomax, consider vaz if needed/ patient's approval.  Hospice to assess patient today. Will need oxygen at home, has been on 3L here.     12:52 PM - Castle from [facility] called with update.  [facility] will be delivering a walker and oxygen to patient home this evening.    1:15 PM - RNCM talked with wife on the phone to discuss transportation needs and cost, wheelchair with oxygen.  Wife consents to transport.   1:30 PM - RNCM set up transport to home with [facility] by wheelchair for tomorrow for a window between 3613-4123, need O2 at 3L.    1: 50 PM - RNCM notified MD of plan.  3:01 PM - RNCM notified Castle with [facility] of plan.  Home RN will be at the patient's home at 12:00-12:30 tomorrow for a teach.    Per MD note 5/10:   Goals of care  -Wife has been in touch with outside hospice agency, patient is not currently enrolled but  they have been calling to check in every week and they were going to sign him on when wife was ready.  She now indicates she may be ready to sign him on.  She probably would not have wanted him to come into the hospital at this time except right now she is sick with a bad URI and not really able to manage caring for him at the moment.  -DNR/DNI  -Wife will likely sign him onto Hospice next time they call to check in     CM will continue to follow goals of care and aide in discharge planning.        Waleska Solis RN

## 2023-05-12 NOTE — PROGRESS NOTES
RESPIRATORY CARE NOTE    Patient is on 3 LPM NC. Patient continue to receive schedule nebulizer. BS diminished with increased aeration post neb. Patient tolerated treatment well.      Osiel Heredia, RT

## 2023-05-12 NOTE — PLAN OF CARE
Problem: Urinary Retention  Goal: Effective Urinary Elimination  Outcome: Not Progressing     Problem: Plan of Care - These are the overarching goals to be used throughout the patient stay.    Goal: Optimal Comfort and Wellbeing  Outcome: Progressing   Goal Outcome Evaluation:       Patient did not void the whole shift, was scan for 312, was cath later for 550, denies pain or discomfort, was up in chair most part of the shift, remains on 02@3l, sats has been in the mid and upper 90's, no respiratory distress, alert, disoriented to time and situation.

## 2023-05-12 NOTE — PLAN OF CARE
Goal Outcome Evaluation:      Plan of Care Reviewed With: patient    Denies pain. Slept well overnight. Cares clustered to promote rest.  Disoriented to place, time, and situation.  Received two PRN duonebs due to cough, intermittent shortness of breath, and respiratory congestion. Helpful. Remains on 3L nasal cannula.  Urinated a small amount (incontinent) - bladder scan revealed 228. Had a small BM.    Reviewed plan of care.   Mercedes Burns, RN  [id number]

## 2023-05-13 NOTE — DISCHARGE SUMMARY
Care Management Discharge Note    Discharge Date: 05/13/2023     Discharge Disposition:  Home with hospice    Discharge Services:  [facility]    Discharge DME:  Hospice to provide any needed DME    Discharge Transportation: [facility] transport between 0939 and 1024    Private pay costs discussed: Not applicable    Does the patient's insurance plan have a 3 day qualifying hospital stay waiver?  No    PAS Confirmation Code:  N/A  Patient/family educated on Medicare website which has current facility and service quality ratings:  N/A    Education Provided on the Discharge Plan:  yes  Persons Notified of Discharge Plans: patient, wife Castle  Patient/Family in Agreement with the Plan:  yes    Handoff Referral Completed: Yes    Additional Information:  Plan is for patient to discharge home today. He will be cared for at home by his wife with the support of [facility]. He will have home O2. [facility] to transport. This writer will fax discharge orders to [facility].    TAYLOR JosephSW

## 2023-05-13 NOTE — PLAN OF CARE
"Speech Language Therapy Discharge Summary    Reason for therapy discharge:    Discharged to home with hospice. Patient may benefit from short-course SLP for swallow strategy training.    Progress towards therapy goal(s). See goals on Care Plan in [facility] electronic health record for goal details.  Goals partially met.  Barriers to achieving goals:   discharge from facility.    Therapy recommendation(s):    Continued therapy is recommended.  Rationale/Recommendations:  Patient may benefit from short-course SLP for swallow strategy training.    VFSS completed 5/11/2023:  \"Videofluoroscopic Swallow Study completed. Patient had no aspiration.  Intermittent shallow transient penetration with thin, specifically during large consecutive sips. No penetration with small sips controlled by SLP. Oral phase is WFL. Tongue base retraction was reduced. Swallow response was delayed. Epiglottic inversion was posterior-inferior to complete.  Significant stasis occurred with thin and mildly thick liquids at the valleculae and pyriform sinuses. This cleared with natural subsequent swallows. Hyolaryngeal elevation was WFL and hyolaryngeal excursion was reduced.  Mastication slow but functional. Recommend continue regular textures and thin liquids. 1:1 supervision to cue use of chin tuck/head turn to right when taking sips of liquids.    Attempted to train strategies including small single sip with spoon modeling and cues. Pt was unable to take small or single sips with liquids. He was also given cues for head neutral positioning during VFSS to assess risk without strategies, however, he was unable to follow cues and entire study was with head turned right and chin tucked.\"                            "

## 2023-05-13 NOTE — PROGRESS NOTES
stated she spoke with patient wife regarding her concerns, and calmed her fears. Transport stated he would call for assistance for stairs into home.

## 2023-05-13 NOTE — PLAN OF CARE
Problem: Plan of Care - These are the overarching goals to be used throughout the patient stay.    Goal: Optimal Comfort and Wellbeing  Outcome: Progressing     Problem: Plan of Care - These are the overarching goals to be used throughout the patient stay.    Goal: Absence of Hospital-Acquired Illness or Injury  Intervention: Identify and Manage Fall Risk  Recent Flowsheet Documentation  Taken 5/13/2023 0200 by Joce Lynn, RN  Safety Promotion/Fall Prevention:    activity supervised    clutter free environment maintained    assistive device/personal items within reach    safety round/check completed  Taken 5/12/2023 1950 by Joce Lynn, RN  Safety Promotion/Fall Prevention:    activity supervised    clutter free environment maintained    assistive device/personal items within reach    safety round/check completed     Pt is alert to self. Disoriented to place, time, situation. Bladder scan 336. Pt refused to be straight cath, denies discomfort. Re-approach pt later. Bladder scan 448, straight cath 550 ml. VSS. Slept between cares. Will continue to monitor.

## 2023-05-13 NOTE — PROGRESS NOTES
RESPIRATORY CARE NOTE   Patient is on 3L NC, BS diminished with inspiratory and expiratory wheezing, gave xoponex/atrovent treatment x1, BS post treatment no change, patient tolerated well. RT to continue to follow.      Melissa Flannery, RT

## 2023-05-13 NOTE — DISCHARGE SUMMARY
[facility]  Hospitalist Discharge Summary      Date of Admission:  5/9/2023  Date of Discharge:  5/13/2023 10:02 AM  Discharging Provider: Adrian Bush,   Discharge Service: Hospitalist Service         Follow-ups Needed After Discharge   Follow-up Appointments     Follow-up and recommended labs and tests       Follow up with primary care provider, Paramjit Christian, if needed               Unresulted Labs Ordered in the Past 30 Days of this Admission     No orders found from 4/9/2023 to 5/10/2023.           Discharge Disposition   Discharged to home  Condition at discharge: Stable       Hospital Course   89 year old male with history of CHF, COPD, dementia, and cardiomyopathy presents with COPD exacerbation.        Recurrent COPD exacerbation with acute respiratory failure with hypoxia:  Recent hospitalization for similar condition few weeks ago  Not on home O2  Chest x-ray shows benign calcified granuloma in the right upper lobe with hyperinflation and no pneumonia.  -Continue oxygen support at home after discharge  -Continue prednisone for 2 more days to complete standard burst without taper  -Completed course of oral Zithromax    - continue home inhalers  - hospice consultation requested by family prior to discharge  - family has elected to transition to hospice at home after discharge  - medically stable for discharge home with home hospice        Concern for aspiraiton  - speech therapy recommends modified diet as ordered after discharge        Urinary retention:  - vaz placed prior to discharge due to need for frequent straight caths while inpatient        Elevated troponin  - Mild, trended down on rechecks  - discontinued telemetry        Chronic systolic heart failure  Echo shows LVEF 40 to 45% is unchanged compared to 2019  - continue metoprolol, lisinopril        Mood disorder  -Home Zoloft        Dementia  - Stable  - Patient lives with his spouse who is a  caregiver        Weakness and deconditioning  - PT/OT           Goals of care:  -Home with hospice   -DNR/DNI             Consultations This Hospital Stay   PHYSICAL THERAPY ADULT IP CONSULT  OCCUPATIONAL THERAPY ADULT IP CONSULT  IP RESPIRATORY CARE CHRONIC PULMONARY DISEASE SPECIALIST  SPEECH LANGUAGE PATH ADULT IP CONSULT  CARE MANAGEMENT / SOCIAL WORK IP CONSULT  INPATIENT HOSPICE ADULT CONSULT    Code Status   DNR/DNI    Time Spent on this Encounter   IAdrian DO, personally saw the patient today and spent greater than 30 minutes discharging this patient.       Adrian Bush DO  [address] [address]  Phone: [phone]  Fax: [phone]  ______________________________________________________________________    Physical Exam   Vital Signs: Temp: 97.8  F (36.6  C) Temp src: Oral BP: (!) 141/81 Pulse: 70   Resp: 20 SpO2: 92 % O2 Device: Nasal cannula Oxygen Delivery: 2 LPM  Weight: 130 lbs 0 oz    General: NAD  RESPIRATORY: Respirations nonlabored  CARDIOVASCULAR: No le edema bilat.  NEUROLOGIC: No focal arm or leg weakness, speech is clear    Primary Care Physician   Paramjit Christian    Discharge Orders      Reason for your hospital stay    COPD     Follow-up and recommended labs and tests     Follow up with primary care provider, Paramjit Christian, if needed     Activity    Your activity upon discharge: activity as tolerated     Tubes and drains    You are going home with the following tubes or drains: vaz catheter.  Tube cares per hospital or hospice care instructions     Diet    Follow this diet upon discharge: Orders Placed This Encounter      Combination Diet Regular Diet; Thin Liquids (level 0)          Discharge Medications   Discharge Medication List as of 5/13/2023  9:42 AM      START taking these medications    Details   predniSONE (DELTASONE) 20 MG tablet Take 2 tablets (40 mg) by mouth daily for 2 days, Disp-4  tablet, R-0, E-Prescribe         CONTINUE these medications which have NOT CHANGED    Details   acetaminophen (TYLENOL) 325 MG tablet Take 2 tablets (650 mg) by mouth every 4 hours as needed for other (mild pain (1-3)), R-0, No Print Out      docusate sodium (COLACE) 100 MG capsule Take 1 capsule (100 mg) by mouth 2 times daily, Disp-60 capsule, R-1, E-Prescribe      fluticasone-salmeterol (ADVAIR) 250-50 MCG/DOSE inhaler Inhale 1 puff into the lungs 2 times daily, Historical      guaiFENesin (MUCINEX) 600 MG 12 hr tablet Take 1 tablet (600 mg) by mouth 2 times daily, Disp-60 tablet, R-1, E-Prescribe      ipratropium - albuterol 0.5 mg/2.5 mg/3 mL (DUONEB) 0.5-2.5 (3) MG/3ML neb solution Take 1 vial by nebulization every 6 hours as needed for shortness of breath, wheezing or cough, Historical      lisinopril (PRINIVIL,ZESTRIL) 5 MG tablet [LISINOPRIL (PRINIVIL,ZESTRIL) 5 MG TABLET] Take 1 tablet (5 mg total) by mouth at bedtime., Disp-90 tablet, R-3, Normal      metoprolol succinate ER (TOPROL XL) 25 MG 24 hr tablet Take 1 tablet (25 mg) by mouth daily, Disp-30 tablet, R-3, Local Print      sertraline (ZOLOFT) 25 MG tablet Take 25 mg by mouth daily, Historical           Allergies   Allergies   Allergen Reactions     Spironolactone Rash     patient broke out in very bad rash on both arms.

## 2023-05-13 NOTE — PLAN OF CARE
Problem: Plan of Care - These are the overarching goals to be used throughout the patient stay.    Goal: Readiness for Transition of Care  Outcome: Met   Goal Outcome Evaluation:       Transportation via w/c home on Hospice by 1030 today. Patient denies pain. He feeds self bites of BF. Dejesus catheter placed d/t retention. Took all am medications. Occasional congested productive cough. Lungs diminished and course. O2 @ 2 liters for sats >90. Spoke to spouse on phone. Updated  that spouse was overwhelmed and crying.

## 2023-05-13 NOTE — PLAN OF CARE
Occupational Therapy Discharge Summary    Reason for therapy discharge:    Discharged to home.    Progress towards therapy goal(s). See goals on Care Plan in [facility] electronic health record for goal details.  Goals partially met.  Barriers to achieving goals:   limited tolerance for therapy and d/c from facility, sign on to hospice.    Therapy recommendation(s):    No further therapy is recommended.

## 2023-05-15 NOTE — PROGRESS NOTES
[facility]    Background: Transitional Care Management program identified per system criteria and reviewed by [facility] Resource [place] team for possible outreach.    Assessment: Upon chart review, [facility] Team member will not proceed with patient outreach related to this episode of Transitional Care Management program due to reason below:    Patient has been discharged with Hospice Care.    Plan: Transitional Care Management episode addressed appropriately per reason noted above.      VERONICA Corona  [facility] [place]    *[facility] Resource Team does NOT follow patient ongoing. Referrals are identified based on internal discharge reports and the outreach is to ensure patient has an understanding of their discharge instructions.

## 2025-01-07 NOTE — PROGRESS NOTES
Care Management Follow Up    Length of Stay (days): 6    Expected Discharge Date: 12/21/2022     Concerns to be Addressed:       Patient plan of care discussed at interdisciplinary rounds: Yes    Anticipated Discharge Disposition:       Anticipated Discharge Services:    Anticipated Discharge DME:      Patient/family educated on Medicare website which has current facility and service quality ratings:    Education Provided on the Discharge Plan:    Patient/Family in Agreement with the Plan:      Referrals Placed by CM/SW:    Private pay costs discussed: Not applicable    Additional Information:  RNCM placed call to patient's wife to follow up on TCU referral status, RNCM sent out more referrals as TCU was declined for clinical review.     Discussed with wife via phone, stated patient could come home if he is moving well. RNCM discussed homecare and process. Will place referrals for RN/HHA/PT/OT for patient.     Wife expressed that she has been having difficulty managing her own DM and having concerns with her insulin pens. She stated that the paramedics were called but that she refused to go to the hospital at that time. She also stated that their refrigerator stopped working and that it will be getting fixed Thursday. She stated she has an appointment with her nephrologist tomorrow. RNCM noted patient was breathing heavy, discussed symptoms of patient, she stated that she was going to go lay down as she wasn't feeling well. RNCM asked if she needed me to call 911. She declined. MD was present for phone call as well. Stated she was going to call patient's daughter. RNCM stated she would call back this afternoon to see how she was doing.     Discussed with MD, was able to discuss with daughter, she was going to follow up with patient's wife.     10:59 AM - RNCM received notice accepted to TCU - awaiting insurance authorization. MD updated.     2:10 PM - Wife now refusing TCU, would like to just bring patient home.  "Plan to discharge tomorrow, family will transport at 3:30pm. RNCM asked what would be the plan for the patient if at Pats appointment tomorrow she needs to go to the hospital, she laughed and stated \"well lets pray that doesn't happen but then my daughter will have to take care of him\". RNCM updated MD.     2:20 PM - Homecare obtained, [facility] RN/HHA/PT/OT.     Salena Gordon RN        " Yes

## (undated) DEVICE — SHEATH PRELUDE SNAP 13CM 6FR

## (undated) DEVICE — SUCTION MANIFOLD NEPTUNE 2 SYS 1 PORT 702-025-000

## (undated) DEVICE — ELECTRODE ADULT PACING MULTI P-211-M1

## (undated) DEVICE — CUSTOM PACK PACER ICD SAN5BPCHEA

## (undated) DEVICE — INTRO MICRO MINI STICK 5FR STIFF NITINOL

## (undated) DEVICE — TUBING SUCTION MEDI-VAC 1/4"X20' N620A - HE

## (undated) DEVICE — SOL WATER IRRIG 1000ML BOTTLE 2F7114

## (undated) RX ORDER — FENTANYL CITRATE 50 UG/ML
INJECTION, SOLUTION INTRAMUSCULAR; INTRAVENOUS
Status: DISPENSED
Start: 2022-01-01

## (undated) RX ORDER — PROPOFOL 10 MG/ML
INJECTION, EMULSION INTRAVENOUS
Status: DISPENSED
Start: 2022-02-21

## (undated) RX ORDER — KETAMINE HYDROCHLORIDE 10 MG/ML
INJECTION INTRAMUSCULAR; INTRAVENOUS
Status: DISPENSED
Start: 2022-02-21

## (undated) RX ORDER — CEFAZOLIN SODIUM/WATER 2 G/20 ML
SYRINGE (ML) INTRAVENOUS
Status: DISPENSED
Start: 2022-01-01

## (undated) RX ORDER — FENTANYL CITRATE-0.9 % NACL/PF 10 MCG/ML
PLASTIC BAG, INJECTION (ML) INTRAVENOUS
Status: DISPENSED
Start: 2022-02-21

## (undated) RX ORDER — LIDOCAINE HYDROCHLORIDE 10 MG/ML
INJECTION, SOLUTION EPIDURAL; INFILTRATION; INTRACAUDAL; PERINEURAL
Status: DISPENSED
Start: 2022-01-01